# Patient Record
Sex: FEMALE | Race: WHITE | URBAN - METROPOLITAN AREA
[De-identification: names, ages, dates, MRNs, and addresses within clinical notes are randomized per-mention and may not be internally consistent; named-entity substitution may affect disease eponyms.]

---

## 2017-08-18 ENCOUNTER — HISTORICAL (OUTPATIENT)
Dept: LAB | Facility: HOSPITAL | Age: 62
End: 2017-08-18

## 2017-08-18 LAB
ALBUMIN SERPL-MCNC: 3.4 GM/DL (ref 3.4–5)
ALBUMIN/GLOB SERPL: 1 RATIO (ref 1.1–2)
ALP SERPL-CCNC: 107 UNIT/L (ref 46–116)
ALT SERPL-CCNC: 35 UNIT/L (ref 12–78)
AST SERPL-CCNC: 42 UNIT/L (ref 15–37)
BILIRUB SERPL-MCNC: 0.3 MG/DL (ref 0.2–1)
BILIRUBIN DIRECT+TOT PNL SERPL-MCNC: 0.14 MG/DL (ref 0–0.2)
BILIRUBIN DIRECT+TOT PNL SERPL-MCNC: 0.16 MG/DL (ref 0–0.8)
BUN SERPL-MCNC: 15.8 MG/DL (ref 7–18)
CALCIUM SERPL-MCNC: 8.8 MG/DL (ref 8.5–10.1)
CHLORIDE SERPL-SCNC: 102 MMOL/L (ref 98–107)
CHOLEST SERPL-MCNC: 169 MG/DL (ref 0–200)
CHOLEST/HDLC SERPL: 1.5 {RATIO} (ref 0–4)
CO2 SERPL-SCNC: 25.9 MMOL/L (ref 21–32)
CREAT SERPL-MCNC: 0.73 MG/DL (ref 0.6–1.3)
DEPRECATED CALCIDIOL+CALCIFEROL SERPL-MC: 66.8 NG/ML (ref 30–80)
ERYTHROCYTE [DISTWIDTH] IN BLOOD BY AUTOMATED COUNT: 13 % (ref 11.5–17)
FOLATE SERPL-MCNC: 5.3 NG/ML (ref 8.6–58.9)
GLOBULIN SER-MCNC: 3.4 GM/DL (ref 2.4–3.5)
GLUCOSE SERPL-MCNC: 70 MG/DL (ref 74–106)
HCT VFR BLD AUTO: 39.2 % (ref 37–47)
HDLC SERPL-MCNC: 116 MG/DL (ref 40–60)
HGB BLD-MCNC: 13 GM/DL (ref 12–16)
LDLC SERPL CALC-MCNC: 36 MG/DL (ref 0–129)
MAGNESIUM SERPL-MCNC: 1.7 MG/DL (ref 1.8–2.4)
MCH RBC QN AUTO: 32.8 PG (ref 27–31)
MCHC RBC AUTO-ENTMCNC: 33.1 GM/DL (ref 33–36)
MCV RBC AUTO: 98.8 FL (ref 80–94)
PLATELET # BLD AUTO: 265 X10(3)/MCL (ref 130–400)
PMV BLD AUTO: 7 FL (ref 7.4–10.4)
POTASSIUM SERPL-SCNC: 4 MMOL/L (ref 3.5–5.1)
PREALB SERPL-MCNC: 22.9 MG/DL (ref 18–35.7)
PROT SERPL-MCNC: 6.8 GM/DL (ref 6.4–8.2)
RBC # BLD AUTO: 3.96 X10(6)/MCL (ref 4.2–5.4)
SODIUM SERPL-SCNC: 140 MMOL/L (ref 136–145)
TRIGL SERPL-MCNC: 83 MG/DL
VIT B12 SERPL-MCNC: 548 PG/ML (ref 193–986)
VLDLC SERPL CALC-MCNC: 17 MG/DL
WBC # SPEC AUTO: 6.3 X10(3)/MCL (ref 4.5–11.5)

## 2017-10-10 ENCOUNTER — HISTORICAL (OUTPATIENT)
Dept: ADMINISTRATIVE | Facility: HOSPITAL | Age: 62
End: 2017-10-10

## 2017-10-10 LAB
ABS NEUT (OLG): 3.32 X10(3)/MCL (ref 2.1–9.2)
ALBUMIN SERPL-MCNC: 3.4 GM/DL (ref 3.4–5)
ALBUMIN/GLOB SERPL: 1 RATIO (ref 1.1–2)
ALP SERPL-CCNC: 138 UNIT/L (ref 38–126)
ALT SERPL-CCNC: 42 UNIT/L (ref 12–78)
APTT PPP: 31.4 SECOND(S) (ref 24.8–36.9)
AST SERPL-CCNC: 37 UNIT/L (ref 15–37)
BASOPHILS # BLD AUTO: 0 X10(3)/MCL (ref 0–0.2)
BASOPHILS NFR BLD AUTO: 1 %
BILIRUB SERPL-MCNC: 0.3 MG/DL (ref 0.2–1)
BILIRUBIN DIRECT+TOT PNL SERPL-MCNC: 0.1 MG/DL (ref 0–0.5)
BILIRUBIN DIRECT+TOT PNL SERPL-MCNC: 0.2 MG/DL (ref 0–0.8)
BUN SERPL-MCNC: 15 MG/DL (ref 7–18)
CALCIUM SERPL-MCNC: 9.1 MG/DL (ref 8.5–10.1)
CHLORIDE SERPL-SCNC: 106 MMOL/L (ref 98–107)
CO2 SERPL-SCNC: 30 MMOL/L (ref 21–32)
CREAT SERPL-MCNC: 0.63 MG/DL (ref 0.55–1.02)
EOSINOPHIL # BLD AUTO: 0.2 X10(3)/MCL (ref 0–0.9)
EOSINOPHIL NFR BLD AUTO: 3 %
ERYTHROCYTE [DISTWIDTH] IN BLOOD BY AUTOMATED COUNT: 12.2 % (ref 11.5–17)
GLOBULIN SER-MCNC: 3.4 GM/DL (ref 2.4–3.5)
GLUCOSE SERPL-MCNC: 91 MG/DL (ref 74–106)
HCT VFR BLD AUTO: 39.9 % (ref 37–47)
HGB BLD-MCNC: 13 GM/DL (ref 12–16)
INR PPP: 1.03 (ref 0–1.27)
LYMPHOCYTES # BLD AUTO: 1.6 X10(3)/MCL (ref 0.6–4.6)
LYMPHOCYTES NFR BLD AUTO: 29 %
MCH RBC QN AUTO: 32.3 PG (ref 27–31)
MCHC RBC AUTO-ENTMCNC: 32.6 GM/DL (ref 33–36)
MCV RBC AUTO: 99.3 FL (ref 80–94)
MONOCYTES # BLD AUTO: 0.6 X10(3)/MCL (ref 0.1–1.3)
MONOCYTES NFR BLD AUTO: 10 %
NEUTROPHILS # BLD AUTO: 3.32 X10(3)/MCL (ref 2.1–9.2)
NEUTROPHILS NFR BLD AUTO: 58 %
PLATELET # BLD AUTO: 288 X10(3)/MCL (ref 130–400)
PMV BLD AUTO: 8.7 FL (ref 9.4–12.4)
POTASSIUM SERPL-SCNC: 4.5 MMOL/L (ref 3.5–5.1)
PROT SERPL-MCNC: 6.8 GM/DL (ref 6.4–8.2)
PROTHROMBIN TIME: 13.3 SECOND(S) (ref 12.2–14.7)
RBC # BLD AUTO: 4.02 X10(6)/MCL (ref 4.2–5.4)
SODIUM SERPL-SCNC: 145 MMOL/L (ref 136–145)
WBC # SPEC AUTO: 5.7 X10(3)/MCL (ref 4.5–11.5)

## 2017-10-19 ENCOUNTER — HISTORICAL (OUTPATIENT)
Dept: ADMINISTRATIVE | Facility: HOSPITAL | Age: 62
End: 2017-10-19

## 2017-10-26 ENCOUNTER — HISTORICAL (OUTPATIENT)
Dept: RADIOLOGY | Facility: HOSPITAL | Age: 62
End: 2017-10-26

## 2017-11-20 LAB — FINAL CULTURE: NORMAL

## 2019-11-01 ENCOUNTER — HISTORICAL (OUTPATIENT)
Dept: LAB | Facility: HOSPITAL | Age: 64
End: 2019-11-01

## 2019-11-01 LAB
ALBUMIN SERPL-MCNC: 3.7 GM/DL (ref 3.4–5)
ALBUMIN/GLOB SERPL: 1.1 RATIO (ref 1.1–2)
ALP SERPL-CCNC: 109 UNIT/L (ref 46–116)
ALT SERPL-CCNC: 43 UNIT/L (ref 12–78)
AST SERPL-CCNC: 49 UNIT/L (ref 15–37)
BILIRUB SERPL-MCNC: 0.4 MG/DL (ref 0.2–1)
BILIRUBIN DIRECT+TOT PNL SERPL-MCNC: 0.15 MG/DL (ref 0–0.2)
BILIRUBIN DIRECT+TOT PNL SERPL-MCNC: 0.25 MG/DL (ref 0–0.8)
BUN SERPL-MCNC: 20.9 MG/DL (ref 7–18)
CALCIUM SERPL-MCNC: 9.3 MG/DL (ref 8.5–10.1)
CHLORIDE SERPL-SCNC: 106 MMOL/L (ref 98–107)
CHOLEST SERPL-MCNC: 212 MG/DL (ref 0–200)
CHOLEST/HDLC SERPL: 2.6 {RATIO} (ref 0–4)
CO2 SERPL-SCNC: 26.8 MMOL/L (ref 21–32)
CREAT SERPL-MCNC: 1.11 MG/DL (ref 0.6–1.3)
GLOBULIN SER-MCNC: 3.4 GM/DL (ref 2.4–3.5)
GLUCOSE SERPL-MCNC: 95 MG/DL (ref 74–106)
HDLC SERPL-MCNC: 80 MG/DL (ref 40–60)
LDLC SERPL CALC-MCNC: 86 MG/DL (ref 0–129)
POTASSIUM SERPL-SCNC: 4.8 MMOL/L (ref 3.5–5.1)
PROT SERPL-MCNC: 7.1 GM/DL (ref 6.4–8.2)
SODIUM SERPL-SCNC: 143 MMOL/L (ref 136–145)
TRIGL SERPL-MCNC: 231 MG/DL
VLDLC SERPL CALC-MCNC: 46 MG/DL

## 2019-12-23 ENCOUNTER — HISTORICAL (OUTPATIENT)
Dept: LAB | Facility: HOSPITAL | Age: 64
End: 2019-12-23

## 2019-12-23 LAB
ABS NEUT (OLG): 4.72 X10(3)/MCL (ref 2.1–9.2)
ALBUMIN SERPL-MCNC: 3.6 GM/DL (ref 3.4–5)
ALBUMIN/GLOB SERPL: 1 RATIO (ref 1.1–2)
ALP SERPL-CCNC: 122 UNIT/L (ref 46–116)
ALT SERPL-CCNC: 38 UNIT/L (ref 12–78)
AST SERPL-CCNC: 32 UNIT/L (ref 15–37)
BASOPHILS # BLD AUTO: 0 X10(3)/MCL (ref 0–0.2)
BASOPHILS NFR BLD AUTO: 1 %
BILIRUB SERPL-MCNC: 0.3 MG/DL (ref 0.2–1)
BILIRUBIN DIRECT+TOT PNL SERPL-MCNC: 0.12 MG/DL (ref 0–0.2)
BILIRUBIN DIRECT+TOT PNL SERPL-MCNC: 0.18 MG/DL (ref 0–0.8)
BUN SERPL-MCNC: 22.5 MG/DL (ref 7–18)
CALCIUM SERPL-MCNC: 9.1 MG/DL (ref 8.5–10.1)
CHLORIDE SERPL-SCNC: 106 MMOL/L (ref 98–107)
CO2 SERPL-SCNC: 24.3 MMOL/L (ref 21–32)
CREAT SERPL-MCNC: 0.9 MG/DL (ref 0.6–1.3)
EOSINOPHIL # BLD AUTO: 0.2 X10(3)/MCL (ref 0–0.9)
EOSINOPHIL NFR BLD AUTO: 3 %
ERYTHROCYTE [DISTWIDTH] IN BLOOD BY AUTOMATED COUNT: 11.7 % (ref 11.5–17)
GLOBULIN SER-MCNC: 3.7 GM/DL (ref 2.4–3.5)
GLUCOSE SERPL-MCNC: 95 MG/DL (ref 74–106)
HCT VFR BLD AUTO: 36.3 % (ref 37–47)
HGB BLD-MCNC: 11.6 GM/DL (ref 12–16)
IMM GRANULOCYTES # BLD AUTO: 0.02 % (ref 0–0.02)
IMM GRANULOCYTES NFR BLD AUTO: 0.3 % (ref 0–0.43)
LYMPHOCYTES # BLD AUTO: 1.1 X10(3)/MCL (ref 0.6–4.6)
LYMPHOCYTES NFR BLD AUTO: 16 %
MAGNESIUM SERPL-MCNC: 1.7 MG/DL (ref 1.8–2.4)
MCH RBC QN AUTO: 31.4 PG (ref 27–31)
MCHC RBC AUTO-ENTMCNC: 32 GM/DL (ref 33–36)
MCV RBC AUTO: 98.1 FL (ref 80–94)
MONOCYTES # BLD AUTO: 0.6 X10(3)/MCL (ref 0.1–1.3)
MONOCYTES NFR BLD AUTO: 10 %
NEUTROPHILS # BLD AUTO: 4.72 X10(3)/MCL (ref 1.4–7.9)
NEUTROPHILS NFR BLD AUTO: 71 %
PHOSPHATE SERPL-MCNC: 4.6 MG/DL (ref 2.5–4.9)
PLATELET # BLD AUTO: 337 X10(3)/MCL (ref 130–400)
PMV BLD AUTO: 9.7 FL (ref 9.4–12.4)
POTASSIUM SERPL-SCNC: 4 MMOL/L (ref 3.5–5.1)
PROT SERPL-MCNC: 7.3 GM/DL (ref 6.4–8.2)
RBC # BLD AUTO: 3.7 X10(6)/MCL (ref 4.2–5.4)
SODIUM SERPL-SCNC: 140 MMOL/L (ref 136–145)
WBC # SPEC AUTO: 6.7 X10(3)/MCL (ref 4.5–11.5)

## 2020-03-24 ENCOUNTER — HISTORICAL (OUTPATIENT)
Dept: ADMINISTRATIVE | Facility: HOSPITAL | Age: 65
End: 2020-03-24

## 2020-03-24 LAB
ALBUMIN SERPL-MCNC: 4.1 G/DL (ref 3.8–4.8)
ALBUMIN/GLOB SERPL: 1.6 {RATIO} (ref 1.2–2.2)
ALP SERPL-CCNC: 102 IU/L (ref 39–117)
ALT SERPL-CCNC: 70 IU/L (ref 0–32)
AST SERPL-CCNC: 106 IU/L (ref 0–40)
BASOPHILS # BLD AUTO: 0.1 X10E3/UL (ref 0–0.2)
BASOPHILS NFR BLD AUTO: 1 %
BILIRUB SERPL-MCNC: 0.3 MG/DL (ref 0–1.2)
BUN SERPL-MCNC: 17 MG/DL (ref 8–27)
CALCIUM SERPL-MCNC: 10 MG/DL (ref 8.7–10.3)
CHLORIDE SERPL-SCNC: 102 MMOL/L (ref 96–106)
CHOLEST SERPL-MCNC: 174 MG/DL (ref 100–199)
CHOLEST/HDLC SERPL: 3.3 RATIO (ref 0–4.4)
CO2 SERPL-SCNC: 23 MMOL/L (ref 20–29)
CREAT SERPL-MCNC: 0.91 MG/DL (ref 0.57–1)
CREAT/UREA NIT SERPL: 19 (ref 12–28)
DEPRECATED CALCIDIOL+CALCIFEROL SERPL-MC: 60 NG/ML (ref 30–100)
EOSINOPHIL # BLD AUTO: 0.1 X10E3/UL (ref 0–0.4)
EOSINOPHIL NFR BLD AUTO: 2 %
ERYTHROCYTE [DISTWIDTH] IN BLOOD BY AUTOMATED COUNT: 13.3 % (ref 11.7–15.4)
GLOBULIN SER-MCNC: 2.6 G/DL (ref 1.5–4.5)
GLUCOSE SERPL-MCNC: 121 MG/DL (ref 65–99)
HCT VFR BLD AUTO: 38.7 % (ref 34–46.6)
HDLC SERPL-MCNC: 52 MG/DL
HGB BLD-MCNC: 11.8 G/DL (ref 11.1–15.9)
LDLC SERPL CALC-MCNC: 82 MG/DL (ref 0–99)
LYMPHOCYTES # BLD AUTO: 1 X10E3/UL (ref 0.7–3.1)
LYMPHOCYTES NFR BLD AUTO: 17 %
MCH RBC QN AUTO: 30.3 PG (ref 26.6–33)
MCHC RBC AUTO-ENTMCNC: 30.5 G/DL (ref 31.5–35.7)
MCV RBC AUTO: 100 FL (ref 79–97)
MICROALBUMIN/CREAT RATIO PNL UR: 38 MG/G CREAT (ref 0–29)
MONOCYTES # BLD AUTO: 0.5 X10E3/UL (ref 0.1–0.9)
MONOCYTES NFR BLD AUTO: 8 %
NEUTROPHILS # BLD AUTO: 4.2 X10E3/UL (ref 1.4–7)
NEUTROPHILS NFR BLD AUTO: 72 %
PLATELET # BLD AUTO: 417 X10E3/UL (ref 150–450)
POTASSIUM SERPL-SCNC: 4.8 MMOL/L (ref 3.5–5.2)
PROT SERPL-MCNC: 6.7 G/DL (ref 6–8.5)
RBC # BLD AUTO: 3.89 X10(6)/MCL (ref 3.77–5.28)
SODIUM SERPL-SCNC: 142 MMOL/L (ref 134–144)
TRIGL SERPL-MCNC: 200 MG/DL (ref 0–149)
TSH SERPL-ACNC: 1.24 MIU/ML (ref 0.45–4.5)
VLDLC SERPL CALC-MCNC: 40 MG/DL (ref 5–40)
WBC # SPEC AUTO: 5.8 X10E3/UL (ref 3.4–10.8)

## 2020-04-06 ENCOUNTER — HISTORICAL (OUTPATIENT)
Dept: ADMINISTRATIVE | Facility: HOSPITAL | Age: 65
End: 2020-04-06

## 2020-04-06 LAB
ABS NEUT (OLG): 3.86 X10(3)/MCL (ref 2.1–9.2)
ALBUMIN SERPL-MCNC: 3.6 GM/DL (ref 3.4–4.8)
ALBUMIN/GLOB SERPL: 1.3 RATIO (ref 1.1–2)
ALP SERPL-CCNC: 94 UNIT/L (ref 40–150)
ALT SERPL-CCNC: 53 UNIT/L (ref 0–55)
AST SERPL-CCNC: 66 UNIT/L (ref 5–34)
BASOPHILS # BLD AUTO: 0 X10(3)/MCL (ref 0–0.2)
BASOPHILS NFR BLD AUTO: 0.8 %
BILIRUB SERPL-MCNC: 0.3 MG/DL
BILIRUBIN DIRECT+TOT PNL SERPL-MCNC: 0.1 MG/DL (ref 0–0.5)
BILIRUBIN DIRECT+TOT PNL SERPL-MCNC: 0.2 MG/DL (ref 0–0.8)
BUN SERPL-MCNC: 13 MG/DL (ref 9.8–20.1)
CALCIUM SERPL-MCNC: 8.8 MG/DL (ref 8.4–10.2)
CHLORIDE SERPL-SCNC: 106 MMOL/L (ref 98–107)
CO2 SERPL-SCNC: 27 MMOL/L (ref 23–31)
CREAT SERPL-MCNC: 0.74 MG/DL (ref 0.55–1.02)
EOSINOPHIL # BLD AUTO: 0.4 X10(3)/MCL (ref 0–0.9)
EOSINOPHIL NFR BLD AUTO: 6.8 %
ERYTHROCYTE [DISTWIDTH] IN BLOOD BY AUTOMATED COUNT: 13.3 % (ref 11.5–17)
GLOBULIN SER-MCNC: 2.7 GM/DL (ref 2.4–3.5)
GLUCOSE SERPL-MCNC: 38 MG/DL (ref 82–115)
HCT VFR BLD AUTO: 34 % (ref 37–47)
HGB BLD-MCNC: 10.3 GM/DL (ref 12–16)
LYMPHOCYTES # BLD AUTO: 1.2 X10(3)/MCL (ref 0.6–4.6)
LYMPHOCYTES NFR BLD AUTO: 18.9 %
MCH RBC QN AUTO: 30.1 PG (ref 27–31)
MCHC RBC AUTO-ENTMCNC: 30.3 GM/DL (ref 33–36)
MCV RBC AUTO: 99.4 FL (ref 80–94)
MONOCYTES # BLD AUTO: 0.6 X10(3)/MCL (ref 0.1–1.3)
MONOCYTES NFR BLD AUTO: 10.4 %
NEUTROPHILS # BLD AUTO: 3.9 X10(3)/MCL (ref 2.1–9.2)
NEUTROPHILS NFR BLD AUTO: 62.9 %
PLATELET # BLD AUTO: 281 X10(3)/MCL (ref 130–400)
PMV BLD AUTO: 9.1 FL (ref 9.4–12.4)
POTASSIUM SERPL-SCNC: 4.4 MMOL/L (ref 3.5–5.1)
PROT SERPL-MCNC: 6.3 GM/DL (ref 5.8–7.6)
RBC # BLD AUTO: 3.42 X10(6)/MCL (ref 4.2–5.4)
SODIUM SERPL-SCNC: 142 MMOL/L (ref 136–145)
TSH SERPL-ACNC: 1.59 UIU/ML (ref 0.35–4.94)
WBC # SPEC AUTO: 6.1 X10(3)/MCL (ref 4.5–11.5)

## 2020-04-22 ENCOUNTER — HISTORICAL (OUTPATIENT)
Dept: INFUSION THERAPY | Facility: HOSPITAL | Age: 65
End: 2020-04-22

## 2020-04-22 LAB
ABS NEUT (OLG): 3.72 X10(3)/MCL (ref 2.1–9.2)
ANION GAP SERPL CALC-SCNC: 14 MMOL/L
BASOPHILS # BLD AUTO: 0 X10(3)/MCL (ref 0–0.2)
BASOPHILS NFR BLD AUTO: 0.7 %
BUN SERPL-MCNC: 10 MG/DL (ref 8–26)
CHLORIDE SERPL-SCNC: 102 MMOL/L (ref 98–109)
CREAT SERPL-MCNC: 0.7 MG/DL (ref 0.6–1.3)
EOSINOPHIL # BLD AUTO: 0.3 X10(3)/MCL (ref 0–0.9)
EOSINOPHIL NFR BLD AUTO: 4.9 %
ERYTHROCYTE [DISTWIDTH] IN BLOOD BY AUTOMATED COUNT: 12.3 % (ref 11.5–17)
FERRITIN SERPL-MCNC: 173.65 NG/ML (ref 4.63–204)
FOLATE SERPL-MCNC: 17.6 NG/ML (ref 7–31.4)
GLUCOSE SERPL-MCNC: 277 MG/DL (ref 70–105)
HCT VFR BLD AUTO: 36.6 % (ref 37–47)
HCT VFR BLD CALC: 37 % (ref 38–51)
HGB BLD-MCNC: 11.3 GM/DL (ref 12–16)
HGB BLD-MCNC: 12.6 MG/DL (ref 12–17)
IRON SATN MFR SERPL: 27 %
IRON SERPL-MCNC: 69 UG/DL (ref 50–170)
LYMPHOCYTES # BLD AUTO: 0.9 X10(3)/MCL (ref 0.6–4.6)
LYMPHOCYTES NFR BLD AUTO: 16.8 %
MCH RBC QN AUTO: 30.1 PG (ref 27–31)
MCHC RBC AUTO-ENTMCNC: 30.9 GM/DL (ref 33–36)
MCV RBC AUTO: 97.3 FL (ref 80–94)
MONOCYTES # BLD AUTO: 0.4 X10(3)/MCL (ref 0.1–1.3)
MONOCYTES NFR BLD AUTO: 8 %
NEUTROPHILS # BLD AUTO: 3.7 X10(3)/MCL (ref 2.1–9.2)
NEUTROPHILS NFR BLD AUTO: 69.4 %
PLATELET # BLD AUTO: 309 X10(3)/MCL (ref 130–400)
PMV BLD AUTO: 9.6 FL (ref 9.4–12.4)
POC IONIZED CALCIUM: 1.11 MMOL/L (ref 1.12–1.32)
POC TCO2: 26 MMOL/L (ref 24–29)
POTASSIUM BLD-SCNC: 4.3 MMOL/L (ref 3.5–4.9)
RBC # BLD AUTO: 3.76 X10(6)/MCL (ref 4.2–5.4)
RET# (OHS): 0.05 X10^6/ML (ref 0.02–0.08)
RETICULOCYTE COUNT AUTOMATED (OLG): 1.3 % (ref 1.1–2.1)
SODIUM BLD-SCNC: 137 MMOL/L (ref 138–146)
TIBC SERPL-MCNC: 183 UG/DL (ref 70–310)
TIBC SERPL-MCNC: 252 UG/DL
TRANSFERRIN SERPL-MCNC: 212 MG/DL (ref 173–360)
VIT B12 SERPL-MCNC: 602 PG/ML (ref 213–816)
WBC # SPEC AUTO: 5.4 X10(3)/MCL (ref 4.5–11.5)

## 2020-04-29 ENCOUNTER — HISTORICAL (OUTPATIENT)
Dept: HEMATOLOGY/ONCOLOGY | Facility: CLINIC | Age: 65
End: 2020-04-29

## 2020-04-29 LAB
ABS NEUT (OLG): 1.99 X10(3)/MCL (ref 2.1–9.2)
ALBUMIN SERPL-MCNC: 3.9 GM/DL (ref 3.4–4.8)
ALBUMIN/GLOB SERPL: 1.4 RATIO (ref 1.1–2)
ALP SERPL-CCNC: 109 UNIT/L (ref 40–150)
ALT SERPL-CCNC: 26 UNIT/L (ref 0–55)
AST SERPL-CCNC: 27 UNIT/L (ref 5–34)
BASOPHILS # BLD AUTO: 0 X10(3)/MCL (ref 0–0.2)
BASOPHILS NFR BLD AUTO: 0.9 %
BILIRUB SERPL-MCNC: 0.4 MG/DL
BILIRUBIN DIRECT+TOT PNL SERPL-MCNC: 0.2 MG/DL (ref 0–0.5)
BILIRUBIN DIRECT+TOT PNL SERPL-MCNC: 0.2 MG/DL (ref 0–0.8)
BUN SERPL-MCNC: 12 MG/DL (ref 9.8–20.1)
CALCIUM SERPL-MCNC: 9.2 MG/DL (ref 8.4–10.2)
CHLORIDE SERPL-SCNC: 102 MMOL/L (ref 98–107)
CO2 SERPL-SCNC: 27 MMOL/L (ref 23–31)
CREAT SERPL-MCNC: 0.8 MG/DL (ref 0.55–1.02)
EOSINOPHIL # BLD AUTO: 0.2 X10(3)/MCL (ref 0–0.9)
EOSINOPHIL NFR BLD AUTO: 7.1 %
ERYTHROCYTE [DISTWIDTH] IN BLOOD BY AUTOMATED COUNT: 12.1 % (ref 11.5–17)
GLOBULIN SER-MCNC: 2.7 GM/DL (ref 2.4–3.5)
GLUCOSE SERPL-MCNC: 232 MG/DL (ref 82–115)
HCT VFR BLD AUTO: 36.5 % (ref 37–47)
HGB BLD-MCNC: 11.3 GM/DL (ref 12–16)
LYMPHOCYTES # BLD AUTO: 0.9 X10(3)/MCL (ref 0.6–4.6)
LYMPHOCYTES NFR BLD AUTO: 27.6 %
MCH RBC QN AUTO: 29.3 PG (ref 27–31)
MCHC RBC AUTO-ENTMCNC: 31 GM/DL (ref 33–36)
MCV RBC AUTO: 94.6 FL (ref 80–94)
MONOCYTES # BLD AUTO: 0.2 X10(3)/MCL (ref 0.1–1.3)
MONOCYTES NFR BLD AUTO: 5.3 %
NEUTROPHILS # BLD AUTO: 2 X10(3)/MCL (ref 2.1–9.2)
NEUTROPHILS NFR BLD AUTO: 59.1 %
PLATELET # BLD AUTO: 342 X10(3)/MCL (ref 130–400)
PMV BLD AUTO: 9.6 FL (ref 9.4–12.4)
POTASSIUM SERPL-SCNC: 4.2 MMOL/L (ref 3.5–5.1)
PROT SERPL-MCNC: 6.6 GM/DL (ref 5.8–7.6)
RBC # BLD AUTO: 3.86 X10(6)/MCL (ref 4.2–5.4)
SODIUM SERPL-SCNC: 138 MMOL/L (ref 136–145)
WBC # SPEC AUTO: 3.4 X10(3)/MCL (ref 4.5–11.5)

## 2020-05-13 ENCOUNTER — HISTORICAL (OUTPATIENT)
Dept: INFUSION THERAPY | Facility: HOSPITAL | Age: 65
End: 2020-05-13

## 2020-05-13 LAB
ABS NEUT (OLG): 4.14 X10(3)/MCL (ref 2.1–9.2)
ANION GAP SERPL CALC-SCNC: 16 MMOL/L
BASOPHILS # BLD AUTO: 0.1 X10(3)/MCL (ref 0–0.2)
BASOPHILS NFR BLD AUTO: 1.2 %
BUN SERPL-MCNC: 10 MG/DL (ref 8–26)
CHLORIDE SERPL-SCNC: 102 MMOL/L (ref 98–109)
CREAT SERPL-MCNC: 0.8 MG/DL (ref 0.6–1.3)
EOSINOPHIL # BLD AUTO: 0.3 X10(3)/MCL (ref 0–0.9)
EOSINOPHIL NFR BLD AUTO: 4.8 %
ERYTHROCYTE [DISTWIDTH] IN BLOOD BY AUTOMATED COUNT: 12.7 % (ref 11.5–17)
GLUCOSE SERPL-MCNC: 348 MG/DL (ref 70–105)
HCT VFR BLD AUTO: 36.8 % (ref 37–47)
HCT VFR BLD CALC: 38 % (ref 38–51)
HGB BLD-MCNC: 11.4 GM/DL (ref 12–16)
HGB BLD-MCNC: 12.9 MG/DL (ref 12–17)
LYMPHOCYTES # BLD AUTO: 1.3 X10(3)/MCL (ref 0.6–4.6)
LYMPHOCYTES NFR BLD AUTO: 19.8 %
MCH RBC QN AUTO: 29.6 PG (ref 27–31)
MCHC RBC AUTO-ENTMCNC: 31 GM/DL (ref 33–36)
MCV RBC AUTO: 95.6 FL (ref 80–94)
MONOCYTES # BLD AUTO: 0.6 X10(3)/MCL (ref 0.1–1.3)
MONOCYTES NFR BLD AUTO: 9.2 %
NEUTROPHILS # BLD AUTO: 4.1 X10(3)/MCL (ref 2.1–9.2)
NEUTROPHILS NFR BLD AUTO: 64.5 %
PLATELET # BLD AUTO: 344 X10(3)/MCL (ref 130–400)
PMV BLD AUTO: 9 FL (ref 9.4–12.4)
POC IONIZED CALCIUM: 1.15 MMOL/L (ref 1.12–1.32)
POC TCO2: 25 MMOL/L (ref 24–29)
POTASSIUM BLD-SCNC: 4.5 MMOL/L (ref 3.5–4.9)
RBC # BLD AUTO: 3.85 X10(6)/MCL (ref 4.2–5.4)
SODIUM BLD-SCNC: 137 MMOL/L (ref 138–146)
WBC # SPEC AUTO: 6.4 X10(3)/MCL (ref 4.5–11.5)

## 2020-05-17 ENCOUNTER — HOSPITAL ENCOUNTER (OUTPATIENT)
Dept: MEDSURG UNIT | Facility: HOSPITAL | Age: 65
End: 2020-05-19
Attending: INTERNAL MEDICINE | Admitting: INTERNAL MEDICINE

## 2020-05-18 LAB
ABS NEUT (OLG): 4.18 X10(3)/MCL (ref 2.1–9.2)
ALBUMIN SERPL-MCNC: 3 GM/DL (ref 3.4–5)
ALBUMIN/GLOB SERPL: 1.1 RATIO (ref 1.1–2)
ALP SERPL-CCNC: 88 UNIT/L (ref 46–116)
ALT SERPL-CCNC: 29 UNIT/L (ref 12–78)
AST SERPL-CCNC: 20 UNIT/L (ref 15–37)
BASOPHILS # BLD AUTO: 0 X10(3)/MCL (ref 0–0.2)
BASOPHILS NFR BLD AUTO: 1 %
BILIRUB SERPL-MCNC: 0.6 MG/DL (ref 0.2–1)
BILIRUBIN DIRECT+TOT PNL SERPL-MCNC: 0.2 MG/DL (ref 0–0.2)
BILIRUBIN DIRECT+TOT PNL SERPL-MCNC: 0.4 MG/DL (ref 0–0.8)
BUN SERPL-MCNC: 13.7 MG/DL (ref 7–18)
CALCIUM SERPL-MCNC: 7.9 MG/DL (ref 8.5–10.1)
CHLORIDE SERPL-SCNC: 104 MMOL/L (ref 98–107)
CO2 SERPL-SCNC: 20.8 MMOL/L (ref 21–32)
CREAT SERPL-MCNC: 1.14 MG/DL (ref 0.6–1.3)
EOSINOPHIL # BLD AUTO: 1 X10(3)/MCL (ref 0–0.9)
EOSINOPHIL NFR BLD AUTO: 16 %
ERYTHROCYTE [DISTWIDTH] IN BLOOD BY AUTOMATED COUNT: 12.8 % (ref 11.5–17)
GLOBULIN SER-MCNC: 2.8 GM/DL (ref 2.4–3.5)
GLUCOSE SERPL-MCNC: 358 MG/DL (ref 74–106)
HCT VFR BLD AUTO: 31.9 % (ref 37–47)
HGB BLD-MCNC: 10.3 GM/DL (ref 12–16)
IMM GRANULOCYTES # BLD AUTO: 0.01 % (ref 0–0.02)
IMM GRANULOCYTES NFR BLD AUTO: 0.2 % (ref 0–0.43)
LYMPHOCYTES # BLD AUTO: 1 X10(3)/MCL (ref 0.6–4.6)
LYMPHOCYTES NFR BLD AUTO: 15 %
MCH RBC QN AUTO: 30.2 PG (ref 27–31)
MCHC RBC AUTO-ENTMCNC: 32.3 GM/DL (ref 33–36)
MCV RBC AUTO: 93.5 FL (ref 80–94)
MONOCYTES # BLD AUTO: 0.1 X10(3)/MCL (ref 0.1–1.3)
MONOCYTES NFR BLD AUTO: 2 %
NEUTROPHILS # BLD AUTO: 4.18 X10(3)/MCL (ref 1.4–7.9)
NEUTROPHILS NFR BLD AUTO: 66 %
PLATELET # BLD AUTO: 221 X10(3)/MCL (ref 130–400)
PMV BLD AUTO: 9.9 FL (ref 9.4–12.4)
POTASSIUM SERPL-SCNC: 3.7 MMOL/L (ref 3.5–5.1)
PROT SERPL-MCNC: 5.8 GM/DL (ref 6.4–8.2)
RBC # BLD AUTO: 3.41 X10(6)/MCL (ref 4.2–5.4)
SODIUM SERPL-SCNC: 140 MMOL/L (ref 136–145)
WBC # SPEC AUTO: 6.3 X10(3)/MCL (ref 4.5–11.5)

## 2020-05-20 ENCOUNTER — HISTORICAL (OUTPATIENT)
Dept: ADMINISTRATIVE | Facility: HOSPITAL | Age: 65
End: 2020-05-20

## 2020-05-20 LAB
ALBUMIN SERPL-MCNC: 3.9 G/DL (ref 3.8–4.8)
ALBUMIN/GLOB SERPL: 2.2 {RATIO} (ref 1.2–2.2)
ALP SERPL-CCNC: 99 IU/L (ref 39–117)
ALT SERPL-CCNC: 25 IU/L (ref 0–32)
AST SERPL-CCNC: 31 IU/L (ref 0–40)
BASOPHILS # BLD AUTO: 0.1 X10E3/UL (ref 0–0.2)
BASOPHILS NFR BLD AUTO: 1 %
BILIRUB SERPL-MCNC: 0.2 MG/DL (ref 0–1.2)
BUN SERPL-MCNC: 13 MG/DL (ref 8–27)
CALCIUM SERPL-MCNC: 9.3 MG/DL (ref 8.7–10.3)
CHLORIDE SERPL-SCNC: 96 MMOL/L (ref 96–106)
CO2 SERPL-SCNC: 26 MMOL/L (ref 20–29)
CREAT SERPL-MCNC: 0.77 MG/DL (ref 0.57–1)
CREAT/UREA NIT SERPL: 17 (ref 12–28)
EOSINOPHIL # BLD AUTO: 1 X10E3/UL (ref 0–0.4)
EOSINOPHIL NFR BLD AUTO: 21 %
ERYTHROCYTE [DISTWIDTH] IN BLOOD BY AUTOMATED COUNT: 12.7 % (ref 11.7–15.4)
GLOBULIN SER-MCNC: 1.8 G/DL (ref 1.5–4.5)
GLUCOSE SERPL-MCNC: 371 MG/DL (ref 65–99)
HCT VFR BLD AUTO: 33.4 % (ref 34–46.6)
HGB BLD-MCNC: 10.9 G/DL (ref 11.1–15.9)
LYMPHOCYTES # BLD AUTO: 0.9 X10E3/UL (ref 0.7–3.1)
LYMPHOCYTES NFR BLD AUTO: 18 %
MCH RBC QN AUTO: 30.4 PG (ref 26.6–33)
MCHC RBC AUTO-ENTMCNC: 32.6 G/DL (ref 31.5–35.7)
MCV RBC AUTO: 93 FL (ref 79–97)
MONOCYTES # BLD AUTO: 0.2 X10E3/UL (ref 0.1–0.9)
MONOCYTES NFR BLD AUTO: 3 %
NEUTROPHILS # BLD AUTO: 2.6 X10E3/UL (ref 1.4–7)
NEUTROPHILS NFR BLD AUTO: 57 %
PLATELET # BLD AUTO: 229 X10E3/UL (ref 150–450)
POTASSIUM SERPL-SCNC: 4.3 MMOL/L (ref 3.5–5.2)
PROT SERPL-MCNC: 5.7 G/DL (ref 6–8.5)
RBC # BLD AUTO: 3.59 X10(6)/MCL (ref 3.77–5.28)
SODIUM SERPL-SCNC: 137 MMOL/L (ref 134–144)
WBC # SPEC AUTO: 4.6 X10E3/UL (ref 3.4–10.8)

## 2020-06-03 ENCOUNTER — HISTORICAL (OUTPATIENT)
Dept: INFUSION THERAPY | Facility: HOSPITAL | Age: 65
End: 2020-06-03

## 2020-06-10 ENCOUNTER — HISTORICAL (OUTPATIENT)
Dept: HEMATOLOGY/ONCOLOGY | Facility: CLINIC | Age: 65
End: 2020-06-10

## 2020-06-10 LAB
ABS NEUT (OLG): 3.6 X10(3)/MCL (ref 2.1–9.2)
ANION GAP SERPL CALC-SCNC: 17 MMOL/L
BASOPHILS # BLD AUTO: 0 X10(3)/MCL (ref 0–0.2)
BASOPHILS NFR BLD AUTO: 0.8 %
BUN SERPL-MCNC: 15 MG/DL (ref 8–26)
CHLORIDE SERPL-SCNC: 100 MMOL/L (ref 98–109)
CREAT SERPL-MCNC: 0.8 MG/DL (ref 0.6–1.3)
EOSINOPHIL # BLD AUTO: 1 X10(3)/MCL (ref 0–0.9)
EOSINOPHIL NFR BLD AUTO: 14.8 %
ERYTHROCYTE [DISTWIDTH] IN BLOOD BY AUTOMATED COUNT: 13 % (ref 11.5–17)
GLUCOSE SERPL-MCNC: 162 MG/DL (ref 70–105)
HCT VFR BLD AUTO: 34.1 % (ref 37–47)
HCT VFR BLD CALC: 35 % (ref 38–51)
HGB BLD-MCNC: 10.6 GM/DL (ref 12–16)
HGB BLD-MCNC: 11.9 MG/DL (ref 12–17)
LYMPHOCYTES # BLD AUTO: 1.8 X10(3)/MCL (ref 0.6–4.6)
LYMPHOCYTES NFR BLD AUTO: 27 %
MCH RBC QN AUTO: 29.8 PG (ref 27–31)
MCHC RBC AUTO-ENTMCNC: 31.1 GM/DL (ref 33–36)
MCV RBC AUTO: 95.8 FL (ref 80–94)
MONOCYTES # BLD AUTO: 0.2 X10(3)/MCL (ref 0.1–1.3)
MONOCYTES NFR BLD AUTO: 3.2 %
NEUTROPHILS # BLD AUTO: 3.6 X10(3)/MCL (ref 2.1–9.2)
NEUTROPHILS NFR BLD AUTO: 54 %
PLATELET # BLD AUTO: 327 X10(3)/MCL (ref 130–400)
PMV BLD AUTO: 8.8 FL (ref 9.4–12.4)
POC IONIZED CALCIUM: 1.22 MMOL/L (ref 1.12–1.32)
POC TCO2: 28 MMOL/L (ref 24–29)
POTASSIUM BLD-SCNC: 4.5 MMOL/L (ref 3.5–4.9)
RBC # BLD AUTO: 3.56 X10(6)/MCL (ref 4.2–5.4)
SODIUM BLD-SCNC: 139 MMOL/L (ref 138–146)
WBC # SPEC AUTO: 6.6 X10(3)/MCL (ref 4.5–11.5)

## 2020-06-24 ENCOUNTER — HISTORICAL (OUTPATIENT)
Dept: INFUSION THERAPY | Facility: HOSPITAL | Age: 65
End: 2020-06-24

## 2020-06-24 LAB
ABS NEUT (OLG): 3.41 X10(3)/MCL (ref 2.1–9.2)
ALBUMIN SERPL-MCNC: 3.8 GM/DL (ref 3.4–5)
ALP SERPL-CCNC: 87 UNIT/L (ref 40–150)
ALT SERPL-CCNC: 34 UNIT/L (ref 0–55)
ANION GAP SERPL CALC-SCNC: 18 MMOL/L
AST SERPL-CCNC: 53 UNIT/L (ref 5–34)
BASOPHILS # BLD AUTO: 0.1 X10(3)/MCL (ref 0–0.2)
BASOPHILS NFR BLD AUTO: 1.7 %
BILIRUB SERPL-MCNC: 0.4 MG/DL
BILIRUBIN DIRECT+TOT PNL SERPL-MCNC: 0.1 MG/DL (ref 0–0.5)
BILIRUBIN DIRECT+TOT PNL SERPL-MCNC: 0.3 MG/DL (ref 0–0.8)
BUN SERPL-MCNC: 15 MG/DL (ref 8–26)
CHLORIDE SERPL-SCNC: 103 MMOL/L (ref 98–109)
CREAT SERPL-MCNC: 0.8 MG/DL (ref 0.6–1.3)
EOSINOPHIL # BLD AUTO: 0.3 X10(3)/MCL (ref 0–0.9)
EOSINOPHIL NFR BLD AUTO: 4.8 %
ERYTHROCYTE [DISTWIDTH] IN BLOOD BY AUTOMATED COUNT: 13 % (ref 11.5–17)
GLUCOSE SERPL-MCNC: 187 MG/DL (ref 70–105)
HCT VFR BLD AUTO: 34.1 % (ref 37–47)
HCT VFR BLD CALC: 35 % (ref 38–51)
HGB BLD-MCNC: 10.7 GM/DL (ref 12–16)
HGB BLD-MCNC: 11.9 MG/DL (ref 12–17)
LIVER PROFILE INTERP: ABNORMAL
LYMPHOCYTES # BLD AUTO: 1.1 X10(3)/MCL (ref 0.6–4.6)
LYMPHOCYTES NFR BLD AUTO: 20.4 %
MCH RBC QN AUTO: 30.1 PG (ref 27–31)
MCHC RBC AUTO-ENTMCNC: 31.4 GM/DL (ref 33–36)
MCV RBC AUTO: 96.1 FL (ref 80–94)
MONOCYTES # BLD AUTO: 0.5 X10(3)/MCL (ref 0.1–1.3)
MONOCYTES NFR BLD AUTO: 9.9 %
NEUTROPHILS # BLD AUTO: 3.4 X10(3)/MCL (ref 2.1–9.2)
NEUTROPHILS NFR BLD AUTO: 62.8 %
PLATELET # BLD AUTO: 336 X10(3)/MCL (ref 130–400)
PMV BLD AUTO: 8.8 FL (ref 9.4–12.4)
POC IONIZED CALCIUM: 1.21 MMOL/L (ref 1.12–1.32)
POC TCO2: 25 MMOL/L (ref 24–29)
POTASSIUM BLD-SCNC: 4.5 MMOL/L (ref 3.5–4.9)
PROT SERPL-MCNC: 6.6 GM/DL (ref 5.8–7.6)
RBC # BLD AUTO: 3.55 X10(6)/MCL (ref 4.2–5.4)
SODIUM BLD-SCNC: 141 MMOL/L (ref 138–146)
WBC # SPEC AUTO: 5.4 X10(3)/MCL (ref 4.5–11.5)

## 2020-07-01 ENCOUNTER — HISTORICAL (OUTPATIENT)
Dept: LAB | Facility: HOSPITAL | Age: 65
End: 2020-07-01

## 2020-07-01 LAB
ABS NEUT (OLG): 4.79 X10(3)/MCL (ref 2.1–9.2)
ALBUMIN SERPL-MCNC: 3.7 GM/DL (ref 3.4–5)
ALBUMIN/GLOB SERPL: 1.3 RATIO (ref 1.1–2)
ALP SERPL-CCNC: 105 UNIT/L (ref 46–116)
ALT SERPL-CCNC: 47 UNIT/L (ref 12–78)
AST SERPL-CCNC: 34 UNIT/L (ref 15–37)
BASOPHILS # BLD AUTO: 0 X10(3)/MCL (ref 0–0.2)
BASOPHILS NFR BLD AUTO: 1 %
BILIRUB SERPL-MCNC: 0.4 MG/DL (ref 0.2–1)
BILIRUBIN DIRECT+TOT PNL SERPL-MCNC: 0.15 MG/DL (ref 0–0.2)
BILIRUBIN DIRECT+TOT PNL SERPL-MCNC: 0.25 MG/DL (ref 0–0.8)
BUN SERPL-MCNC: 21.3 MG/DL (ref 7–18)
CALCIUM SERPL-MCNC: 9.4 MG/DL (ref 8.5–10.1)
CHLORIDE SERPL-SCNC: 102 MMOL/L (ref 98–107)
CO2 SERPL-SCNC: 26.8 MMOL/L (ref 21–32)
CREAT SERPL-MCNC: 0.6 MG/DL (ref 0.6–1.3)
EOSINOPHIL # BLD AUTO: 0.4 X10(3)/MCL (ref 0–0.9)
EOSINOPHIL NFR BLD AUTO: 7 %
ERYTHROCYTE [DISTWIDTH] IN BLOOD BY AUTOMATED COUNT: 13 % (ref 11.5–17)
GLOBULIN SER-MCNC: 2.8 GM/DL (ref 2.4–3.5)
GLUCOSE SERPL-MCNC: 200 MG/DL (ref 74–106)
HCT VFR BLD AUTO: 31.7 % (ref 37–47)
HGB BLD-MCNC: 9.9 GM/DL (ref 12–16)
IMM GRANULOCYTES # BLD AUTO: 0.01 % (ref 0–0.02)
IMM GRANULOCYTES NFR BLD AUTO: 0.2 % (ref 0–0.43)
LYMPHOCYTES # BLD AUTO: 1.1 X10(3)/MCL (ref 0.6–4.6)
LYMPHOCYTES NFR BLD AUTO: 17 %
MAGNESIUM SERPL-MCNC: 1.6 MG/DL (ref 1.8–2.4)
MCH RBC QN AUTO: 29.7 PG (ref 27–31)
MCHC RBC AUTO-ENTMCNC: 31.2 GM/DL (ref 33–36)
MCV RBC AUTO: 95.2 FL (ref 80–94)
MONOCYTES # BLD AUTO: 0.2 X10(3)/MCL (ref 0.1–1.3)
MONOCYTES NFR BLD AUTO: 3 %
NEUTROPHILS # BLD AUTO: 4.79 X10(3)/MCL (ref 1.4–7.9)
NEUTROPHILS NFR BLD AUTO: 72 %
PLATELET # BLD AUTO: 316 X10(3)/MCL (ref 130–400)
PMV BLD AUTO: 9.8 FL (ref 9.4–12.4)
POTASSIUM SERPL-SCNC: 4.8 MMOL/L (ref 3.5–5.1)
PROT SERPL-MCNC: 6.5 GM/DL (ref 6.4–8.2)
RBC # BLD AUTO: 3.33 X10(6)/MCL (ref 4.2–5.4)
SODIUM SERPL-SCNC: 140 MMOL/L (ref 136–145)
VIT B12 SERPL-MCNC: 473 PG/ML (ref 193–986)
WBC # SPEC AUTO: 6.6 X10(3)/MCL (ref 4.5–11.5)

## 2020-07-15 ENCOUNTER — HISTORICAL (OUTPATIENT)
Dept: INFUSION THERAPY | Facility: HOSPITAL | Age: 65
End: 2020-07-15

## 2020-07-15 LAB
ABS NEUT (OLG): 3.47 X10(3)/MCL (ref 2.1–9.2)
ALBUMIN SERPL-MCNC: 3.8 GM/DL (ref 3.4–5)
ALP SERPL-CCNC: 92 UNIT/L (ref 40–150)
ALT SERPL-CCNC: 28 UNIT/L (ref 0–55)
ANION GAP SERPL CALC-SCNC: 19 MMOL/L
AST SERPL-CCNC: 30 UNIT/L (ref 5–34)
BASOPHILS # BLD AUTO: 0.1 X10(3)/MCL (ref 0–0.2)
BASOPHILS NFR BLD AUTO: 1.7 %
BILIRUB SERPL-MCNC: 0.4 MG/DL
BILIRUBIN DIRECT+TOT PNL SERPL-MCNC: 0.2 MG/DL (ref 0–0.5)
BILIRUBIN DIRECT+TOT PNL SERPL-MCNC: 0.2 MG/DL (ref 0–0.8)
BUN SERPL-MCNC: 15 MG/DL (ref 8–26)
CHLORIDE SERPL-SCNC: 100 MMOL/L (ref 98–109)
CREAT SERPL-MCNC: 0.7 MG/DL (ref 0.6–1.3)
EOSINOPHIL # BLD AUTO: 0.3 X10(3)/MCL (ref 0–0.9)
EOSINOPHIL NFR BLD AUTO: 5 %
ERYTHROCYTE [DISTWIDTH] IN BLOOD BY AUTOMATED COUNT: 13.3 % (ref 11.5–17)
GLUCOSE SERPL-MCNC: 137 MG/DL (ref 70–105)
HCT VFR BLD AUTO: 34.4 % (ref 37–47)
HCT VFR BLD CALC: 35 % (ref 38–51)
HGB BLD-MCNC: 10.7 GM/DL (ref 12–16)
HGB BLD-MCNC: 11.9 MG/DL (ref 12–17)
LIVER PROFILE INTERP: NORMAL
LYMPHOCYTES # BLD AUTO: 1.3 X10(3)/MCL (ref 0.6–4.6)
LYMPHOCYTES NFR BLD AUTO: 22.3 %
MCH RBC QN AUTO: 30.1 PG (ref 27–31)
MCHC RBC AUTO-ENTMCNC: 31.1 GM/DL (ref 33–36)
MCV RBC AUTO: 96.9 FL (ref 80–94)
MONOCYTES # BLD AUTO: 0.6 X10(3)/MCL (ref 0.1–1.3)
MONOCYTES NFR BLD AUTO: 10.7 %
NEUTROPHILS # BLD AUTO: 3.5 X10(3)/MCL (ref 2.1–9.2)
NEUTROPHILS NFR BLD AUTO: 60 %
PLATELET # BLD AUTO: 353 X10(3)/MCL (ref 130–400)
PMV BLD AUTO: 9.4 FL (ref 9.4–12.4)
POC IONIZED CALCIUM: 1.15 MMOL/L (ref 1.12–1.32)
POC TCO2: 26 MMOL/L (ref 24–29)
POTASSIUM BLD-SCNC: 3.7 MMOL/L (ref 3.5–4.9)
PROT SERPL-MCNC: 6.6 GM/DL (ref 5.8–7.6)
RBC # BLD AUTO: 3.55 X10(6)/MCL (ref 4.2–5.4)
SODIUM BLD-SCNC: 140 MMOL/L (ref 138–146)
WBC # SPEC AUTO: 5.8 X10(3)/MCL (ref 4.5–11.5)

## 2020-07-16 ENCOUNTER — HISTORICAL (OUTPATIENT)
Dept: INFUSION THERAPY | Facility: HOSPITAL | Age: 65
End: 2020-07-16

## 2020-07-22 ENCOUNTER — HISTORICAL (OUTPATIENT)
Dept: LAB | Facility: HOSPITAL | Age: 65
End: 2020-07-22

## 2020-07-22 LAB
ABS NEUT (OLG): 3.46 X10(3)/MCL (ref 2.1–9.2)
BASOPHILS # BLD AUTO: 0 X10(3)/MCL (ref 0–0.2)
BASOPHILS NFR BLD AUTO: 1 %
EOSINOPHIL # BLD AUTO: 0.4 X10(3)/MCL (ref 0–0.9)
EOSINOPHIL NFR BLD AUTO: 9 %
ERYTHROCYTE [DISTWIDTH] IN BLOOD BY AUTOMATED COUNT: 12.8 % (ref 11.5–17)
HCT VFR BLD AUTO: 32.3 % (ref 37–47)
HGB BLD-MCNC: 10.4 GM/DL (ref 12–16)
IMM GRANULOCYTES # BLD AUTO: 0.03 % (ref 0–0.02)
IMM GRANULOCYTES NFR BLD AUTO: 0.6 % (ref 0–0.43)
LYMPHOCYTES # BLD AUTO: 0.9 X10(3)/MCL (ref 0.6–4.6)
LYMPHOCYTES NFR BLD AUTO: 17 %
MCH RBC QN AUTO: 30 PG (ref 27–31)
MCHC RBC AUTO-ENTMCNC: 32.2 GM/DL (ref 33–36)
MCV RBC AUTO: 93.1 FL (ref 80–94)
MONOCYTES # BLD AUTO: 0.2 X10(3)/MCL (ref 0.1–1.3)
MONOCYTES NFR BLD AUTO: 5 %
NEUTROPHILS # BLD AUTO: 3.46 X10(3)/MCL (ref 1.4–7.9)
NEUTROPHILS NFR BLD AUTO: 68 %
PLATELET # BLD AUTO: 363 X10(3)/MCL (ref 130–400)
PMV BLD AUTO: 10.1 FL (ref 9.4–12.4)
RBC # BLD AUTO: 3.47 X10(6)/MCL (ref 4.2–5.4)
WBC # SPEC AUTO: 5.1 X10(3)/MCL (ref 4.5–11.5)

## 2020-07-27 ENCOUNTER — HISTORICAL (OUTPATIENT)
Dept: LAB | Facility: HOSPITAL | Age: 65
End: 2020-07-27

## 2020-07-27 LAB
ALBUMIN SERPL-MCNC: 3.6 GM/DL (ref 3.4–5)
ALBUMIN/GLOB SERPL: 1 RATIO (ref 1.1–2)
ALP SERPL-CCNC: 92 UNIT/L (ref 46–116)
ALT SERPL-CCNC: 31 UNIT/L (ref 12–78)
AST SERPL-CCNC: 28 UNIT/L (ref 15–37)
BILIRUB SERPL-MCNC: 0.4 MG/DL (ref 0.2–1)
BILIRUBIN DIRECT+TOT PNL SERPL-MCNC: 0.16 MG/DL (ref 0–0.2)
BILIRUBIN DIRECT+TOT PNL SERPL-MCNC: 0.24 MG/DL (ref 0–0.8)
BUN SERPL-MCNC: 13.7 MG/DL (ref 7–18)
CALCIUM SERPL-MCNC: 9.3 MG/DL (ref 8.5–10.1)
CHLORIDE SERPL-SCNC: 101 MMOL/L (ref 98–107)
CO2 SERPL-SCNC: 30.8 MMOL/L (ref 21–32)
CREAT SERPL-MCNC: 0.74 MG/DL (ref 0.6–1.3)
DEPRECATED CALCIDIOL+CALCIFEROL SERPL-MC: 57.9 NG/ML (ref 6.6–49.9)
ERYTHROCYTE [DISTWIDTH] IN BLOOD BY AUTOMATED COUNT: 13.1 % (ref 11.5–17)
GLOBULIN SER-MCNC: 3.5 GM/DL (ref 2.4–3.5)
GLUCOSE SERPL-MCNC: 279 MG/DL (ref 74–106)
HCT VFR BLD AUTO: 33.3 % (ref 37–47)
HGB BLD-MCNC: 10.5 GM/DL (ref 12–16)
MAGNESIUM SERPL-MCNC: 1.5 MG/DL (ref 1.8–2.4)
MCH RBC QN AUTO: 29.9 PG (ref 27–31)
MCHC RBC AUTO-ENTMCNC: 31.5 GM/DL (ref 33–36)
MCV RBC AUTO: 94.9 FL (ref 80–94)
PLATELET # BLD AUTO: 386 X10(3)/MCL (ref 130–400)
PMV BLD AUTO: 9.5 FL (ref 9.4–12.4)
POTASSIUM SERPL-SCNC: 4.1 MMOL/L (ref 3.5–5.1)
PROT SERPL-MCNC: 7.1 GM/DL (ref 6.4–8.2)
RBC # BLD AUTO: 3.51 X10(6)/MCL (ref 4.2–5.4)
SODIUM SERPL-SCNC: 140 MMOL/L (ref 136–145)
VIT B12 SERPL-MCNC: 536 PG/ML (ref 193–986)
WBC # SPEC AUTO: 4.6 X10(3)/MCL (ref 4.5–11.5)

## 2020-07-29 ENCOUNTER — HISTORICAL (OUTPATIENT)
Dept: ADMINISTRATIVE | Facility: HOSPITAL | Age: 65
End: 2020-07-29

## 2020-09-04 ENCOUNTER — HISTORICAL (OUTPATIENT)
Dept: RADIOLOGY | Facility: HOSPITAL | Age: 65
End: 2020-09-04

## 2020-09-08 ENCOUNTER — HISTORICAL (OUTPATIENT)
Dept: INFUSION THERAPY | Facility: HOSPITAL | Age: 65
End: 2020-09-08

## 2020-09-08 LAB
ABS NEUT (OLG): 3.71 X10(3)/MCL (ref 2.1–9.2)
ALBUMIN SERPL-MCNC: 3.6 GM/DL (ref 3.4–4.8)
ALBUMIN/GLOB SERPL: 1.6 RATIO (ref 1.1–2)
ALP SERPL-CCNC: 91 UNIT/L (ref 40–150)
ALT SERPL-CCNC: 28 UNIT/L (ref 0–55)
AST SERPL-CCNC: 52 UNIT/L (ref 5–34)
BASOPHILS # BLD AUTO: 0 X10(3)/MCL (ref 0–0.2)
BASOPHILS NFR BLD AUTO: 0.9 %
BILIRUB SERPL-MCNC: 0.3 MG/DL
BILIRUBIN DIRECT+TOT PNL SERPL-MCNC: 0.1 MG/DL (ref 0–0.8)
BILIRUBIN DIRECT+TOT PNL SERPL-MCNC: 0.2 MG/DL (ref 0–0.5)
BUN SERPL-MCNC: 8.9 MG/DL (ref 9.8–20.1)
CALCIUM SERPL-MCNC: 8.9 MG/DL (ref 8.4–10.2)
CHLORIDE SERPL-SCNC: 100 MMOL/L (ref 98–107)
CO2 SERPL-SCNC: 25 MMOL/L (ref 23–31)
CREAT SERPL-MCNC: 0.67 MG/DL (ref 0.55–1.02)
EOSINOPHIL # BLD AUTO: 0.3 X10(3)/MCL (ref 0–0.9)
EOSINOPHIL NFR BLD AUTO: 5.4 %
ERYTHROCYTE [DISTWIDTH] IN BLOOD BY AUTOMATED COUNT: 13.3 % (ref 11.5–17)
GLOBULIN SER-MCNC: 2.2 GM/DL (ref 2.4–3.5)
GLUCOSE SERPL-MCNC: 147 MG/DL (ref 82–115)
HCT VFR BLD AUTO: 36.2 % (ref 37–47)
HGB BLD-MCNC: 11.5 GM/DL (ref 12–16)
LYMPHOCYTES # BLD AUTO: 1 X10(3)/MCL (ref 0.6–4.6)
LYMPHOCYTES NFR BLD AUTO: 17.8 %
MCH RBC QN AUTO: 30.8 PG (ref 27–31)
MCHC RBC AUTO-ENTMCNC: 31.8 GM/DL (ref 33–36)
MCV RBC AUTO: 97.1 FL (ref 80–94)
MONOCYTES # BLD AUTO: 0.5 X10(3)/MCL (ref 0.1–1.3)
MONOCYTES NFR BLD AUTO: 9 %
NEUTROPHILS # BLD AUTO: 3.7 X10(3)/MCL (ref 2.1–9.2)
NEUTROPHILS NFR BLD AUTO: 66.7 %
PLATELET # BLD AUTO: 256 X10(3)/MCL (ref 130–400)
PMV BLD AUTO: 8.8 FL (ref 9.4–12.4)
POTASSIUM SERPL-SCNC: 5 MMOL/L (ref 3.5–5.1)
PROT SERPL-MCNC: 5.8 GM/DL (ref 5.8–7.6)
RBC # BLD AUTO: 3.73 X10(6)/MCL (ref 4.2–5.4)
SODIUM SERPL-SCNC: 140 MMOL/L (ref 136–145)
WBC # SPEC AUTO: 5.6 X10(3)/MCL (ref 4.5–11.5)

## 2020-09-11 ENCOUNTER — HISTORICAL (OUTPATIENT)
Dept: RADIOLOGY | Facility: HOSPITAL | Age: 65
End: 2020-09-11

## 2020-09-15 ENCOUNTER — HISTORICAL (OUTPATIENT)
Dept: INFUSION THERAPY | Facility: HOSPITAL | Age: 65
End: 2020-09-15

## 2020-09-15 LAB
ABS NEUT (OLG): 1.65 X10(3)/MCL (ref 2.1–9.2)
ALBUMIN SERPL-MCNC: 3.7 GM/DL (ref 3.4–4.8)
ALBUMIN/GLOB SERPL: 1.6 RATIO (ref 1.1–2)
ALP SERPL-CCNC: 74 UNIT/L (ref 40–150)
ALT SERPL-CCNC: 30 UNIT/L (ref 0–55)
AST SERPL-CCNC: 56 UNIT/L (ref 5–34)
BASOPHILS # BLD AUTO: 0 X10(3)/MCL (ref 0–0.2)
BASOPHILS NFR BLD AUTO: 1 %
BASOPHILS NFR BLD MANUAL: 1 % (ref 0–2)
BILIRUB SERPL-MCNC: 0.3 MG/DL
BILIRUBIN DIRECT+TOT PNL SERPL-MCNC: 0.1 MG/DL (ref 0–0.8)
BILIRUBIN DIRECT+TOT PNL SERPL-MCNC: 0.2 MG/DL (ref 0–0.5)
BUN SERPL-MCNC: 13.4 MG/DL (ref 9.8–20.1)
CALCIUM SERPL-MCNC: 9.1 MG/DL (ref 8.4–10.2)
CHLORIDE SERPL-SCNC: 100 MMOL/L (ref 98–107)
CO2 SERPL-SCNC: 30 MMOL/L (ref 23–31)
CREAT SERPL-MCNC: 0.69 MG/DL (ref 0.55–1.02)
EOSINOPHIL # BLD AUTO: 0.2 X10(3)/MCL (ref 0–0.9)
EOSINOPHIL NFR BLD AUTO: 6.2 %
EOSINOPHIL NFR BLD MANUAL: 5 % (ref 0–8)
ERYTHROCYTE [DISTWIDTH] IN BLOOD BY AUTOMATED COUNT: 13 % (ref 11.5–17)
GLOBULIN SER-MCNC: 2.3 GM/DL (ref 2.4–3.5)
GLUCOSE SERPL-MCNC: 201 MG/DL (ref 82–115)
HCT VFR BLD AUTO: 34.8 % (ref 37–47)
HGB BLD-MCNC: 11 GM/DL (ref 12–16)
LYMPHOCYTES # BLD AUTO: 0.9 X10(3)/MCL (ref 0.6–4.6)
LYMPHOCYTES NFR BLD AUTO: 30 %
LYMPHOCYTES NFR BLD MANUAL: 31 % (ref 13–40)
MCH RBC QN AUTO: 30.6 PG (ref 27–31)
MCHC RBC AUTO-ENTMCNC: 31.6 GM/DL (ref 33–36)
MCV RBC AUTO: 96.9 FL (ref 80–94)
MONOCYTES # BLD AUTO: 0.2 X10(3)/MCL (ref 0.1–1.3)
MONOCYTES NFR BLD AUTO: 5.9 %
MONOCYTES NFR BLD MANUAL: 7 % (ref 2–11)
NEUTROPHILS # BLD AUTO: 1.6 X10(3)/MCL (ref 2.1–9.2)
NEUTROPHILS NFR BLD AUTO: 56.9 %
NEUTROPHILS NFR BLD MANUAL: 56 % (ref 47–80)
PLATELET # BLD AUTO: 206 X10(3)/MCL (ref 130–400)
PLATELET # BLD EST: NORMAL 10*3/UL
PMV BLD AUTO: 8.9 FL (ref 9.4–12.4)
POTASSIUM SERPL-SCNC: 4.7 MMOL/L (ref 3.5–5.1)
PROT SERPL-MCNC: 6 GM/DL (ref 5.8–7.6)
RBC # BLD AUTO: 3.59 X10(6)/MCL (ref 4.2–5.4)
RBC MORPH BLD: NORMAL
SODIUM SERPL-SCNC: 139 MMOL/L (ref 136–145)
WBC # SPEC AUTO: 2.9 X10(3)/MCL (ref 4.5–11.5)

## 2020-09-29 ENCOUNTER — HISTORICAL (OUTPATIENT)
Dept: INFUSION THERAPY | Facility: HOSPITAL | Age: 65
End: 2020-09-29

## 2020-09-29 LAB
ABS NEUT (OLG): 3.02 X10(3)/MCL (ref 2.1–9.2)
ANION GAP SERPL CALC-SCNC: 17 MMOL/L
BASOPHILS # BLD AUTO: 0 X10(3)/MCL (ref 0–0.2)
BASOPHILS NFR BLD AUTO: 0.6 %
BUN SERPL-MCNC: 13 MG/DL (ref 8–26)
CHLORIDE SERPL-SCNC: 100 MMOL/L (ref 98–109)
CREAT SERPL-MCNC: 0.7 MG/DL (ref 0.6–1.3)
EOSINOPHIL # BLD AUTO: 0.2 X10(3)/MCL (ref 0–0.9)
EOSINOPHIL NFR BLD AUTO: 4.7 %
ERYTHROCYTE [DISTWIDTH] IN BLOOD BY AUTOMATED COUNT: 14.1 % (ref 11.5–17)
GLUCOSE SERPL-MCNC: 260 MG/DL (ref 70–105)
HCT VFR BLD AUTO: 36 % (ref 37–47)
HCT VFR BLD CALC: 38 % (ref 38–51)
HGB BLD-MCNC: 11.4 GM/DL (ref 12–16)
HGB BLD-MCNC: 12.9 MG/DL (ref 12–17)
LYMPHOCYTES # BLD AUTO: 1.2 X10(3)/MCL (ref 0.6–4.6)
LYMPHOCYTES NFR BLD AUTO: 24.2 %
MCH RBC QN AUTO: 30.8 PG (ref 27–31)
MCHC RBC AUTO-ENTMCNC: 31.7 GM/DL (ref 33–36)
MCV RBC AUTO: 97.3 FL (ref 80–94)
MONOCYTES # BLD AUTO: 0.4 X10(3)/MCL (ref 0.1–1.3)
MONOCYTES NFR BLD AUTO: 8.4 %
NEUTROPHILS # BLD AUTO: 3 X10(3)/MCL (ref 2.1–9.2)
NEUTROPHILS NFR BLD AUTO: 61.9 %
PLATELET # BLD AUTO: 380 X10(3)/MCL (ref 130–400)
PMV BLD AUTO: 9.1 FL (ref 9.4–12.4)
POC IONIZED CALCIUM: 1.15 MMOL/L (ref 1.12–1.32)
POC TCO2: 27 MMOL/L (ref 24–29)
POTASSIUM BLD-SCNC: 3.9 MMOL/L (ref 3.5–4.9)
RBC # BLD AUTO: 3.7 X10(6)/MCL (ref 4.2–5.4)
SODIUM BLD-SCNC: 139 MMOL/L (ref 138–146)
WBC # SPEC AUTO: 4.9 X10(3)/MCL (ref 4.5–11.5)

## 2020-10-06 ENCOUNTER — HISTORICAL (OUTPATIENT)
Dept: LAB | Facility: HOSPITAL | Age: 65
End: 2020-10-06

## 2020-10-06 ENCOUNTER — HISTORICAL (OUTPATIENT)
Dept: INFUSION THERAPY | Facility: HOSPITAL | Age: 65
End: 2020-10-06

## 2020-10-06 LAB
ABS NEUT (OLG): 2.89 X10(3)/MCL (ref 2.1–9.2)
ALBUMIN SERPL-MCNC: 3.5 GM/DL (ref 3.4–5)
ALBUMIN/GLOB SERPL: 1 RATIO (ref 1.1–2)
ALP SERPL-CCNC: 98 UNIT/L (ref 46–116)
ALT SERPL-CCNC: 49 UNIT/L (ref 12–78)
AST SERPL-CCNC: 52 UNIT/L (ref 15–37)
BASOPHILS # BLD AUTO: 0 X10(3)/MCL (ref 0–0.2)
BASOPHILS NFR BLD AUTO: 0.8 %
BILIRUB SERPL-MCNC: 0.3 MG/DL (ref 0.2–1)
BILIRUBIN DIRECT+TOT PNL SERPL-MCNC: 0.14 MG/DL (ref 0–0.8)
BILIRUBIN DIRECT+TOT PNL SERPL-MCNC: 0.16 MG/DL (ref 0–0.2)
BUN SERPL-MCNC: 12.2 MG/DL (ref 7–18)
CALCIUM SERPL-MCNC: 9.1 MG/DL (ref 8.5–10.1)
CHLORIDE SERPL-SCNC: 103 MMOL/L (ref 98–107)
CO2 SERPL-SCNC: 28.7 MMOL/L (ref 21–32)
CREAT SERPL-MCNC: 0.91 MG/DL (ref 0.6–1.3)
EOSINOPHIL # BLD AUTO: 0 X10(3)/MCL (ref 0–0.9)
EOSINOPHIL NFR BLD AUTO: 1.1 %
ERYTHROCYTE [DISTWIDTH] IN BLOOD BY AUTOMATED COUNT: 14.4 % (ref 11.5–17)
GLOBULIN SER-MCNC: 3.4 GM/DL (ref 2.4–3.5)
GLUCOSE SERPL-MCNC: 100 MG/DL (ref 74–106)
HCT VFR BLD AUTO: 31.6 % (ref 37–47)
HGB BLD-MCNC: 10.2 GM/DL (ref 12–16)
LYMPHOCYTES # BLD AUTO: 0.6 X10(3)/MCL (ref 0.6–4.6)
LYMPHOCYTES NFR BLD AUTO: 15.4 %
MCH RBC QN AUTO: 31 PG (ref 27–31)
MCHC RBC AUTO-ENTMCNC: 32.3 GM/DL (ref 33–36)
MCV RBC AUTO: 96 FL (ref 80–94)
MONOCYTES # BLD AUTO: 0.2 X10(3)/MCL (ref 0.1–1.3)
MONOCYTES NFR BLD AUTO: 4.6 %
NEUTROPHILS # BLD AUTO: 2.9 X10(3)/MCL (ref 2.1–9.2)
NEUTROPHILS NFR BLD AUTO: 77.8 %
PLATELET # BLD AUTO: 485 X10(3)/MCL (ref 130–400)
PMV BLD AUTO: 9.2 FL (ref 9.4–12.4)
POTASSIUM SERPL-SCNC: 4.4 MMOL/L (ref 3.5–5.1)
PROT SERPL-MCNC: 6.9 GM/DL (ref 6.4–8.2)
RBC # BLD AUTO: 3.29 X10(6)/MCL (ref 4.2–5.4)
RESTICK: NORMAL
SODIUM SERPL-SCNC: 140 MMOL/L (ref 136–145)
WBC # SPEC AUTO: 3.7 X10(3)/MCL (ref 4.5–11.5)

## 2020-10-26 ENCOUNTER — HISTORICAL (OUTPATIENT)
Dept: INFUSION THERAPY | Facility: HOSPITAL | Age: 65
End: 2020-10-26

## 2020-10-26 LAB
ABS NEUT (OLG): 0.99 X10(3)/MCL (ref 2.1–9.2)
ANION GAP SERPL CALC-SCNC: 16 MMOL/L
ANISOCYTOSIS BLD QL SMEAR: SLIGHT
BASOPHILS # BLD AUTO: 0.1 X10(3)/MCL (ref 0–0.2)
BASOPHILS NFR BLD AUTO: 3.3 %
BASOPHILS NFR BLD MANUAL: 4 % (ref 0–2)
BUN SERPL-MCNC: 12 MG/DL (ref 8–26)
CHLORIDE SERPL-SCNC: 102 MMOL/L (ref 98–109)
CREAT SERPL-MCNC: 0.8 MG/DL (ref 0.6–1.3)
EOSINOPHIL # BLD AUTO: 0.2 X10(3)/MCL (ref 0–0.9)
EOSINOPHIL NFR BLD AUTO: 5.4 %
EOSINOPHIL NFR BLD MANUAL: 2 % (ref 0–8)
ERYTHROCYTE [DISTWIDTH] IN BLOOD BY AUTOMATED COUNT: 16.7 % (ref 11.5–17)
GLUCOSE SERPL-MCNC: 140 MG/DL (ref 70–105)
HCT VFR BLD AUTO: 35.4 % (ref 37–47)
HCT VFR BLD CALC: 37 % (ref 38–51)
HGB BLD-MCNC: 11.3 GM/DL (ref 12–16)
HGB BLD-MCNC: 12.6 MG/DL (ref 12–17)
LYMPHOCYTES # BLD AUTO: 1 X10(3)/MCL (ref 0.6–4.6)
LYMPHOCYTES NFR BLD AUTO: 35.1 %
LYMPHOCYTES NFR BLD MANUAL: 42 % (ref 13–40)
MCH RBC QN AUTO: 31.7 PG (ref 27–31)
MCHC RBC AUTO-ENTMCNC: 31.9 GM/DL (ref 33–36)
MCV RBC AUTO: 99.2 FL (ref 80–94)
METAMYELOCYTES NFR BLD MANUAL: 2 %
MONOCYTES # BLD AUTO: 0.6 X10(3)/MCL (ref 0.1–1.3)
MONOCYTES NFR BLD AUTO: 20.7 %
MONOCYTES NFR BLD MANUAL: 17 % (ref 2–11)
NEUTROPHILS # BLD AUTO: 1 X10(3)/MCL (ref 2.1–9.2)
NEUTROPHILS NFR BLD AUTO: 33.2 %
NEUTROPHILS NFR BLD MANUAL: 33 % (ref 47–80)
PLATELET # BLD AUTO: 782 X10(3)/MCL (ref 130–400)
PLATELET # BLD EST: ABNORMAL 10*3/UL
PMV BLD AUTO: 8.4 FL (ref 9.4–12.4)
POC IONIZED CALCIUM: 1.24 MMOL/L (ref 1.12–1.32)
POC TCO2: 28 MMOL/L (ref 24–29)
POTASSIUM BLD-SCNC: 4.8 MMOL/L (ref 3.5–4.9)
RBC # BLD AUTO: 3.57 X10(6)/MCL (ref 4.2–5.4)
RBC MORPH BLD: ABNORMAL
SODIUM BLD-SCNC: 140 MMOL/L (ref 138–146)
WBC # SPEC AUTO: 3 X10(3)/MCL (ref 4.5–11.5)

## 2020-11-02 ENCOUNTER — HISTORICAL (OUTPATIENT)
Dept: INFUSION THERAPY | Facility: HOSPITAL | Age: 65
End: 2020-11-02

## 2020-11-02 LAB
ABS NEUT (OLG): 1.66 X10(3)/MCL (ref 2.1–9.2)
ANION GAP SERPL CALC-SCNC: 17 MMOL/L
BASOPHILS # BLD AUTO: 0.1 X10(3)/MCL (ref 0–0.2)
BASOPHILS NFR BLD AUTO: 2.1 %
BUN SERPL-MCNC: 7 MG/DL (ref 8–26)
CHLORIDE SERPL-SCNC: 99 MMOL/L (ref 98–109)
CREAT SERPL-MCNC: 0.6 MG/DL (ref 0.6–1.3)
EOSINOPHIL # BLD AUTO: 0.1 X10(3)/MCL (ref 0–0.9)
EOSINOPHIL NFR BLD AUTO: 2.6 %
ERYTHROCYTE [DISTWIDTH] IN BLOOD BY AUTOMATED COUNT: 15.5 % (ref 11.5–17)
GLUCOSE SERPL-MCNC: 432 MG/DL (ref 70–105)
HCT VFR BLD AUTO: 30.9 % (ref 37–47)
HCT VFR BLD CALC: 33 % (ref 38–51)
HGB BLD-MCNC: 11.2 MG/DL (ref 12–17)
HGB BLD-MCNC: 9.9 GM/DL (ref 12–16)
LYMPHOCYTES # BLD AUTO: 1.1 X10(3)/MCL (ref 0.6–4.6)
LYMPHOCYTES NFR BLD AUTO: 32.4 %
MCH RBC QN AUTO: 31.9 PG (ref 27–31)
MCHC RBC AUTO-ENTMCNC: 32 GM/DL (ref 33–36)
MCV RBC AUTO: 99.7 FL (ref 80–94)
MONOCYTES # BLD AUTO: 0.4 X10(3)/MCL (ref 0.1–1.3)
MONOCYTES NFR BLD AUTO: 10.9 %
NEUTROPHILS # BLD AUTO: 1.7 X10(3)/MCL (ref 2.1–9.2)
NEUTROPHILS NFR BLD AUTO: 48.8 %
PLATELET # BLD AUTO: 412 X10(3)/MCL (ref 130–400)
PMV BLD AUTO: 9.1 FL (ref 9.4–12.4)
POC IONIZED CALCIUM: 1.12 MMOL/L (ref 1.12–1.32)
POC TCO2: 23 MMOL/L (ref 24–29)
POTASSIUM BLD-SCNC: 4.2 MMOL/L (ref 3.5–4.9)
RBC # BLD AUTO: 3.1 X10(6)/MCL (ref 4.2–5.4)
SODIUM BLD-SCNC: 134 MMOL/L (ref 138–146)
WBC # SPEC AUTO: 3.4 X10(3)/MCL (ref 4.5–11.5)

## 2020-11-09 ENCOUNTER — HISTORICAL (OUTPATIENT)
Dept: LAB | Facility: HOSPITAL | Age: 65
End: 2020-11-09

## 2020-11-09 LAB
ABS NEUT (OLG): 2.38 X10(3)/MCL (ref 2.1–9.2)
ALBUMIN SERPL-MCNC: 3.19 GM/DL (ref 3.4–5)
ALBUMIN/GLOB SERPL: 1 RATIO (ref 1.1–2)
ALP SERPL-CCNC: 87 UNIT/L (ref 46–116)
ALT SERPL-CCNC: 40 UNIT/L (ref 12–78)
AST SERPL-CCNC: 45 UNIT/L (ref 15–37)
BASOPHILS # BLD AUTO: 0 X10(3)/MCL (ref 0–0.2)
BASOPHILS NFR BLD AUTO: 1 %
BILIRUB SERPL-MCNC: 0.3 MG/DL (ref 0.2–1)
BILIRUBIN DIRECT+TOT PNL SERPL-MCNC: 0.1 MG/DL (ref 0–0.8)
BILIRUBIN DIRECT+TOT PNL SERPL-MCNC: 0.2 MG/DL (ref 0–0.2)
BUN SERPL-MCNC: 7.8 MG/DL (ref 7–18)
CALCIUM SERPL-MCNC: 9.5 MG/DL (ref 8.5–10.1)
CHLORIDE SERPL-SCNC: 103 MMOL/L (ref 98–107)
CO2 SERPL-SCNC: 31.4 MMOL/L (ref 21–32)
CREAT SERPL-MCNC: 0.69 MG/DL (ref 0.6–1.3)
DEPRECATED CALCIDIOL+CALCIFEROL SERPL-MC: 61.4 NG/ML (ref 30–80)
EOSINOPHIL # BLD AUTO: 0.2 X10(3)/MCL (ref 0–0.9)
EOSINOPHIL NFR BLD AUTO: 4 %
ERYTHROCYTE [DISTWIDTH] IN BLOOD BY AUTOMATED COUNT: 15.3 % (ref 11.5–17)
FERRITIN SERPL-MCNC: 464 NG/ML (ref 8–388)
GLOBULIN SER-MCNC: 3.11 GM/DL (ref 2.4–3.5)
GLUCOSE SERPL-MCNC: 113 MG/DL (ref 74–106)
HCT VFR BLD AUTO: 32.7 % (ref 37–47)
HGB BLD-MCNC: 10.3 GM/DL (ref 12–16)
IMM GRANULOCYTES # BLD AUTO: 0.02 % (ref 0–0.02)
IMM GRANULOCYTES NFR BLD AUTO: 0.5 % (ref 0–0.43)
IRON SATN MFR SERPL: 19.1 % (ref 20–50)
IRON SERPL-MCNC: 41 MCG/DL (ref 50–175)
LYMPHOCYTES # BLD AUTO: 1.1 X10(3)/MCL (ref 0.6–4.6)
LYMPHOCYTES NFR BLD AUTO: 28 %
MAGNESIUM SERPL-MCNC: 1.6 MG/DL (ref 1.8–2.4)
MCH RBC QN AUTO: 32.2 PG (ref 27–31)
MCHC RBC AUTO-ENTMCNC: 31.5 GM/DL (ref 33–36)
MCV RBC AUTO: 102.2 FL (ref 80–94)
MONOCYTES # BLD AUTO: 0.2 X10(3)/MCL (ref 0.1–1.3)
MONOCYTES NFR BLD AUTO: 5 %
NEUTROPHILS # BLD AUTO: 2.38 X10(3)/MCL (ref 1.4–7.9)
NEUTROPHILS NFR BLD AUTO: 62 %
PLATELET # BLD AUTO: 166 X10(3)/MCL (ref 130–400)
PMV BLD AUTO: 8.8 FL (ref 9.4–12.4)
POTASSIUM SERPL-SCNC: 5.1 MMOL/L (ref 3.5–5.1)
PROT SERPL-MCNC: 6.3 GM/DL (ref 6.4–8.2)
RBC # BLD AUTO: 3.2 X10(6)/MCL (ref 4.2–5.4)
SODIUM SERPL-SCNC: 142 MMOL/L (ref 136–145)
TIBC SERPL-MCNC: 215 MCG/DL (ref 250–450)
TRANSFERRIN SERPL-MCNC: 173 MG/DL (ref 200–360)
WBC # SPEC AUTO: 3.9 X10(3)/MCL (ref 4.5–11.5)

## 2020-11-17 ENCOUNTER — HISTORICAL (OUTPATIENT)
Dept: INFUSION THERAPY | Facility: HOSPITAL | Age: 65
End: 2020-11-17

## 2020-11-17 LAB
ABS NEUT (OLG): 5.57 X10(3)/MCL (ref 2.1–9.2)
BASOPHILS # BLD AUTO: 0.1 X10(3)/MCL (ref 0–0.2)
BASOPHILS NFR BLD AUTO: 0.8 %
EOSINOPHIL # BLD AUTO: 0.7 X10(3)/MCL (ref 0–0.9)
EOSINOPHIL NFR BLD AUTO: 9.4 %
ERYTHROCYTE [DISTWIDTH] IN BLOOD BY AUTOMATED COUNT: 17.4 % (ref 11.5–17)
HCT VFR BLD AUTO: 29.7 % (ref 37–47)
HGB BLD-MCNC: 9.6 GM/DL (ref 12–16)
LYMPHOCYTES # BLD AUTO: 0.8 X10(3)/MCL (ref 0.6–4.6)
LYMPHOCYTES NFR BLD AUTO: 10.1 %
MCH RBC QN AUTO: 33.2 PG (ref 27–31)
MCHC RBC AUTO-ENTMCNC: 32.3 GM/DL (ref 33–36)
MCV RBC AUTO: 102.8 FL (ref 80–94)
MONOCYTES # BLD AUTO: 0.6 X10(3)/MCL (ref 0.1–1.3)
MONOCYTES NFR BLD AUTO: 8.1 %
NEUTROPHILS # BLD AUTO: 5.6 X10(3)/MCL (ref 2.1–9.2)
NEUTROPHILS NFR BLD AUTO: 71.5 %
PLATELET # BLD AUTO: 426 X10(3)/MCL (ref 130–400)
PMV BLD AUTO: 8.9 FL (ref 9.4–12.4)
RBC # BLD AUTO: 2.89 X10(6)/MCL (ref 4.2–5.4)
WBC # SPEC AUTO: 7.8 X10(3)/MCL (ref 4.5–11.5)

## 2020-11-20 LAB — BCS RECOMMENDATION EXT: NORMAL

## 2020-11-24 ENCOUNTER — HISTORICAL (OUTPATIENT)
Dept: INFUSION THERAPY | Facility: HOSPITAL | Age: 65
End: 2020-11-24

## 2020-11-24 LAB
ABS NEUT (OLG): 1.49 X10(3)/MCL (ref 2.1–9.2)
ANION GAP SERPL CALC-SCNC: 18 MMOL/L
BASOPHILS # BLD AUTO: 0 X10(3)/MCL (ref 0–0.2)
BASOPHILS NFR BLD AUTO: 1.9 %
BUN SERPL-MCNC: 12 MG/DL (ref 8–26)
CHLORIDE SERPL-SCNC: 99 MMOL/L (ref 98–109)
CREAT SERPL-MCNC: 0.5 MG/DL (ref 0.6–1.3)
EOSINOPHIL # BLD AUTO: 0 X10(3)/MCL (ref 0–0.9)
EOSINOPHIL NFR BLD AUTO: 1.9 %
ERYTHROCYTE [DISTWIDTH] IN BLOOD BY AUTOMATED COUNT: 16.3 % (ref 11.5–17)
GLUCOSE SERPL-MCNC: 294 MG/DL (ref 70–105)
HCT VFR BLD AUTO: 27.9 % (ref 37–47)
HCT VFR BLD CALC: 31 % (ref 38–51)
HGB BLD-MCNC: 10.5 MG/DL (ref 12–17)
HGB BLD-MCNC: 9.1 GM/DL (ref 12–16)
LYMPHOCYTES # BLD AUTO: 0.8 X10(3)/MCL (ref 0.6–4.6)
LYMPHOCYTES NFR BLD AUTO: 29.5 %
MAGNESIUM SERPL-MCNC: 1.5 MG/DL (ref 1.6–2.6)
MCH RBC QN AUTO: 34 PG (ref 27–31)
MCHC RBC AUTO-ENTMCNC: 32.6 GM/DL (ref 33–36)
MCV RBC AUTO: 104.1 FL (ref 80–94)
MONOCYTES # BLD AUTO: 0.2 X10(3)/MCL (ref 0.1–1.3)
MONOCYTES NFR BLD AUTO: 9.5 %
NEUTROPHILS # BLD AUTO: 1.5 X10(3)/MCL (ref 2.1–9.2)
NEUTROPHILS NFR BLD AUTO: 56.4 %
PLATELET # BLD AUTO: 382 X10(3)/MCL (ref 130–400)
PMV BLD AUTO: 8.8 FL (ref 9.4–12.4)
POC IONIZED CALCIUM: 1.14 MMOL/L (ref 1.12–1.32)
POC TCO2: 25 MMOL/L (ref 24–29)
POTASSIUM BLD-SCNC: 3.8 MMOL/L (ref 3.5–4.9)
PREALB SERPL-MCNC: 10.2 MG/DL (ref 14–37)
RBC # BLD AUTO: 2.68 X10(6)/MCL (ref 4.2–5.4)
SODIUM BLD-SCNC: 136 MMOL/L (ref 138–146)
WBC # SPEC AUTO: 2.6 X10(3)/MCL (ref 4.5–11.5)

## 2020-12-16 ENCOUNTER — HISTORICAL (OUTPATIENT)
Dept: INFUSION THERAPY | Facility: HOSPITAL | Age: 65
End: 2020-12-16

## 2020-12-16 LAB
ABS NEUT (OLG): 2.24 X10(3)/MCL (ref 2.1–9.2)
ALBUMIN SERPL-MCNC: 3.1 GM/DL (ref 3.4–4.8)
ALP SERPL-CCNC: 113 UNIT/L (ref 40–150)
ALT SERPL-CCNC: 34 UNIT/L (ref 0–55)
ANION GAP SERPL CALC-SCNC: 15 MMOL/L
AST SERPL-CCNC: 52 UNIT/L (ref 5–34)
BASOPHILS # BLD AUTO: 0 X10(3)/MCL (ref 0–0.2)
BASOPHILS NFR BLD AUTO: 1.4 %
BILIRUB SERPL-MCNC: 0.4 MG/DL
BILIRUBIN DIRECT+TOT PNL SERPL-MCNC: 0.2 MG/DL (ref 0–0.5)
BILIRUBIN DIRECT+TOT PNL SERPL-MCNC: 0.2 MG/DL (ref 0–0.8)
BUN SERPL-MCNC: 8 MG/DL (ref 8–26)
CHLORIDE SERPL-SCNC: 101 MMOL/L (ref 98–109)
CREAT SERPL-MCNC: 0.5 MG/DL (ref 0.6–1.3)
EOSINOPHIL # BLD AUTO: 0.2 X10(3)/MCL (ref 0–0.9)
EOSINOPHIL NFR BLD AUTO: 5.4 %
ERYTHROCYTE [DISTWIDTH] IN BLOOD BY AUTOMATED COUNT: 14.9 % (ref 11.5–17)
GLUCOSE SERPL-MCNC: 176 MG/DL (ref 70–105)
HCT VFR BLD AUTO: 31.4 % (ref 37–47)
HCT VFR BLD CALC: 33 % (ref 38–51)
HGB BLD-MCNC: 10.1 GM/DL (ref 12–16)
HGB BLD-MCNC: 11.2 MG/DL (ref 12–17)
LIVER PROFILE INTERP: ABNORMAL
LYMPHOCYTES # BLD AUTO: 0.8 X10(3)/MCL (ref 0.6–4.6)
LYMPHOCYTES NFR BLD AUTO: 22.5 %
MCH RBC QN AUTO: 35.6 PG (ref 27–31)
MCHC RBC AUTO-ENTMCNC: 32.2 GM/DL (ref 33–36)
MCV RBC AUTO: 110.6 FL (ref 80–94)
MONOCYTES # BLD AUTO: 0.4 X10(3)/MCL (ref 0.1–1.3)
MONOCYTES NFR BLD AUTO: 9.8 %
NEUTROPHILS # BLD AUTO: 2.2 X10(3)/MCL (ref 2.1–9.2)
NEUTROPHILS NFR BLD AUTO: 60.6 %
PLATELET # BLD AUTO: 415 X10(3)/MCL (ref 130–400)
PMV BLD AUTO: 8.8 FL (ref 9.4–12.4)
POC IONIZED CALCIUM: 1.21 MMOL/L (ref 1.12–1.32)
POC TCO2: 28 MMOL/L (ref 24–29)
POTASSIUM BLD-SCNC: 3.4 MMOL/L (ref 3.5–4.9)
PROT SERPL-MCNC: 5.2 GM/DL (ref 5.8–7.6)
RBC # BLD AUTO: 2.84 X10(6)/MCL (ref 4.2–5.4)
SODIUM BLD-SCNC: 139 MMOL/L (ref 138–146)
WBC # SPEC AUTO: 3.7 X10(3)/MCL (ref 4.5–11.5)

## 2020-12-23 ENCOUNTER — HISTORICAL (OUTPATIENT)
Dept: INFUSION THERAPY | Facility: HOSPITAL | Age: 65
End: 2020-12-23

## 2020-12-23 LAB
ABS NEUT (OLG): 1.37 X10(3)/MCL (ref 2.1–9.2)
ALBUMIN SERPL-MCNC: 3.1 GM/DL (ref 3.4–4.8)
ALBUMIN SERPL-MCNC: 3.1 GM/DL (ref 3.4–4.8)
ALBUMIN/GLOB SERPL: 1.4 RATIO (ref 1.1–2)
ALP SERPL-CCNC: 87 UNIT/L (ref 40–150)
ALP SERPL-CCNC: 89 UNIT/L (ref 40–150)
ALT SERPL-CCNC: 41 UNIT/L (ref 0–55)
ALT SERPL-CCNC: 41 UNIT/L (ref 0–55)
ANION GAP SERPL CALC-SCNC: 13 MMOL/L
AST SERPL-CCNC: 81 UNIT/L (ref 5–34)
AST SERPL-CCNC: 82 UNIT/L (ref 5–34)
BASOPHILS # BLD AUTO: 0 X10(3)/MCL (ref 0–0.2)
BASOPHILS NFR BLD AUTO: 1.7 %
BILIRUB SERPL-MCNC: 0.2 MG/DL
BILIRUB SERPL-MCNC: 0.2 MG/DL
BILIRUBIN DIRECT+TOT PNL SERPL-MCNC: 0.1 MG/DL (ref 0–0.5)
BILIRUBIN DIRECT+TOT PNL SERPL-MCNC: 0.1 MG/DL (ref 0–0.5)
BILIRUBIN DIRECT+TOT PNL SERPL-MCNC: 0.1 MG/DL (ref 0–0.8)
BILIRUBIN DIRECT+TOT PNL SERPL-MCNC: 0.1 MG/DL (ref 0–0.8)
BUN SERPL-MCNC: 7 MG/DL (ref 8–26)
BUN SERPL-MCNC: 8.3 MG/DL (ref 9.8–20.1)
CALCIUM SERPL-MCNC: 7.9 MG/DL (ref 8.4–10.2)
CHLORIDE SERPL-SCNC: 101 MMOL/L (ref 98–109)
CHLORIDE SERPL-SCNC: 105 MMOL/L (ref 98–107)
CO2 SERPL-SCNC: 27 MMOL/L (ref 23–31)
CREAT SERPL-MCNC: 0.6 MG/DL (ref 0.6–1.3)
CREAT SERPL-MCNC: 0.62 MG/DL (ref 0.55–1.02)
EOSINOPHIL # BLD AUTO: 0 X10(3)/MCL (ref 0–0.9)
EOSINOPHIL NFR BLD AUTO: 1.7 %
ERYTHROCYTE [DISTWIDTH] IN BLOOD BY AUTOMATED COUNT: 13.6 % (ref 11.5–17)
GLOBULIN SER-MCNC: 2.2 GM/DL (ref 2.4–3.5)
GLUCOSE SERPL-MCNC: 68 MG/DL (ref 82–115)
GLUCOSE SERPL-MCNC: 69 MG/DL (ref 70–105)
HCT VFR BLD AUTO: 29 % (ref 37–47)
HCT VFR BLD CALC: 31 % (ref 38–51)
HGB BLD-MCNC: 10.5 MG/DL (ref 12–17)
HGB BLD-MCNC: 9.5 GM/DL (ref 12–16)
LIVER PROFILE INTERP: ABNORMAL
LYMPHOCYTES # BLD AUTO: 1.1 X10(3)/MCL (ref 0.6–4.6)
LYMPHOCYTES NFR BLD AUTO: 38.4 %
MCH RBC QN AUTO: 35.6 PG (ref 27–31)
MCHC RBC AUTO-ENTMCNC: 32.8 GM/DL (ref 33–36)
MCV RBC AUTO: 108.6 FL (ref 80–94)
MONOCYTES # BLD AUTO: 0.3 X10(3)/MCL (ref 0.1–1.3)
MONOCYTES NFR BLD AUTO: 10.9 %
NEUTROPHILS # BLD AUTO: 1.4 X10(3)/MCL (ref 2.1–9.2)
NEUTROPHILS NFR BLD AUTO: 46.6 %
PLATELET # BLD AUTO: 238 X10(3)/MCL (ref 130–400)
PMV BLD AUTO: 8.7 FL (ref 9.4–12.4)
POC IONIZED CALCIUM: 1.15 MMOL/L (ref 1.12–1.32)
POC TCO2: 29 MMOL/L (ref 24–29)
POTASSIUM BLD-SCNC: 3.5 MMOL/L (ref 3.5–4.9)
POTASSIUM SERPL-SCNC: 3.8 MMOL/L (ref 3.5–5.1)
PROT SERPL-MCNC: 5.2 GM/DL (ref 5.8–7.6)
PROT SERPL-MCNC: 5.3 GM/DL (ref 5.8–7.6)
RBC # BLD AUTO: 2.67 X10(6)/MCL (ref 4.2–5.4)
SODIUM BLD-SCNC: 139 MMOL/L (ref 138–146)
SODIUM SERPL-SCNC: 139 MMOL/L (ref 136–145)
WBC # SPEC AUTO: 2.9 X10(3)/MCL (ref 4.5–11.5)

## 2021-01-06 ENCOUNTER — HISTORICAL (OUTPATIENT)
Dept: INFUSION THERAPY | Facility: HOSPITAL | Age: 66
End: 2021-01-06

## 2021-01-06 LAB
ABS NEUT (OLG): 3.99 X10(3)/MCL (ref 2.1–9.2)
ANION GAP SERPL CALC-SCNC: 13 MMOL/L
BASOPHILS # BLD AUTO: 0 X10(3)/MCL (ref 0–0.2)
BASOPHILS NFR BLD AUTO: 0.4 %
BUN SERPL-MCNC: 13 MG/DL (ref 8–26)
CHLORIDE SERPL-SCNC: 102 MMOL/L (ref 98–109)
CREAT SERPL-MCNC: 0.7 MG/DL (ref 0.6–1.3)
EOSINOPHIL # BLD AUTO: 0.2 X10(3)/MCL (ref 0–0.9)
EOSINOPHIL NFR BLD AUTO: 3.3 %
ERYTHROCYTE [DISTWIDTH] IN BLOOD BY AUTOMATED COUNT: 15.5 % (ref 11.5–17)
GLUCOSE SERPL-MCNC: 174 MG/DL (ref 70–105)
HCT VFR BLD AUTO: 33.5 % (ref 37–47)
HCT VFR BLD CALC: 34 % (ref 38–51)
HGB BLD-MCNC: 10.6 GM/DL (ref 12–16)
HGB BLD-MCNC: 11.6 MG/DL (ref 12–17)
HYPOCHROMIA BLD QL SMEAR: NORMAL
LYMPHOCYTES # BLD AUTO: 0.7 X10(3)/MCL (ref 0.6–4.6)
LYMPHOCYTES NFR BLD AUTO: 13.5 %
MACROCYTES BLD QL SMEAR: NORMAL
MCH RBC QN AUTO: 35.5 PG (ref 27–31)
MCHC RBC AUTO-ENTMCNC: 31.6 GM/DL (ref 33–36)
MCV RBC AUTO: 112 FL (ref 80–94)
MONOCYTES # BLD AUTO: 0.6 X10(3)/MCL (ref 0.1–1.3)
MONOCYTES NFR BLD AUTO: 10 %
NEUTROPHILS # BLD AUTO: 4 X10(3)/MCL (ref 2.1–9.2)
NEUTROPHILS NFR BLD AUTO: 72.8 %
PLATELET # BLD AUTO: 360 X10(3)/MCL (ref 130–400)
PLATELET # BLD EST: NORMAL 10*3/UL
PMV BLD AUTO: 9.1 FL (ref 9.4–12.4)
POC IONIZED CALCIUM: 1.16 MMOL/L (ref 1.12–1.32)
POC TCO2: 28 MMOL/L (ref 24–29)
POTASSIUM BLD-SCNC: 3.8 MMOL/L (ref 3.5–4.9)
RBC # BLD AUTO: 2.99 X10(6)/MCL (ref 4.2–5.4)
RBC MORPH BLD: NORMAL
SODIUM BLD-SCNC: 138 MMOL/L (ref 138–146)
WBC # SPEC AUTO: 5.5 X10(3)/MCL (ref 4.5–11.5)

## 2021-01-13 ENCOUNTER — HISTORICAL (OUTPATIENT)
Dept: INFUSION THERAPY | Facility: HOSPITAL | Age: 66
End: 2021-01-13

## 2021-01-13 LAB
ABS NEUT (OLG): 2.16 X10(3)/MCL (ref 2.1–9.2)
ALBUMIN SERPL-MCNC: 3.1 GM/DL (ref 3.4–4.8)
ALP SERPL-CCNC: 104 UNIT/L (ref 40–150)
ALT SERPL-CCNC: 72 UNIT/L (ref 0–55)
ANION GAP SERPL CALC-SCNC: 15 MMOL/L
AST SERPL-CCNC: 119 UNIT/L (ref 5–34)
BASOPHILS # BLD AUTO: 0.1 X10(3)/MCL (ref 0–0.2)
BASOPHILS NFR BLD AUTO: 1.9 %
BILIRUB SERPL-MCNC: 0.2 MG/DL
BILIRUBIN DIRECT+TOT PNL SERPL-MCNC: 0 MG/DL (ref 0–0.8)
BILIRUBIN DIRECT+TOT PNL SERPL-MCNC: 0.2 MG/DL (ref 0–0.5)
BUN SERPL-MCNC: 16 MG/DL (ref 8–26)
CHLORIDE SERPL-SCNC: 101 MMOL/L (ref 98–109)
CREAT SERPL-MCNC: 0.6 MG/DL (ref 0.6–1.3)
EOSINOPHIL # BLD AUTO: 0 X10(3)/MCL (ref 0–0.9)
EOSINOPHIL NFR BLD AUTO: 0.6 %
ERYTHROCYTE [DISTWIDTH] IN BLOOD BY AUTOMATED COUNT: 14.5 % (ref 11.5–17)
GLUCOSE SERPL-MCNC: 340 MG/DL (ref 70–105)
HCT VFR BLD AUTO: 30.6 % (ref 37–47)
HCT VFR BLD CALC: 32 % (ref 38–51)
HGB BLD-MCNC: 10.9 MG/DL (ref 12–17)
HGB BLD-MCNC: 9.8 GM/DL (ref 12–16)
LIVER PROFILE INTERP: ABNORMAL
LYMPHOCYTES # BLD AUTO: 0.7 X10(3)/MCL (ref 0.6–4.6)
LYMPHOCYTES NFR BLD AUTO: 22.9 %
MCH RBC QN AUTO: 35.4 PG (ref 27–31)
MCHC RBC AUTO-ENTMCNC: 32 GM/DL (ref 33–36)
MCV RBC AUTO: 110.5 FL (ref 80–94)
MONOCYTES # BLD AUTO: 0.2 X10(3)/MCL (ref 0.1–1.3)
MONOCYTES NFR BLD AUTO: 5.7 %
NEUTROPHILS # BLD AUTO: 2.2 X10(3)/MCL (ref 2.1–9.2)
NEUTROPHILS NFR BLD AUTO: 68.6 %
PLATELET # BLD AUTO: 324 X10(3)/MCL (ref 130–400)
PMV BLD AUTO: 8.4 FL (ref 9.4–12.4)
POC IONIZED CALCIUM: 1.15 MMOL/L (ref 1.12–1.32)
POC TCO2: 26 MMOL/L (ref 24–29)
POTASSIUM BLD-SCNC: 3.5 MMOL/L (ref 3.5–4.9)
PROT SERPL-MCNC: 5.6 GM/DL (ref 5.8–7.6)
RBC # BLD AUTO: 2.77 X10(6)/MCL (ref 4.2–5.4)
SODIUM BLD-SCNC: 138 MMOL/L (ref 138–146)
WBC # SPEC AUTO: 3.2 X10(3)/MCL (ref 4.5–11.5)

## 2021-01-27 ENCOUNTER — HISTORICAL (OUTPATIENT)
Dept: INFUSION THERAPY | Facility: HOSPITAL | Age: 66
End: 2021-01-27

## 2021-01-27 LAB
ABS NEUT (OLG): 2.88 X10(3)/MCL (ref 2.1–9.2)
ANION GAP SERPL CALC-SCNC: 14 MMOL/L
BASOPHILS # BLD AUTO: 0 X10(3)/MCL (ref 0–0.2)
BASOPHILS NFR BLD AUTO: 0.7 %
BUN SERPL-MCNC: 13 MG/DL (ref 8–26)
CHLORIDE SERPL-SCNC: 102 MMOL/L (ref 98–109)
CREAT SERPL-MCNC: 0.9 MG/DL (ref 0.6–1.3)
EOSINOPHIL # BLD AUTO: 0.1 X10(3)/MCL (ref 0–0.9)
EOSINOPHIL NFR BLD AUTO: 3.4 %
ERYTHROCYTE [DISTWIDTH] IN BLOOD BY AUTOMATED COUNT: 16.3 % (ref 11.5–17)
GLUCOSE SERPL-MCNC: 236 MG/DL (ref 70–105)
HCT VFR BLD AUTO: 31.1 % (ref 37–47)
HCT VFR BLD CALC: 33 % (ref 38–51)
HGB BLD-MCNC: 11.2 MG/DL (ref 12–17)
HGB BLD-MCNC: 9.8 GM/DL (ref 12–16)
LYMPHOCYTES # BLD AUTO: 0.6 X10(3)/MCL (ref 0.6–4.6)
LYMPHOCYTES NFR BLD AUTO: 14.1 %
MCH RBC QN AUTO: 35.3 PG (ref 27–31)
MCHC RBC AUTO-ENTMCNC: 31.5 GM/DL (ref 33–36)
MCV RBC AUTO: 111.9 FL (ref 80–94)
MONOCYTES # BLD AUTO: 0.5 X10(3)/MCL (ref 0.1–1.3)
MONOCYTES NFR BLD AUTO: 11.4 %
NEUTROPHILS # BLD AUTO: 2.9 X10(3)/MCL (ref 2.1–9.2)
NEUTROPHILS NFR BLD AUTO: 69.9 %
PLATELET # BLD AUTO: 336 X10(3)/MCL (ref 130–400)
PMV BLD AUTO: 8.8 FL (ref 9.4–12.4)
POC IONIZED CALCIUM: 1.1 MMOL/L (ref 1.12–1.32)
POC TCO2: 27 MMOL/L (ref 24–29)
POTASSIUM BLD-SCNC: 3.9 MMOL/L (ref 3.5–4.9)
RBC # BLD AUTO: 2.78 X10(6)/MCL (ref 4.2–5.4)
SODIUM BLD-SCNC: 139 MMOL/L (ref 138–146)
WBC # SPEC AUTO: 4.1 X10(3)/MCL (ref 4.5–11.5)

## 2021-02-03 ENCOUNTER — HISTORICAL (OUTPATIENT)
Dept: INFUSION THERAPY | Facility: HOSPITAL | Age: 66
End: 2021-02-03

## 2021-02-03 LAB
ABS NEUT (OLG): 1.33 X10(3)/MCL (ref 2.1–9.2)
ALBUMIN SERPL-MCNC: 3.3 GM/DL (ref 3.4–4.8)
ALBUMIN/GLOB SERPL: 1.4 RATIO (ref 1.1–2)
ALP SERPL-CCNC: 122 UNIT/L (ref 40–150)
ALT SERPL-CCNC: 97 UNIT/L (ref 0–55)
ANION GAP SERPL CALC-SCNC: 14 MMOL/L
AST SERPL-CCNC: 240 UNIT/L (ref 5–34)
BASOPHILS # BLD AUTO: 0.1 X10(3)/MCL (ref 0–0.2)
BASOPHILS NFR BLD AUTO: 2.6 %
BASOPHILS NFR BLD MANUAL: 2 % (ref 0–2)
BILIRUB SERPL-MCNC: 0.3 MG/DL
BILIRUBIN DIRECT+TOT PNL SERPL-MCNC: 0.1 MG/DL (ref 0–0.8)
BILIRUBIN DIRECT+TOT PNL SERPL-MCNC: 0.2 MG/DL (ref 0–0.5)
BUN SERPL-MCNC: 7 MG/DL (ref 8–26)
BUN SERPL-MCNC: 8.6 MG/DL (ref 9.8–20.1)
CALCIUM SERPL-MCNC: 8.5 MG/DL (ref 8.4–10.2)
CHLORIDE SERPL-SCNC: 101 MMOL/L (ref 98–109)
CHLORIDE SERPL-SCNC: 103 MMOL/L (ref 98–107)
CO2 SERPL-SCNC: 29 MMOL/L (ref 23–31)
CREAT SERPL-MCNC: 0.5 MG/DL (ref 0.6–1.3)
CREAT SERPL-MCNC: 0.63 MG/DL (ref 0.55–1.02)
EOSINOPHIL # BLD AUTO: 0 X10(3)/MCL (ref 0–0.9)
EOSINOPHIL NFR BLD AUTO: 1.3 %
EOSINOPHIL NFR BLD MANUAL: 2 % (ref 0–8)
ERYTHROCYTE [DISTWIDTH] IN BLOOD BY AUTOMATED COUNT: 15 % (ref 11.5–17)
GLOBULIN SER-MCNC: 2.3 GM/DL (ref 2.4–3.5)
GLUCOSE SERPL-MCNC: 205 MG/DL (ref 70–105)
GLUCOSE SERPL-MCNC: 209 MG/DL (ref 82–115)
HCT VFR BLD AUTO: 29.8 % (ref 37–47)
HCT VFR BLD CALC: 33 % (ref 38–51)
HGB BLD-MCNC: 11.2 MG/DL (ref 12–17)
HGB BLD-MCNC: 9.7 GM/DL (ref 12–16)
LYMPHOCYTES # BLD AUTO: 0.7 X10(3)/MCL (ref 0.6–4.6)
LYMPHOCYTES NFR BLD AUTO: 31 %
LYMPHOCYTES NFR BLD MANUAL: 30 % (ref 13–40)
MACROCYTES BLD QL SMEAR: ABNORMAL
MCH RBC QN AUTO: 35.4 PG (ref 27–31)
MCHC RBC AUTO-ENTMCNC: 32.6 GM/DL (ref 33–36)
MCV RBC AUTO: 108.8 FL (ref 80–94)
MONOCYTES # BLD AUTO: 0.2 X10(3)/MCL (ref 0.1–1.3)
MONOCYTES NFR BLD AUTO: 6.9 %
MONOCYTES NFR BLD MANUAL: 7 % (ref 2–11)
NEUTROPHILS # BLD AUTO: 1.3 X10(3)/MCL (ref 2.1–9.2)
NEUTROPHILS NFR BLD AUTO: 57.3 %
NEUTROPHILS NFR BLD MANUAL: 59 % (ref 47–80)
PLATELET # BLD AUTO: 438 X10(3)/MCL (ref 130–400)
PLATELET # BLD EST: ABNORMAL 10*3/UL
PMV BLD AUTO: 8.8 FL (ref 9.4–12.4)
POC IONIZED CALCIUM: 1.08 MMOL/L (ref 1.12–1.32)
POC TCO2: 28 MMOL/L (ref 24–29)
POLYCHROMASIA BLD QL SMEAR: ABNORMAL
POTASSIUM BLD-SCNC: 3.9 MMOL/L (ref 3.5–4.9)
POTASSIUM SERPL-SCNC: 4.2 MMOL/L (ref 3.5–5.1)
PROT SERPL-MCNC: 5.6 GM/DL (ref 5.8–7.6)
RBC # BLD AUTO: 2.74 X10(6)/MCL (ref 4.2–5.4)
RBC MORPH BLD: ABNORMAL
SODIUM BLD-SCNC: 137 MMOL/L (ref 138–146)
SODIUM SERPL-SCNC: 141 MMOL/L (ref 136–145)
WBC # SPEC AUTO: 2.3 X10(3)/MCL (ref 4.5–11.5)

## 2021-02-17 ENCOUNTER — HISTORICAL (OUTPATIENT)
Dept: INFUSION THERAPY | Facility: HOSPITAL | Age: 66
End: 2021-02-17

## 2021-02-17 LAB
ABS NEUT (OLG): 3.08 X10(3)/MCL (ref 2.1–9.2)
ANION GAP SERPL CALC-SCNC: 12 MMOL/L
BASOPHILS # BLD AUTO: 0 X10(3)/MCL (ref 0–0.2)
BASOPHILS NFR BLD AUTO: 0.8 %
BUN SERPL-MCNC: 11 MG/DL (ref 8–26)
CHLORIDE SERPL-SCNC: 99 MMOL/L (ref 98–109)
CREAT SERPL-MCNC: 0.6 MG/DL (ref 0.6–1.3)
EOSINOPHIL # BLD AUTO: 0.2 X10(3)/MCL (ref 0–0.9)
EOSINOPHIL NFR BLD AUTO: 3.8 %
ERYTHROCYTE [DISTWIDTH] IN BLOOD BY AUTOMATED COUNT: 17 % (ref 11.5–17)
GLUCOSE SERPL-MCNC: 204 MG/DL (ref 70–105)
HCT VFR BLD AUTO: 32.3 % (ref 37–47)
HCT VFR BLD CALC: 33 % (ref 38–51)
HGB BLD-MCNC: 10.5 GM/DL (ref 12–16)
HGB BLD-MCNC: 11.2 MG/DL (ref 12–17)
LYMPHOCYTES # BLD AUTO: 0.9 X10(3)/MCL (ref 0.6–4.6)
LYMPHOCYTES NFR BLD AUTO: 19.2 %
MCH RBC QN AUTO: 36.1 PG (ref 27–31)
MCHC RBC AUTO-ENTMCNC: 32.5 GM/DL (ref 33–36)
MCV RBC AUTO: 111 FL (ref 80–94)
MONOCYTES # BLD AUTO: 0.6 X10(3)/MCL (ref 0.1–1.3)
MONOCYTES NFR BLD AUTO: 11.7 %
NEUTROPHILS # BLD AUTO: 3.1 X10(3)/MCL (ref 2.1–9.2)
NEUTROPHILS NFR BLD AUTO: 64.1 %
PLATELET # BLD AUTO: 271 X10(3)/MCL (ref 130–400)
PMV BLD AUTO: 9.1 FL (ref 9.4–12.4)
POC IONIZED CALCIUM: 1.1 MMOL/L (ref 1.12–1.32)
POC TCO2: 31 MMOL/L (ref 24–29)
POTASSIUM BLD-SCNC: 4.2 MMOL/L (ref 3.5–4.9)
RBC # BLD AUTO: 2.91 X10(6)/MCL (ref 4.2–5.4)
SODIUM BLD-SCNC: 137 MMOL/L (ref 138–146)
WBC # SPEC AUTO: 4.8 X10(3)/MCL (ref 4.5–11.5)

## 2021-02-24 ENCOUNTER — HISTORICAL (OUTPATIENT)
Dept: INFUSION THERAPY | Facility: HOSPITAL | Age: 66
End: 2021-02-24

## 2021-02-24 LAB
ABS NEUT (OLG): 0.99 X10(3)/MCL (ref 2.1–9.2)
ALBUMIN SERPL-MCNC: 2.9 GM/DL (ref 3.4–4.8)
ALBUMIN/GLOB SERPL: 1.2 RATIO (ref 1.1–2)
ALP SERPL-CCNC: 129 UNIT/L (ref 40–150)
ALT SERPL-CCNC: 48 UNIT/L (ref 0–55)
AST SERPL-CCNC: 138 UNIT/L (ref 5–34)
BASOPHILS # BLD AUTO: 0.1 X10(3)/MCL (ref 0–0.2)
BASOPHILS NFR BLD AUTO: 2.6 %
BILIRUB SERPL-MCNC: 0.4 MG/DL
BILIRUBIN DIRECT+TOT PNL SERPL-MCNC: 0.2 MG/DL (ref 0–0.5)
BILIRUBIN DIRECT+TOT PNL SERPL-MCNC: 0.2 MG/DL (ref 0–0.8)
BUN SERPL-MCNC: 6.7 MG/DL (ref 9.8–20.1)
CALCIUM SERPL-MCNC: 8.3 MG/DL (ref 8.4–10.2)
CHLORIDE SERPL-SCNC: 101 MMOL/L (ref 98–107)
CO2 SERPL-SCNC: 25 MMOL/L (ref 23–31)
CREAT SERPL-MCNC: 0.63 MG/DL (ref 0.55–1.02)
EOSINOPHIL # BLD AUTO: 0 X10(3)/MCL (ref 0–0.9)
EOSINOPHIL NFR BLD AUTO: 2.2 %
ERYTHROCYTE [DISTWIDTH] IN BLOOD BY AUTOMATED COUNT: 15.8 % (ref 11.5–17)
GLOBULIN SER-MCNC: 2.4 GM/DL (ref 2.4–3.5)
GLUCOSE SERPL-MCNC: 181 MG/DL (ref 82–115)
HCT VFR BLD AUTO: 29.9 % (ref 37–47)
HGB BLD-MCNC: 9.8 GM/DL (ref 12–16)
LYMPHOCYTES # BLD AUTO: 0.9 X10(3)/MCL (ref 0.6–4.6)
LYMPHOCYTES NFR BLD AUTO: 38.5 %
MCH RBC QN AUTO: 36 PG (ref 27–31)
MCHC RBC AUTO-ENTMCNC: 32.8 GM/DL (ref 33–36)
MCV RBC AUTO: 109.9 FL (ref 80–94)
MONOCYTES # BLD AUTO: 0.3 X10(3)/MCL (ref 0.1–1.3)
MONOCYTES NFR BLD AUTO: 13.4 %
NEUTROPHILS # BLD AUTO: 1 X10(3)/MCL (ref 2.1–9.2)
NEUTROPHILS NFR BLD AUTO: 42.9 %
PLATELET # BLD AUTO: 336 X10(3)/MCL (ref 130–400)
PMV BLD AUTO: 9.1 FL (ref 9.4–12.4)
POTASSIUM SERPL-SCNC: 4.5 MMOL/L (ref 3.5–5.1)
PROT SERPL-MCNC: 5.3 GM/DL (ref 5.8–7.6)
RBC # BLD AUTO: 2.72 X10(6)/MCL (ref 4.2–5.4)
SODIUM SERPL-SCNC: 137 MMOL/L (ref 136–145)
WBC # SPEC AUTO: 2.3 X10(3)/MCL (ref 4.5–11.5)

## 2021-03-03 ENCOUNTER — HISTORICAL (OUTPATIENT)
Dept: INFUSION THERAPY | Facility: HOSPITAL | Age: 66
End: 2021-03-03

## 2021-03-03 LAB
ABS NEUT (OLG): 0.32 X10(3)/MCL (ref 2.1–9.2)
ALBUMIN SERPL-MCNC: 3 GM/DL (ref 3.4–4.8)
ALP SERPL-CCNC: 109 UNIT/L (ref 40–150)
ALT SERPL-CCNC: 83 UNIT/L (ref 0–55)
ANION GAP SERPL CALC-SCNC: 19 MMOL/L
AST SERPL-CCNC: 138 UNIT/L (ref 5–34)
BASOPHILS # BLD AUTO: 0 X10(3)/MCL (ref 0–0.2)
BASOPHILS NFR BLD AUTO: 1 %
BILIRUB SERPL-MCNC: 0.4 MG/DL
BILIRUBIN DIRECT+TOT PNL SERPL-MCNC: 0.2 MG/DL (ref 0–0.5)
BILIRUBIN DIRECT+TOT PNL SERPL-MCNC: 0.2 MG/DL (ref 0–0.8)
BUN SERPL-MCNC: 6 MG/DL (ref 8–26)
CHLORIDE SERPL-SCNC: 99 MMOL/L (ref 98–109)
CREAT SERPL-MCNC: 0.5 MG/DL (ref 0.6–1.3)
EOSINOPHIL # BLD AUTO: 0 X10(3)/MCL (ref 0–0.9)
EOSINOPHIL NFR BLD AUTO: 3 %
ERYTHROCYTE [DISTWIDTH] IN BLOOD BY AUTOMATED COUNT: 15.9 % (ref 11.5–17)
GLUCOSE SERPL-MCNC: 152 MG/DL (ref 70–105)
HCT VFR BLD AUTO: 26.7 % (ref 37–47)
HCT VFR BLD CALC: 28 % (ref 38–51)
HGB BLD-MCNC: 8.7 GM/DL (ref 12–16)
HGB BLD-MCNC: 9.5 MG/DL (ref 12–17)
LIVER PROFILE INTERP: ABNORMAL
LYMPHOCYTES # BLD AUTO: 0.6 X10(3)/MCL (ref 0.6–4.6)
LYMPHOCYTES NFR BLD AUTO: 56 %
MCH RBC QN AUTO: 36.3 PG (ref 27–31)
MCHC RBC AUTO-ENTMCNC: 32.6 GM/DL (ref 33–36)
MCV RBC AUTO: 111.3 FL (ref 80–94)
MONOCYTES # BLD AUTO: 0.1 X10(3)/MCL (ref 0.1–1.3)
MONOCYTES NFR BLD AUTO: 8 %
NEUTROPHILS # BLD AUTO: 0.3 X10(3)/MCL (ref 2.1–9.2)
NEUTROPHILS NFR BLD AUTO: 32 %
PLATELET # BLD AUTO: 111 X10(3)/MCL (ref 130–400)
PMV BLD AUTO: 9.3 FL (ref 9.4–12.4)
POC IONIZED CALCIUM: 1.22 MMOL/L (ref 1.12–1.32)
POC TCO2: 27 MMOL/L (ref 24–29)
POTASSIUM BLD-SCNC: 3.4 MMOL/L (ref 3.5–4.9)
PROT SERPL-MCNC: 5.1 GM/DL (ref 5.8–7.6)
RBC # BLD AUTO: 2.4 X10(6)/MCL (ref 4.2–5.4)
SODIUM BLD-SCNC: 140 MMOL/L (ref 138–146)
WBC # SPEC AUTO: 1 X10(3)/MCL (ref 4.5–11.5)

## 2021-04-05 ENCOUNTER — HISTORICAL (OUTPATIENT)
Dept: INFUSION THERAPY | Facility: HOSPITAL | Age: 66
End: 2021-04-05

## 2021-04-05 LAB
ABS NEUT (OLG): 6.77 X10(3)/MCL (ref 2.1–9.2)
ALBUMIN SERPL-MCNC: 2.8 GM/DL (ref 3.4–4.8)
ALP SERPL-CCNC: 142 UNIT/L (ref 40–150)
ALT SERPL-CCNC: 34 UNIT/L (ref 0–55)
ANION GAP SERPL CALC-SCNC: 15 MMOL/L
AST SERPL-CCNC: 43 UNIT/L (ref 5–34)
BASOPHILS # BLD AUTO: 0 X10(3)/MCL (ref 0–0.2)
BASOPHILS NFR BLD AUTO: 0.1 %
BILIRUB SERPL-MCNC: 0.4 MG/DL
BILIRUBIN DIRECT+TOT PNL SERPL-MCNC: 0.2 MG/DL (ref 0–0.5)
BILIRUBIN DIRECT+TOT PNL SERPL-MCNC: 0.2 MG/DL (ref 0–0.8)
BUN SERPL-MCNC: 17 MG/DL (ref 8–26)
CHLORIDE SERPL-SCNC: 101 MMOL/L (ref 98–109)
CREAT SERPL-MCNC: 0.6 MG/DL (ref 0.6–1.3)
EOSINOPHIL # BLD AUTO: 0 X10(3)/MCL (ref 0–0.9)
EOSINOPHIL NFR BLD AUTO: 0 %
ERYTHROCYTE [DISTWIDTH] IN BLOOD BY AUTOMATED COUNT: 13.2 % (ref 11.5–17)
GLUCOSE SERPL-MCNC: 303 MG/DL (ref 70–105)
HCT VFR BLD AUTO: 35.8 % (ref 37–47)
HCT VFR BLD CALC: 38 % (ref 38–51)
HGB BLD-MCNC: 11.8 GM/DL (ref 12–16)
HGB BLD-MCNC: 12.9 MG/DL (ref 12–17)
LIVER PROFILE INTERP: ABNORMAL
LYMPHOCYTES # BLD AUTO: 0.8 X10(3)/MCL (ref 0.6–4.6)
LYMPHOCYTES NFR BLD AUTO: 9.2 %
MCH RBC QN AUTO: 35 PG (ref 27–31)
MCHC RBC AUTO-ENTMCNC: 33 GM/DL (ref 33–36)
MCV RBC AUTO: 106.2 FL (ref 80–94)
MONOCYTES # BLD AUTO: 0.6 X10(3)/MCL (ref 0.1–1.3)
MONOCYTES NFR BLD AUTO: 7.5 %
NEUTROPHILS # BLD AUTO: 6.8 X10(3)/MCL (ref 2.1–9.2)
NEUTROPHILS NFR BLD AUTO: 83 %
PLATELET # BLD AUTO: 193 X10(3)/MCL (ref 130–400)
PMV BLD AUTO: 9.7 FL (ref 9.4–12.4)
POC IONIZED CALCIUM: 1.11 MMOL/L (ref 1.12–1.32)
POC TCO2: 26 MMOL/L (ref 24–29)
POTASSIUM BLD-SCNC: 3.8 MMOL/L (ref 3.5–4.9)
PROT SERPL-MCNC: 5.1 GM/DL (ref 5.8–7.6)
RBC # BLD AUTO: 3.37 X10(6)/MCL (ref 4.2–5.4)
SODIUM BLD-SCNC: 137 MMOL/L (ref 138–146)
WBC # SPEC AUTO: 8.2 X10(3)/MCL (ref 4.5–11.5)

## 2021-04-26 ENCOUNTER — HISTORICAL (OUTPATIENT)
Dept: ADMINISTRATIVE | Facility: HOSPITAL | Age: 66
End: 2021-04-26

## 2021-04-26 LAB
ABS NEUT (OLG): 3.72 X10(3)/MCL (ref 2.1–9.2)
ALBUMIN SERPL-MCNC: 2.9 GM/DL (ref 3.4–4.8)
ALP SERPL-CCNC: 113 UNIT/L (ref 40–150)
ALT SERPL-CCNC: 20 UNIT/L (ref 0–55)
ANION GAP SERPL CALC-SCNC: 14 MMOL/L
AST SERPL-CCNC: 39 UNIT/L (ref 5–34)
BASOPHILS # BLD AUTO: 0.1 X10(3)/MCL (ref 0–0.2)
BASOPHILS NFR BLD AUTO: 1.1 %
BILIRUB SERPL-MCNC: 0.3 MG/DL
BILIRUBIN DIRECT+TOT PNL SERPL-MCNC: 0.1 MG/DL (ref 0–0.8)
BILIRUBIN DIRECT+TOT PNL SERPL-MCNC: 0.2 MG/DL (ref 0–0.5)
BUN SERPL-MCNC: 10 MG/DL (ref 8–26)
CHLORIDE SERPL-SCNC: 98 MMOL/L (ref 98–109)
CREAT SERPL-MCNC: 0.5 MG/DL (ref 0.6–1.3)
EOSINOPHIL # BLD AUTO: 0.1 X10(3)/MCL (ref 0–0.9)
EOSINOPHIL NFR BLD AUTO: 1.7 %
ERYTHROCYTE [DISTWIDTH] IN BLOOD BY AUTOMATED COUNT: 14.1 % (ref 11.5–17)
GLUCOSE SERPL-MCNC: 224 MG/DL (ref 70–105)
HCT VFR BLD AUTO: 32.6 % (ref 37–47)
HCT VFR BLD CALC: 34 % (ref 38–51)
HGB BLD-MCNC: 10.6 GM/DL (ref 12–16)
HGB BLD-MCNC: 11.6 MG/DL (ref 12–17)
LIVER PROFILE INTERP: ABNORMAL
LYMPHOCYTES # BLD AUTO: 0.9 X10(3)/MCL (ref 0.6–4.6)
LYMPHOCYTES NFR BLD AUTO: 17.1 %
MCH RBC QN AUTO: 34.6 PG (ref 27–31)
MCHC RBC AUTO-ENTMCNC: 32.5 GM/DL (ref 33–36)
MCV RBC AUTO: 106.5 FL (ref 80–94)
MONOCYTES # BLD AUTO: 0.6 X10(3)/MCL (ref 0.1–1.3)
MONOCYTES NFR BLD AUTO: 10.6 %
NEUTROPHILS # BLD AUTO: 3.7 X10(3)/MCL (ref 2.1–9.2)
NEUTROPHILS NFR BLD AUTO: 69.1 %
PLATELET # BLD AUTO: 205 X10(3)/MCL (ref 130–400)
PMV BLD AUTO: 10 FL (ref 9.4–12.4)
POC IONIZED CALCIUM: 1.16 MMOL/L (ref 1.12–1.32)
POC TCO2: 29 MMOL/L (ref 24–29)
POTASSIUM BLD-SCNC: 3.9 MMOL/L (ref 3.5–4.9)
PROT SERPL-MCNC: 4.9 GM/DL (ref 5.8–7.6)
RBC # BLD AUTO: 3.06 X10(6)/MCL (ref 4.2–5.4)
SODIUM BLD-SCNC: 137 MMOL/L (ref 138–146)
WBC # SPEC AUTO: 5.4 X10(3)/MCL (ref 4.5–11.5)

## 2021-04-27 ENCOUNTER — HISTORICAL (OUTPATIENT)
Dept: INFUSION THERAPY | Facility: HOSPITAL | Age: 66
End: 2021-04-27

## 2021-05-17 ENCOUNTER — HISTORICAL (OUTPATIENT)
Dept: ADMINISTRATIVE | Facility: HOSPITAL | Age: 66
End: 2021-05-17

## 2021-05-17 LAB
ABS NEUT (OLG): 14.43 X10(3)/MCL (ref 2.1–9.2)
ALT SERPL-CCNC: 41 UNIT/L (ref 0–55)
ANION GAP SERPL CALC-SCNC: 15 MMOL/L
AST SERPL-CCNC: 77 UNIT/L (ref 5–34)
BASOPHILS # BLD AUTO: 0 X10(3)/MCL (ref 0–0.2)
BASOPHILS NFR BLD AUTO: 0.1 %
BILIRUB SERPL-MCNC: 0.3 MG/DL
BILIRUBIN DIRECT+TOT PNL SERPL-MCNC: 0.1 MG/DL (ref 0–0.8)
BILIRUBIN DIRECT+TOT PNL SERPL-MCNC: 0.2 MG/DL (ref 0–0.5)
BUN SERPL-MCNC: 9 MG/DL (ref 8–26)
CHLORIDE SERPL-SCNC: 98 MMOL/L (ref 98–109)
CREAT SERPL-MCNC: 0.6 MG/DL (ref 0.6–1.3)
EOSINOPHIL # BLD AUTO: 0 X10(3)/MCL (ref 0–0.9)
EOSINOPHIL NFR BLD AUTO: 0 %
ERYTHROCYTE [DISTWIDTH] IN BLOOD BY AUTOMATED COUNT: 14.8 % (ref 11.5–17)
GLUCOSE SERPL-MCNC: 211 MG/DL (ref 70–105)
HCT VFR BLD AUTO: 33.3 % (ref 37–47)
HCT VFR BLD CALC: 36 % (ref 38–51)
HGB BLD-MCNC: 10.9 GM/DL (ref 12–16)
HGB BLD-MCNC: 12.2 MG/DL (ref 12–17)
LYMPHOCYTES # BLD AUTO: 1 X10(3)/MCL (ref 0.6–4.6)
LYMPHOCYTES NFR BLD AUTO: 6.1 %
MCH RBC QN AUTO: 33.9 PG (ref 27–31)
MCHC RBC AUTO-ENTMCNC: 32.7 GM/DL (ref 33–36)
MCV RBC AUTO: 103.4 FL (ref 80–94)
MONOCYTES # BLD AUTO: 0.2 X10(3)/MCL (ref 0.1–1.3)
MONOCYTES NFR BLD AUTO: 1.5 %
NEUTROPHILS # BLD AUTO: 14.4 X10(3)/MCL (ref 2.1–9.2)
NEUTROPHILS NFR BLD AUTO: 91.4 %
PLATELET # BLD AUTO: 296 X10(3)/MCL (ref 130–400)
PMV BLD AUTO: 10 FL (ref 9.4–12.4)
POC IONIZED CALCIUM: 1.13 MMOL/L (ref 1.12–1.32)
POC TCO2: 28 MMOL/L (ref 24–29)
POTASSIUM BLD-SCNC: 4.9 MMOL/L (ref 3.5–4.9)
RBC # BLD AUTO: 3.22 X10(6)/MCL (ref 4.2–5.4)
SODIUM BLD-SCNC: 136 MMOL/L (ref 138–146)
WBC # SPEC AUTO: 15.8 X10(3)/MCL (ref 4.5–11.5)

## 2021-06-07 ENCOUNTER — HISTORICAL (OUTPATIENT)
Dept: ADMINISTRATIVE | Facility: HOSPITAL | Age: 66
End: 2021-06-07

## 2021-06-07 LAB
ABS NEUT (OLG): 4.66 X10(3)/MCL (ref 2.1–9.2)
ALBUMIN SERPL-MCNC: 2.6 GM/DL (ref 3.4–4.8)
ALBUMIN/GLOB SERPL: 1.2 RATIO (ref 1.1–2)
ALP SERPL-CCNC: 150 UNIT/L (ref 40–150)
ALT SERPL-CCNC: 29 UNIT/L (ref 0–55)
AST SERPL-CCNC: 61 UNIT/L (ref 5–34)
BASOPHILS # BLD AUTO: 0 X10(3)/MCL (ref 0–0.2)
BASOPHILS NFR BLD AUTO: 0.4 %
BILIRUB SERPL-MCNC: 0.4 MG/DL
BILIRUBIN DIRECT+TOT PNL SERPL-MCNC: 0.2 MG/DL (ref 0–0.5)
BILIRUBIN DIRECT+TOT PNL SERPL-MCNC: 0.2 MG/DL (ref 0–0.8)
BUN SERPL-MCNC: 7.5 MG/DL (ref 9.8–20.1)
CALCIUM SERPL-MCNC: 7.9 MG/DL (ref 8.4–10.2)
CHLORIDE SERPL-SCNC: 100 MMOL/L (ref 98–107)
CO2 SERPL-SCNC: 26 MMOL/L (ref 23–31)
CREAT SERPL-MCNC: 0.64 MG/DL (ref 0.55–1.02)
EOSINOPHIL # BLD AUTO: 0.1 X10(3)/MCL (ref 0–0.9)
EOSINOPHIL NFR BLD AUTO: 0.9 %
ERYTHROCYTE [DISTWIDTH] IN BLOOD BY AUTOMATED COUNT: 15.5 % (ref 11.5–17)
GLOBULIN SER-MCNC: 2.1 GM/DL (ref 2.4–3.5)
GLUCOSE SERPL-MCNC: 231 MG/DL (ref 82–115)
HCT VFR BLD AUTO: 31.4 % (ref 37–47)
HGB BLD-MCNC: 10.5 GM/DL (ref 12–16)
LYMPHOCYTES # BLD AUTO: 1.3 X10(3)/MCL (ref 0.6–4.6)
LYMPHOCYTES NFR BLD AUTO: 19 %
MCH RBC QN AUTO: 34.1 PG (ref 27–31)
MCHC RBC AUTO-ENTMCNC: 33.4 GM/DL (ref 33–36)
MCV RBC AUTO: 101.9 FL (ref 80–94)
MONOCYTES # BLD AUTO: 0.7 X10(3)/MCL (ref 0.1–1.3)
MONOCYTES NFR BLD AUTO: 9.8 %
NEUTROPHILS # BLD AUTO: 4.7 X10(3)/MCL (ref 2.1–9.2)
NEUTROPHILS NFR BLD AUTO: 69.5 %
PLATELET # BLD AUTO: 214 X10(3)/MCL (ref 130–400)
PMV BLD AUTO: 9.6 FL (ref 9.4–12.4)
POTASSIUM SERPL-SCNC: 4.5 MMOL/L (ref 3.5–5.1)
PROT SERPL-MCNC: 4.7 GM/DL (ref 5.8–7.6)
RBC # BLD AUTO: 3.08 X10(6)/MCL (ref 4.2–5.4)
SODIUM SERPL-SCNC: 134 MMOL/L (ref 136–145)
WBC # SPEC AUTO: 6.7 X10(3)/MCL (ref 4.5–11.5)

## 2021-06-08 ENCOUNTER — HISTORICAL (OUTPATIENT)
Dept: INFUSION THERAPY | Facility: HOSPITAL | Age: 66
End: 2021-06-08

## 2021-06-28 ENCOUNTER — HISTORICAL (OUTPATIENT)
Dept: INFUSION THERAPY | Facility: HOSPITAL | Age: 66
End: 2021-06-28

## 2021-06-28 LAB
ABS NEUT (OLG): 6.55 X10(3)/MCL (ref 2.1–9.2)
ALBUMIN SERPL-MCNC: 2.3 GM/DL (ref 3.4–4.8)
ALP SERPL-CCNC: 141 UNIT/L (ref 40–150)
ALT SERPL-CCNC: 21 UNIT/L (ref 0–55)
ANION GAP SERPL CALC-SCNC: 16 MMOL/L
AST SERPL-CCNC: 31 UNIT/L (ref 5–34)
BASOPHILS # BLD AUTO: 0 X10(3)/MCL (ref 0–0.2)
BASOPHILS NFR BLD AUTO: 0.3 %
BILIRUB SERPL-MCNC: 0.4 MG/DL
BILIRUBIN DIRECT+TOT PNL SERPL-MCNC: 0.2 MG/DL (ref 0–0.5)
BILIRUBIN DIRECT+TOT PNL SERPL-MCNC: 0.2 MG/DL (ref 0–0.8)
BUN SERPL-MCNC: 8 MG/DL (ref 8–26)
CHLORIDE SERPL-SCNC: 95 MMOL/L (ref 98–109)
CREAT SERPL-MCNC: 0.5 MG/DL (ref 0.6–1.3)
EOSINOPHIL # BLD AUTO: 0 X10(3)/MCL (ref 0–0.9)
EOSINOPHIL NFR BLD AUTO: 0 %
ERYTHROCYTE [DISTWIDTH] IN BLOOD BY AUTOMATED COUNT: 15.3 % (ref 11.5–17)
FERRITIN SERPL-MCNC: 549.16 NG/ML (ref 4.63–204)
GLUCOSE SERPL-MCNC: 396 MG/DL (ref 70–105)
HCT VFR BLD AUTO: 33 % (ref 37–47)
HCT VFR BLD CALC: 35 % (ref 38–51)
HGB BLD-MCNC: 10.9 GM/DL (ref 12–16)
HGB BLD-MCNC: 11.9 MG/DL (ref 12–17)
IRON SATN MFR SERPL: 39 % (ref 20–50)
IRON SERPL-MCNC: 81 UG/DL (ref 50–170)
LIVER PROFILE INTERP: ABNORMAL
LYMPHOCYTES # BLD AUTO: 0.7 X10(3)/MCL (ref 0.6–4.6)
LYMPHOCYTES NFR BLD AUTO: 9.7 %
MAGNESIUM SERPL-MCNC: 1.6 MG/DL (ref 1.6–2.6)
MCH RBC QN AUTO: 33.6 PG (ref 27–31)
MCHC RBC AUTO-ENTMCNC: 33 GM/DL (ref 33–36)
MCV RBC AUTO: 101.9 FL (ref 80–94)
MONOCYTES # BLD AUTO: 0.2 X10(3)/MCL (ref 0.1–1.3)
MONOCYTES NFR BLD AUTO: 2.5 %
NEUTROPHILS # BLD AUTO: 6.6 X10(3)/MCL (ref 2.1–9.2)
NEUTROPHILS NFR BLD AUTO: 87.2 %
PLATELET # BLD AUTO: 268 X10(3)/MCL (ref 130–400)
PMV BLD AUTO: 9.8 FL (ref 9.4–12.4)
POC IONIZED CALCIUM: 1.17 MMOL/L (ref 1.12–1.32)
POC TCO2: 28 MMOL/L (ref 24–29)
POTASSIUM BLD-SCNC: 4.7 MMOL/L (ref 3.5–4.9)
PROT SERPL-MCNC: 4.9 GM/DL (ref 5.8–7.6)
RBC # BLD AUTO: 3.24 X10(6)/MCL (ref 4.2–5.4)
SODIUM BLD-SCNC: 134 MMOL/L (ref 138–146)
TIBC SERPL-MCNC: 127 UG/DL (ref 70–310)
TIBC SERPL-MCNC: 208 UG/DL (ref 250–450)
TRANSFERRIN SERPL-MCNC: 182 MG/DL (ref 173–360)
WBC # SPEC AUTO: 7.5 X10(3)/MCL (ref 4.5–11.5)

## 2021-07-19 ENCOUNTER — HISTORICAL (OUTPATIENT)
Dept: ADMINISTRATIVE | Facility: HOSPITAL | Age: 66
End: 2021-07-19

## 2021-07-19 LAB
ABS NEUT (OLG): 4.46 X10(3)/MCL (ref 2.1–9.2)
ANION GAP SERPL CALC-SCNC: 19 MMOL/L
BASOPHILS # BLD AUTO: 0 X10(3)/MCL (ref 0–0.2)
BASOPHILS NFR BLD AUTO: 0.7 %
BUN SERPL-MCNC: 8 MG/DL (ref 8–26)
CHLORIDE SERPL-SCNC: 101 MMOL/L (ref 98–109)
CREAT SERPL-MCNC: 0.5 MG/DL (ref 0.6–1.3)
EOSINOPHIL # BLD AUTO: 0.1 X10(3)/MCL (ref 0–0.9)
EOSINOPHIL NFR BLD AUTO: 1.6 %
ERYTHROCYTE [DISTWIDTH] IN BLOOD BY AUTOMATED COUNT: 15.7 % (ref 11.5–17)
FERRITIN SERPL-MCNC: 1206.96 NG/ML (ref 4.63–204)
GLUCOSE SERPL-MCNC: 134 MG/DL (ref 70–105)
HCT VFR BLD AUTO: 29.3 % (ref 37–47)
HCT VFR BLD CALC: 31 % (ref 38–51)
HGB BLD-MCNC: 10.5 MG/DL (ref 12–17)
HGB BLD-MCNC: 9.6 GM/DL (ref 12–16)
IRON SATN MFR SERPL: 42 % (ref 20–50)
IRON SERPL-MCNC: 75 UG/DL (ref 50–170)
LYMPHOCYTES # BLD AUTO: 0.9 X10(3)/MCL (ref 0.6–4.6)
LYMPHOCYTES NFR BLD AUTO: 14.2 %
MAGNESIUM SERPL-MCNC: 1.9 MG/DL (ref 1.6–2.6)
MCH RBC QN AUTO: 33.9 PG (ref 27–31)
MCHC RBC AUTO-ENTMCNC: 32.8 GM/DL (ref 33–36)
MCV RBC AUTO: 103.5 FL (ref 80–94)
MONOCYTES # BLD AUTO: 0.7 X10(3)/MCL (ref 0.1–1.3)
MONOCYTES NFR BLD AUTO: 10.7 %
NEUTROPHILS # BLD AUTO: 4.5 X10(3)/MCL (ref 2.1–9.2)
NEUTROPHILS NFR BLD AUTO: 72.6 %
PLATELET # BLD AUTO: 182 X10(3)/MCL (ref 130–400)
PMV BLD AUTO: 9 FL (ref 9.4–12.4)
POC IONIZED CALCIUM: 1.13 MMOL/L (ref 1.12–1.32)
POC TCO2: 24 MMOL/L (ref 24–29)
POTASSIUM BLD-SCNC: 3.8 MMOL/L (ref 3.5–4.9)
RBC # BLD AUTO: 2.83 X10(6)/MCL (ref 4.2–5.4)
SODIUM BLD-SCNC: 139 MMOL/L (ref 138–146)
TIBC SERPL-MCNC: 102 UG/DL (ref 70–310)
TIBC SERPL-MCNC: 177 UG/DL (ref 250–450)
TRANSFERRIN SERPL-MCNC: 160 MG/DL (ref 173–360)
WBC # SPEC AUTO: 6.1 X10(3)/MCL (ref 4.5–11.5)

## 2021-07-20 ENCOUNTER — HISTORICAL (OUTPATIENT)
Dept: INFUSION THERAPY | Facility: HOSPITAL | Age: 66
End: 2021-07-20

## 2021-08-16 ENCOUNTER — HISTORICAL (OUTPATIENT)
Dept: ADMINISTRATIVE | Facility: HOSPITAL | Age: 66
End: 2021-08-16

## 2021-08-16 LAB
ABS NEUT (OLG): 2.95 X10(3)/MCL (ref 2.1–9.2)
ANION GAP SERPL CALC-SCNC: 17 MMOL/L
BASOPHILS # BLD AUTO: 0 X10(3)/MCL (ref 0–0.2)
BASOPHILS NFR BLD AUTO: 0.5 %
BUN SERPL-MCNC: 10 MG/DL (ref 8–26)
CHLORIDE SERPL-SCNC: 105 MMOL/L (ref 98–109)
CREAT SERPL-MCNC: 0.8 MG/DL (ref 0.6–1.3)
EOSINOPHIL # BLD AUTO: 0.2 X10(3)/MCL (ref 0–0.9)
EOSINOPHIL NFR BLD AUTO: 5.8 %
ERYTHROCYTE [DISTWIDTH] IN BLOOD BY AUTOMATED COUNT: 17.3 % (ref 11.5–17)
FERRITIN SERPL-MCNC: 685.36 NG/ML (ref 4.63–204)
GLUCOSE SERPL-MCNC: 131 MG/DL (ref 70–105)
HCT VFR BLD AUTO: 34.6 % (ref 37–47)
HCT VFR BLD CALC: 34 % (ref 38–51)
HGB BLD-MCNC: 10.9 GM/DL (ref 12–16)
HGB BLD-MCNC: 11.6 MG/DL (ref 12–17)
IRON SATN MFR SERPL: 52 % (ref 20–50)
IRON SERPL-MCNC: 118 UG/DL (ref 50–170)
LYMPHOCYTES # BLD AUTO: 0.6 X10(3)/MCL (ref 0.6–4.6)
LYMPHOCYTES NFR BLD AUTO: 15.1 %
MAGNESIUM SERPL-MCNC: 1.7 MG/DL (ref 1.6–2.6)
MCH RBC QN AUTO: 34 PG (ref 27–31)
MCHC RBC AUTO-ENTMCNC: 31.5 GM/DL (ref 33–36)
MCV RBC AUTO: 107.8 FL (ref 80–94)
MONOCYTES # BLD AUTO: 0.4 X10(3)/MCL (ref 0.1–1.3)
MONOCYTES NFR BLD AUTO: 10 %
NEUTROPHILS # BLD AUTO: 3 X10(3)/MCL (ref 2.1–9.2)
NEUTROPHILS NFR BLD AUTO: 68.6 %
PLATELET # BLD AUTO: 136 X10(3)/MCL (ref 130–400)
PMV BLD AUTO: 9.9 FL (ref 9.4–12.4)
POC IONIZED CALCIUM: 1.1 MMOL/L (ref 1.12–1.32)
POC TCO2: 26 MMOL/L (ref 24–29)
POTASSIUM BLD-SCNC: 3.8 MMOL/L (ref 3.5–4.9)
RBC # BLD AUTO: 3.21 X10(6)/MCL (ref 4.2–5.4)
SODIUM BLD-SCNC: 143 MMOL/L (ref 138–146)
TIBC SERPL-MCNC: 108 UG/DL (ref 70–310)
TIBC SERPL-MCNC: 226 UG/DL (ref 250–450)
TRANSFERRIN SERPL-MCNC: 200 MG/DL (ref 173–360)
WBC # SPEC AUTO: 4.3 X10(3)/MCL (ref 4.5–11.5)

## 2021-09-20 ENCOUNTER — HISTORICAL (OUTPATIENT)
Dept: INFUSION THERAPY | Facility: HOSPITAL | Age: 66
End: 2021-09-20

## 2021-09-20 LAB
ABS NEUT (OLG): 2.17 X10(3)/MCL (ref 2.1–9.2)
ALBUMIN SERPL-MCNC: 2.6 GM/DL (ref 3.4–4.8)
ALBUMIN/GLOB SERPL: 0.9 RATIO (ref 1.1–2)
ALP SERPL-CCNC: 181 UNIT/L (ref 40–150)
ALT SERPL-CCNC: 24 UNIT/L (ref 0–55)
AST SERPL-CCNC: 58 UNIT/L (ref 5–34)
BASOPHILS # BLD AUTO: 0 X10(3)/MCL (ref 0–0.2)
BASOPHILS NFR BLD AUTO: 0.6 %
BILIRUB SERPL-MCNC: 0.5 MG/DL
BILIRUBIN DIRECT+TOT PNL SERPL-MCNC: 0.2 MG/DL (ref 0–0.8)
BILIRUBIN DIRECT+TOT PNL SERPL-MCNC: 0.3 MG/DL (ref 0–0.5)
BUN SERPL-MCNC: 10.8 MG/DL (ref 9.8–20.1)
CALCIUM SERPL-MCNC: 8.1 MG/DL (ref 8.4–10.2)
CHLORIDE SERPL-SCNC: 103 MMOL/L (ref 98–107)
CO2 SERPL-SCNC: 28 MMOL/L (ref 23–31)
CREAT SERPL-MCNC: 0.68 MG/DL (ref 0.55–1.02)
EOSINOPHIL # BLD AUTO: 0.1 X10(3)/MCL (ref 0–0.9)
EOSINOPHIL NFR BLD AUTO: 2.4 %
ERYTHROCYTE [DISTWIDTH] IN BLOOD BY AUTOMATED COUNT: 13.8 % (ref 11.5–17)
GLOBULIN SER-MCNC: 3 GM/DL (ref 2.4–3.5)
GLUCOSE SERPL-MCNC: 129 MG/DL (ref 82–115)
HCT VFR BLD AUTO: 39.4 % (ref 37–47)
HGB BLD-MCNC: 12.2 GM/DL (ref 12–16)
LYMPHOCYTES # BLD AUTO: 0.6 X10(3)/MCL (ref 0.6–4.6)
LYMPHOCYTES NFR BLD AUTO: 18.9 %
MACROCYTES BLD QL SMEAR: NORMAL
MCH RBC QN AUTO: 33.6 PG (ref 27–31)
MCHC RBC AUTO-ENTMCNC: 31 GM/DL (ref 33–36)
MCV RBC AUTO: 108.5 FL (ref 80–94)
MONOCYTES # BLD AUTO: 0.4 X10(3)/MCL (ref 0.1–1.3)
MONOCYTES NFR BLD AUTO: 11.9 %
NEUTROPHILS # BLD AUTO: 2.2 X10(3)/MCL (ref 2.1–9.2)
NEUTROPHILS NFR BLD AUTO: 66.2 %
PLATELET # BLD AUTO: 125 X10(3)/MCL (ref 130–400)
PLATELET # BLD EST: ADEQUATE 10*3/UL
PMV BLD AUTO: 9.5 FL (ref 9.4–12.4)
POLYCHROMASIA BLD QL SMEAR: SLIGHT
POTASSIUM SERPL-SCNC: 4.1 MMOL/L (ref 3.5–5.1)
PROT SERPL-MCNC: 5.6 GM/DL (ref 5.8–7.6)
RBC # BLD AUTO: 3.63 X10(6)/MCL (ref 4.2–5.4)
RBC MORPH BLD: NORMAL
SODIUM SERPL-SCNC: 143 MMOL/L (ref 136–145)
WBC # SPEC AUTO: 3.3 X10(3)/MCL (ref 4.5–11.5)

## 2021-10-25 ENCOUNTER — HISTORICAL (OUTPATIENT)
Dept: HEMATOLOGY/ONCOLOGY | Facility: CLINIC | Age: 66
End: 2021-10-25

## 2021-10-25 LAB
ABS NEUT (OLG): 0.73 X10(3)/MCL (ref 2.1–9.2)
ALBUMIN SERPL-MCNC: 3.1 GM/DL (ref 3.4–4.8)
ALBUMIN/GLOB SERPL: 1.1 RATIO (ref 1.1–2)
ALP SERPL-CCNC: 143 UNIT/L (ref 40–150)
ALT SERPL-CCNC: 22 UNIT/L (ref 0–55)
AST SERPL-CCNC: 50 UNIT/L (ref 5–34)
BASOPHILS # BLD AUTO: 0 X10(3)/MCL (ref 0–0.2)
BASOPHILS NFR BLD AUTO: 1.6 %
BILIRUB SERPL-MCNC: 0.4 MG/DL
BILIRUBIN DIRECT+TOT PNL SERPL-MCNC: 0.2 MG/DL (ref 0–0.5)
BILIRUBIN DIRECT+TOT PNL SERPL-MCNC: 0.2 MG/DL (ref 0–0.8)
BUN SERPL-MCNC: 13.2 MG/DL (ref 9.8–20.1)
CALCIUM SERPL-MCNC: 8.8 MG/DL (ref 8.7–10.5)
CHLORIDE SERPL-SCNC: 104 MMOL/L (ref 98–107)
CO2 SERPL-SCNC: 30 MMOL/L (ref 23–31)
CREAT SERPL-MCNC: 0.71 MG/DL (ref 0.55–1.02)
EOSINOPHIL # BLD AUTO: 0 X10(3)/MCL (ref 0–0.9)
EOSINOPHIL NFR BLD AUTO: 1.1 %
ERYTHROCYTE [DISTWIDTH] IN BLOOD BY AUTOMATED COUNT: 12.8 % (ref 11.5–17)
GLOBULIN SER-MCNC: 2.9 GM/DL (ref 2.4–3.5)
GLUCOSE SERPL-MCNC: 109 MG/DL (ref 82–115)
HCT VFR BLD AUTO: 29.2 % (ref 37–47)
HGB BLD-MCNC: 9.4 GM/DL (ref 12–16)
LDH SERPL-CCNC: 295 UNIT/L (ref 140–271)
LYMPHOCYTES # BLD AUTO: 0.7 X10(3)/MCL (ref 0.6–4.6)
LYMPHOCYTES NFR BLD AUTO: 39.7 %
MAGNESIUM SERPL-MCNC: 1.9 MG/DL (ref 1.6–2.6)
MCH RBC QN AUTO: 32.6 PG (ref 27–31)
MCHC RBC AUTO-ENTMCNC: 32.2 GM/DL (ref 33–36)
MCV RBC AUTO: 101.4 FL (ref 80–94)
MONOCYTES # BLD AUTO: 0.3 X10(3)/MCL (ref 0.1–1.3)
MONOCYTES NFR BLD AUTO: 17.9 %
NEUTROPHILS # BLD AUTO: 0.7 X10(3)/MCL (ref 2.1–9.2)
NEUTROPHILS NFR BLD AUTO: 39.7 %
PHOSPHATE SERPL-MCNC: 3.9 MG/DL (ref 2.3–4.7)
PLATELET # BLD AUTO: 192 X10(3)/MCL (ref 130–400)
PMV BLD AUTO: 8.9 FL (ref 9.4–12.4)
POTASSIUM SERPL-SCNC: 4.6 MMOL/L (ref 3.5–5.1)
PROT SERPL-MCNC: 6 GM/DL (ref 5.8–7.6)
RBC # BLD AUTO: 2.88 X10(6)/MCL (ref 4.2–5.4)
RET# (OHS): 0.01 X10^6/ML (ref 0.02–0.08)
RETICULOCYTE COUNT AUTOMATED (OLG): 0.2 % (ref 1.1–2.1)
SODIUM SERPL-SCNC: 142 MMOL/L (ref 136–145)
URATE SERPL-MCNC: 5.5 MG/DL (ref 2.6–6)
WBC # SPEC AUTO: 1.8 X10(3)/MCL (ref 4.5–11.5)

## 2021-11-01 ENCOUNTER — HISTORICAL (OUTPATIENT)
Dept: HEMATOLOGY/ONCOLOGY | Facility: CLINIC | Age: 66
End: 2021-11-01

## 2021-11-01 LAB
ABS NEUT (OLG): 1.36 X10(3)/MCL (ref 2.1–9.2)
BASOPHILS # BLD AUTO: 0 X10(3)/MCL (ref 0–0.2)
BASOPHILS NFR BLD AUTO: 0.7 %
EOSINOPHIL # BLD AUTO: 0 X10(3)/MCL (ref 0–0.9)
EOSINOPHIL NFR BLD AUTO: 1.5 %
ERYTHROCYTE [DISTWIDTH] IN BLOOD BY AUTOMATED COUNT: 12.6 % (ref 11.5–17)
HCT VFR BLD AUTO: 27.1 % (ref 37–47)
HGB BLD-MCNC: 8.6 GM/DL (ref 12–16)
LYMPHOCYTES # BLD AUTO: 0.8 X10(3)/MCL (ref 0.6–4.6)
LYMPHOCYTES NFR BLD AUTO: 30.7 %
MCH RBC QN AUTO: 31.9 PG (ref 27–31)
MCHC RBC AUTO-ENTMCNC: 31.7 GM/DL (ref 33–36)
MCV RBC AUTO: 100.4 FL (ref 80–94)
MONOCYTES # BLD AUTO: 0.5 X10(3)/MCL (ref 0.1–1.3)
MONOCYTES NFR BLD AUTO: 17.5 %
NEUTROPHILS # BLD AUTO: 1.4 X10(3)/MCL (ref 2.1–9.2)
NEUTROPHILS NFR BLD AUTO: 49.6 %
PLATELET # BLD AUTO: 159 X10(3)/MCL (ref 130–400)
PMV BLD AUTO: 9.1 FL (ref 9.4–12.4)
RBC # BLD AUTO: 2.7 X10(6)/MCL (ref 4.2–5.4)
RET# (OHS): 0.01 X10^6/ML (ref 0.02–0.08)
RETICULOCYTE COUNT AUTOMATED (OLG): 0.2 % (ref 1.1–2.1)
WBC # SPEC AUTO: 2.7 X10(3)/MCL (ref 4.5–11.5)

## 2021-11-18 ENCOUNTER — HISTORICAL (OUTPATIENT)
Dept: HEMATOLOGY/ONCOLOGY | Facility: CLINIC | Age: 66
End: 2021-11-18

## 2021-11-18 LAB
ABS NEUT (OLG): 3.19 X10(3)/MCL (ref 2.1–9.2)
ALBUMIN SERPL-MCNC: 3 GM/DL (ref 3.4–4.8)
ALBUMIN/GLOB SERPL: 1.1 RATIO (ref 1.1–2)
ALP SERPL-CCNC: 129 UNIT/L (ref 40–150)
ALT SERPL-CCNC: 21 UNIT/L (ref 0–55)
AST SERPL-CCNC: 60 UNIT/L (ref 5–34)
BASOPHILS # BLD AUTO: 0.1 X10(3)/MCL (ref 0–0.2)
BASOPHILS NFR BLD AUTO: 1.9 %
BILIRUB SERPL-MCNC: 0.4 MG/DL
BILIRUBIN DIRECT+TOT PNL SERPL-MCNC: 0.1 MG/DL (ref 0–0.8)
BILIRUBIN DIRECT+TOT PNL SERPL-MCNC: 0.3 MG/DL (ref 0–0.5)
BUN SERPL-MCNC: 16.1 MG/DL (ref 9.8–20.1)
CALCIUM SERPL-MCNC: 8.4 MG/DL (ref 8.7–10.5)
CHLORIDE SERPL-SCNC: 106 MMOL/L (ref 98–107)
CO2 SERPL-SCNC: 29 MMOL/L (ref 23–31)
CREAT SERPL-MCNC: 0.63 MG/DL (ref 0.55–1.02)
EOSINOPHIL # BLD AUTO: 0.5 X10(3)/MCL (ref 0–0.9)
EOSINOPHIL NFR BLD AUTO: 8.5 %
ERYTHROCYTE [DISTWIDTH] IN BLOOD BY AUTOMATED COUNT: 13.1 % (ref 11.5–17)
GLOBULIN SER-MCNC: 2.7 GM/DL (ref 2.4–3.5)
GLUCOSE SERPL-MCNC: 60 MG/DL (ref 82–115)
HCT VFR BLD AUTO: 26.7 % (ref 37–47)
HGB BLD-MCNC: 8.6 GM/DL (ref 12–16)
LDH SERPL-CCNC: 284 UNIT/L (ref 140–271)
LYMPHOCYTES # BLD AUTO: 1.1 X10(3)/MCL (ref 0.6–4.6)
LYMPHOCYTES NFR BLD AUTO: 20.6 %
MAGNESIUM SERPL-MCNC: 1.8 MG/DL (ref 1.6–2.6)
MCH RBC QN AUTO: 31 PG (ref 27–31)
MCHC RBC AUTO-ENTMCNC: 32.2 GM/DL (ref 33–36)
MCV RBC AUTO: 96.4 FL (ref 80–94)
MONOCYTES # BLD AUTO: 0.5 X10(3)/MCL (ref 0.1–1.3)
MONOCYTES NFR BLD AUTO: 9.8 %
NEUTROPHILS # BLD AUTO: 3.2 X10(3)/MCL (ref 2.1–9.2)
NEUTROPHILS NFR BLD AUTO: 59 %
PHOSPHATE SERPL-MCNC: 4 MG/DL (ref 2.3–4.7)
PLATELET # BLD AUTO: 388 X10(3)/MCL (ref 130–400)
PLATELET # BLD AUTO: 413 X10(3)/MCL (ref 130–400)
PMV BLD AUTO: 9.2 FL (ref 9.4–12.4)
POTASSIUM SERPL-SCNC: 3.9 MMOL/L (ref 3.5–5.1)
PROT SERPL-MCNC: 5.7 GM/DL (ref 5.8–7.6)
RBC # BLD AUTO: 2.77 X10(6)/MCL (ref 4.2–5.4)
RET# (OHS): 0.01 X10^6/ML (ref 0.02–0.08)
RETICULOCYTE COUNT AUTOMATED (OLG): 0.3 % (ref 1.1–2.1)
SODIUM SERPL-SCNC: 144 MMOL/L (ref 136–145)
URATE SERPL-MCNC: 5.1 MG/DL (ref 2.6–6)
WBC # SPEC AUTO: 5.4 X10(3)/MCL (ref 4.5–11.5)

## 2021-11-29 ENCOUNTER — HISTORICAL (OUTPATIENT)
Dept: INFUSION THERAPY | Facility: HOSPITAL | Age: 66
End: 2021-11-29

## 2021-11-29 LAB
ABS NEUT (OLG): 2.7 X10(3)/MCL (ref 2.1–9.2)
ALBUMIN SERPL-MCNC: 3.1 GM/DL (ref 3.4–4.8)
ALBUMIN/GLOB SERPL: 1.1 RATIO (ref 1.1–2)
ALP SERPL-CCNC: 125 UNIT/L (ref 40–150)
ALT SERPL-CCNC: 32 UNIT/L (ref 0–55)
AST SERPL-CCNC: 62 UNIT/L (ref 5–34)
BASOPHILS # BLD AUTO: 0 X10(3)/MCL (ref 0–0.2)
BASOPHILS NFR BLD AUTO: 1.1 %
BILIRUB SERPL-MCNC: 0.5 MG/DL
BILIRUB SERPL-MCNC: 0.5 MG/DL
BILIRUBIN DIRECT+TOT PNL SERPL-MCNC: 0.2 MG/DL (ref 0–0.5)
BILIRUBIN DIRECT+TOT PNL SERPL-MCNC: 0.2 MG/DL (ref 0–0.5)
BILIRUBIN DIRECT+TOT PNL SERPL-MCNC: 0.3 MG/DL (ref 0–0.8)
BILIRUBIN DIRECT+TOT PNL SERPL-MCNC: 0.3 MG/DL (ref 0–0.8)
BUN SERPL-MCNC: 15.2 MG/DL (ref 9.8–20.1)
CALCIUM SERPL-MCNC: 8.4 MG/DL (ref 8.7–10.5)
CHLORIDE SERPL-SCNC: 107 MMOL/L (ref 98–107)
CO2 SERPL-SCNC: 29 MMOL/L (ref 23–31)
CREAT SERPL-MCNC: 0.61 MG/DL (ref 0.55–1.02)
CROSSMATCH INTERPRETATION: NORMAL
EOSINOPHIL # BLD AUTO: 0.2 X10(3)/MCL (ref 0–0.9)
EOSINOPHIL NFR BLD AUTO: 5.4 %
ERYTHROCYTE [DISTWIDTH] IN BLOOD BY AUTOMATED COUNT: 13.2 % (ref 11.5–17)
GLOBULIN SER-MCNC: 2.8 GM/DL (ref 2.4–3.5)
GLUCOSE SERPL-MCNC: 86 MG/DL (ref 82–115)
GROUP & RH: NORMAL
HCT VFR BLD AUTO: 22.3 % (ref 37–47)
HGB BLD-MCNC: 7.1 GM/DL (ref 12–16)
LDH SERPL-CCNC: 272 UNIT/L (ref 140–271)
LYMPHOCYTES # BLD AUTO: 1.1 X10(3)/MCL (ref 0.6–4.6)
LYMPHOCYTES NFR BLD AUTO: 22.8 %
MAGNESIUM SERPL-MCNC: 1.7 MG/DL (ref 1.6–2.6)
MCH RBC QN AUTO: 30.1 PG (ref 27–31)
MCHC RBC AUTO-ENTMCNC: 31.8 GM/DL (ref 33–36)
MCV RBC AUTO: 94.5 FL (ref 80–94)
MONOCYTES # BLD AUTO: 0.6 X10(3)/MCL (ref 0.1–1.3)
MONOCYTES NFR BLD AUTO: 12.3 %
NEUTROPHILS # BLD AUTO: 2.7 X10(3)/MCL (ref 2.1–9.2)
NEUTROPHILS NFR BLD AUTO: 58.2 %
PHOSPHATE SERPL-MCNC: 4.6 MG/DL (ref 2.3–4.7)
PLATELET # BLD AUTO: 264 X10(3)/MCL (ref 130–400)
PLATELET # BLD AUTO: 286 X10(3)/MCL (ref 130–400)
PMV BLD AUTO: 8.9 FL (ref 9.4–12.4)
POTASSIUM SERPL-SCNC: 4 MMOL/L (ref 3.5–5.1)
PRODUCT READY: NORMAL
PROT SERPL-MCNC: 5.9 GM/DL (ref 5.8–7.6)
RBC # BLD AUTO: 2.36 X10(6)/MCL (ref 4.2–5.4)
RET# (OHS): 0 X10^6/ML (ref 0.02–0.08)
RETICULOCYTE COUNT AUTOMATED (OLG): 0.2 % (ref 1.1–2.1)
SODIUM SERPL-SCNC: 143 MMOL/L (ref 136–145)
URATE SERPL-MCNC: 4.9 MG/DL (ref 2.6–6)
WBC # SPEC AUTO: 4.6 X10(3)/MCL (ref 4.5–11.5)

## 2021-11-30 LAB
ABS NEUT (OLG): 3.41 X10(3)/MCL (ref 2.1–9.2)
BASOPHILS # BLD AUTO: 0 X10(3)/MCL (ref 0–0.2)
BASOPHILS NFR BLD AUTO: 0.4 %
EOSINOPHIL # BLD AUTO: 0.1 X10(3)/MCL (ref 0–0.9)
EOSINOPHIL NFR BLD AUTO: 3 %
ERYTHROCYTE [DISTWIDTH] IN BLOOD BY AUTOMATED COUNT: 13.2 % (ref 11.5–17)
FERRITIN SERPL-MCNC: 569.43 NG/ML (ref 4.63–204)
FOLATE SERPL-MCNC: 10.3 NG/ML (ref 7–31.4)
HCT VFR BLD AUTO: 19.3 % (ref 37–47)
HGB BLD-MCNC: 6.2 GM/DL (ref 12–16)
IRON SATN MFR SERPL: ABNORMAL % (ref 20–50)
IRON SERPL-MCNC: 230 UG/DL (ref 50–170)
LYMPHOCYTES # BLD AUTO: 0.7 X10(3)/MCL (ref 0.6–4.6)
LYMPHOCYTES NFR BLD AUTO: 14 %
MCH RBC QN AUTO: 30.2 PG (ref 27–31)
MCHC RBC AUTO-ENTMCNC: 32.1 GM/DL (ref 33–36)
MCV RBC AUTO: 94.1 FL (ref 80–94)
MONOCYTES # BLD AUTO: 0.5 X10(3)/MCL (ref 0.1–1.3)
MONOCYTES NFR BLD AUTO: 10.4 %
NEUTROPHILS # BLD AUTO: 3.4 X10(3)/MCL (ref 2.1–9.2)
NEUTROPHILS NFR BLD AUTO: 72 %
PLATELET # BLD AUTO: 197 X10(3)/MCL (ref 130–400)
PMV BLD AUTO: 9 FL (ref 9.4–12.4)
RBC # BLD AUTO: 2.05 X10(6)/MCL (ref 4.2–5.4)
TIBC SERPL-MCNC: <25 UG/DL (ref 70–310)
TIBC SERPL-MCNC: <255 UG/DL (ref 250–450)
TRANSFERRIN SERPL-MCNC: 183 MG/DL (ref 173–360)
TRANSFUSION ORDER: NORMAL
VIT B12 SERPL-MCNC: 1644 PG/ML (ref 213–816)
WBC # SPEC AUTO: 4.7 X10(3)/MCL (ref 4.5–11.5)

## 2021-12-13 ENCOUNTER — HISTORICAL (OUTPATIENT)
Dept: HEMATOLOGY/ONCOLOGY | Facility: CLINIC | Age: 66
End: 2021-12-13

## 2021-12-13 LAB
ABS NEUT (OLG): 3.4 X10(3)/MCL (ref 2.1–9.2)
ALBUMIN SERPL-MCNC: 3.2 GM/DL (ref 3.4–4.8)
ALBUMIN/GLOB SERPL: 1.1 RATIO (ref 1.1–2)
ALP SERPL-CCNC: 114 UNIT/L (ref 40–150)
ALT SERPL-CCNC: 16 UNIT/L (ref 0–55)
AST SERPL-CCNC: 33 UNIT/L (ref 5–34)
BASOPHILS # BLD AUTO: 0 X10(3)/MCL (ref 0–0.2)
BASOPHILS NFR BLD AUTO: 0.7 %
BILIRUB SERPL-MCNC: 0.6 MG/DL
BILIRUBIN DIRECT+TOT PNL SERPL-MCNC: 0.2 MG/DL (ref 0–0.8)
BILIRUBIN DIRECT+TOT PNL SERPL-MCNC: 0.4 MG/DL (ref 0–0.5)
BUN SERPL-MCNC: 13.4 MG/DL (ref 9.8–20.1)
CALCIUM SERPL-MCNC: 8.7 MG/DL (ref 8.7–10.5)
CHLORIDE SERPL-SCNC: 102 MMOL/L (ref 98–107)
CO2 SERPL-SCNC: 31 MMOL/L (ref 23–31)
CREAT SERPL-MCNC: 0.73 MG/DL (ref 0.55–1.02)
EOSINOPHIL # BLD AUTO: 0.2 X10(3)/MCL (ref 0–0.9)
EOSINOPHIL NFR BLD AUTO: 3.4 %
ERYTHROCYTE [DISTWIDTH] IN BLOOD BY AUTOMATED COUNT: 17.8 % (ref 11.5–17)
GLOBULIN SER-MCNC: 2.9 GM/DL (ref 2.4–3.5)
GLUCOSE SERPL-MCNC: 178 MG/DL (ref 82–115)
HCT VFR BLD AUTO: 29.2 % (ref 37–47)
HGB BLD-MCNC: 9.1 GM/DL (ref 12–16)
LDH SERPL-CCNC: 241 UNIT/L (ref 140–271)
LYMPHOCYTES # BLD AUTO: 0.6 X10(3)/MCL (ref 0.6–4.6)
LYMPHOCYTES NFR BLD AUTO: 13.5 %
MAGNESIUM SERPL-MCNC: 1.8 MG/DL (ref 1.6–2.6)
MCH RBC QN AUTO: 30.7 PG (ref 27–31)
MCHC RBC AUTO-ENTMCNC: 31.2 GM/DL (ref 33–36)
MCV RBC AUTO: 98.6 FL (ref 80–94)
MONOCYTES # BLD AUTO: 0.3 X10(3)/MCL (ref 0.1–1.3)
MONOCYTES NFR BLD AUTO: 5.8 %
NEUTROPHILS # BLD AUTO: 3.4 X10(3)/MCL (ref 2.1–9.2)
NEUTROPHILS NFR BLD AUTO: 76.4 %
PHOSPHATE SERPL-MCNC: 4.6 MG/DL (ref 2.3–4.7)
PLATELET # BLD AUTO: 281 X10(3)/MCL (ref 130–400)
PMV BLD AUTO: 9 FL (ref 9.4–12.4)
POTASSIUM SERPL-SCNC: 4.6 MMOL/L (ref 3.5–5.1)
PROT SERPL-MCNC: 6.1 GM/DL (ref 5.8–7.6)
RBC # BLD AUTO: 2.96 X10(6)/MCL (ref 4.2–5.4)
RET# (OHS): 0.29 X10^6/ML (ref 0.02–0.08)
RETICULOCYTE COUNT AUTOMATED (OLG): 10 % (ref 1.1–2.1)
SODIUM SERPL-SCNC: 138 MMOL/L (ref 136–145)
URATE SERPL-MCNC: 8.9 MG/DL (ref 2.6–6)
WBC # SPEC AUTO: 4.4 X10(3)/MCL (ref 4.5–11.5)

## 2022-04-10 ENCOUNTER — HISTORICAL (OUTPATIENT)
Dept: ADMINISTRATIVE | Facility: HOSPITAL | Age: 67
End: 2022-04-10
Payer: MEDICARE

## 2022-04-27 VITALS
BODY MASS INDEX: 23.04 KG/M2 | SYSTOLIC BLOOD PRESSURE: 106 MMHG | HEIGHT: 63 IN | WEIGHT: 130.06 LBS | OXYGEN SATURATION: 93 % | DIASTOLIC BLOOD PRESSURE: 73 MMHG

## 2022-04-30 NOTE — PROGRESS NOTES
Patient:   Lucy Love            MRN: [id number]            FIN: [id number]               Age:   65 years     Sex:  Female     :  1955   Associated Diagnoses:   None   Author:   Tho Kirby MD      Chief Complaint   7/15/2020 9:04 CDT       Not Doing scan at [facility]- wants to do it here. Waking up for last 2 weeks with headache.        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 2017 Tripped over cat injuring right posterior chest. CXR reveals right lung mass.   2. 10/6/2017 CT the chest with contrast 6.9 by 3.6 by 3.1 cm right middle lobe mass invading the right middle lobe bronchus, 2mm nodule left apex   3. 10/19/2017 Bronchoscopy, EBUS with 4R biopsy demonstrating squamous cell carcinoma. 4R lymph node negative.   4.10/26/2017 PET CT right middle lobe lung mass measuring 6.8cm with SUV 18.8 extending into the right hilum. Right 11th rib with a displaced fracture SUV 4.1. Soft tissue throughout left maxillary sinus   with extension into left nasal cavity  5. MRI of the Brain is negative for intracranial metastases but showed a left maxillary sinus mass extending in to the left nasal cavity.   6. 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 This needs to be removed since there is a small chance this could be harboring an invasive malignancy   after the definitive treatments for her lung cancer.   7. An EBUS done outside was inconclusive.   8. EBUS done  [facility] was Negative for N2 disease.    The final stage is T4N0M0 (Stage III) Squamous cell Carcinoma of the lung. Plan is to do induction chemotherapy followed by surgery. The patient did not qualify for the cisplatin, docetaxel and   Nintedanib secondary to the central nature of the tumor and the presence of vascular invasion.   9.  12/5/2017 - 2/6/2018 Chemotherapy/3 cycles of carboplatin and Abraxane  10. Completed definitive radiation therapy to the Chest 05/23/18.   11. 10/25/19. Newly noted bilateral pulmonary nodules Biopsy confirms squamous cell carcinoma.   12. 11/2019. The patient has NFE2L2 mutation and thus enrolled on the glutaminase inhibitor trial run by Dr. Bertrand Smith.   13. Right lung has progressed after 2 cycles of treatment with [id number] (Glutimase inhibitor).   14. PD-L1 80%. Initiated on single agent Pembrolizumab on 02/07/2020 --3/2020  - Restaging s/p c#2 showed good treatment response, how ever developed DKA requiring ICU admission and continuation of insulin.    Abraxane X 2 cycles (4/22/20)  6/2/20: Repeat CTs showed good response to treatment         Interval History   Last seen 3 weeks ago, she was given Tylenol and it seems to help.  It waxes and wanes.  She has mild change in neuropathy but does not keep her up at night.  More of the muscle aches .  Restless leg syndrome.  Been having some a.m. sinus headaches that wake her up.  Relieved with over-the-counter medications.  But with her history we discussed the pros and cons of imaging.  She has no other neurological changes      Review of Systems   Constitutional:  Fatigue, No chills.    Eye:  No blurring, No double vision.    Ear/Nose/Mouth/Throat:  No nasal congestion, No sore throat.    Respiratory:  No shortness of breath, No cough.    Cardiovascular:  No chest pain, No palpitations.    Gastrointestinal:  No nausea, No vomiting, No diarrhea, No constipation.    Genitourinary:  No dysuria, No hematuria.    Immunologic:  Chemotherapy.    Musculoskeletal:  Joint pain, Muscle pain.    Integumentary:  No rash, No pruritus.    Neurologic:  Alert and oriented X4, No abnormal balance.    Psychiatric:  Negative.       Health Status   Allergies:    Allergic Reactions (Selected)  Severity Not Documented  Iodine- Rash, swelling and throat swells.  Latex- Rash.  Norco-  Dillusions, insomnia and itching.  Phenergan- Insomnia.  Shellfish- Rash, throat closes and face swells.  Tape- Red rash and gang green.,    Allergies (6) Active Reaction  iodine throat swells  Latex Rash  Norco insomnia  Phenergan insomnia  shellfish rash  Tape red rash     Current medications:  (Selected)   Outpatient Medications  Ordered  DuoNeb 0.5 mg-2.5 mg/3 mL inhalation solution: 3 mL, NEB, Once  Versed: 1 mg, 1 mL, IV Push, Once  Future  Abraxane (for IVPB): 460 mg, 92 mL, 184 mL/hr, IV Piggyback, Once-chemo, Days 1  Aloxi (for IVPB): 0.25 mg, 5 mL, 150 mL/hr, IV Piggyback, Once-chemo, Days 1  Benadryl 50mg/ml Inj: 12.5 mg, IV Piggyback, Once-chemo, Days 1  Heparin Flush 100 U/mL - 5 mL: 500 units, 5 mL, IV Push, Once-chemo, Days 1  Zantac 50 mg/NS 50 mL IVPB: 20 mg, 50 mL, 150 mL/hr, IV Piggyback, Once-chemo, Days 1  dexamethasone (for IVPB): 6 mg, IV Piggyback, Once-chemo, Days 1  Prescriptions  Prescribed  Lantus Solostar Pen 100 units/mL subcutaneous solution: 20 units, Subcutaneous, Daily, 15 mL, 3 Refill(s)  Trulicity Pen 1.5 mg/0.5 mL subcutaneous solution: 1.5 mg, 0.5 mL, Subcutaneous, qWeek, 2.5 mL, 11 Refill(s)  Tylenol with Codeine #3 oral tablet: See Instructions, 1 tab(s) Oral q6h as needed  for 3 days following chemotherapy, 20 tab(s), 0 Refill(s)  Zofran 8 mg oral tablet: See Instructions, 1 tab(s) Oral TID prn N+V, 30 tab(s), 1 Refill(s)  magnesium oxide 500 mg oral tablet: 500 mg, 1 tab(s), Oral, Daily, discontinue mag ox 400 mg daily, 30 tab(s), 2 Refill(s)  metFORMIN 1000 mg oral tablet: 1,000 mg, 1 tab(s), Oral, BID, 180 tab(s), 1 Refill(s)  potassium chloride 10 mEq oral CAPSULE extended release: 10 mEq, 1 cap(s), Oral, Daily, 30 cap(s), 1 Refill(s)  ropinirole 0.5 mg oral tablet: See Instructions, TAKE ONE TABLET BY MOUTH 1 -3 hours before bedtime, 30 tab(s), 5 Refill(s)  traMADol 50 mg oral tablet: 50 mg, 1 tab(s), Oral, q6hr, PRN: pain, mild, 30 tab(s), 0 Refill(s)  Documented  Medications  Documented  Ambien 10 mg oral tab -[facility]: 1 tab(s), Oral, Once a day (at bedtime), PRN: as needed for insomnia, 0 Refill(s)  BuSpar 7.5 mg oral tablet: 1 tab(s), Oral, TID, 90 tab(s), 0 Refill(s)  Bystolic 10 mg oral tablet: 10 mg, 1 tab(s), Oral, Daily, 30 tab(s)  Centrum Women's oral tablet: 1 tab(s), Oral, Daily, 0 Refill(s)  Lubricant Eye Drops ophthalmic solution: 1 drop(s), Eye-Both, BID, PRN: as needed for dry eyes, 30 EA, 0 Refill(s)  NovoLIN R FlexPen 100 units/mL injectable solution: Subcutaneous, As Directed  Symbicort 160 mcg-4.5 mcg/inh inhalation aerosol: 2 puff(s), INH, BID, PRN: wheezing and sob, 0 Refill(s)  Vitamin B12 Injection -CCA: qMonth  Vitamin D3 5000 intl units (125 mcg) oral capsule: 5,000 IntUnit, 1 cap(s), Oral, Daily, with food, 100 cap(s), 0 Refill(s)  alPRAzolam 0.5 mg oral tablet: 0.5 mg, 1 tab(s), Oral, At Bedtime, PRN: for anxiety  albuterol-ipratropium 2.5 mg-0.5 mg/3 mL inhalation solution: 3 mL, NEB, q6hr  amlodipine-benazepril 10 mg-20 mg oral capsule: 1 cap(s), Oral, Daily  montelukast 10 mg oral TABLET: 10 mg, 1 tab(s), Oral, Daily, 0 Refill(s)   Problem list:    All Problems  Acid reflux / 6154055629 / Confirmed  Anemia / 171430183 / Confirmed  Asthma / 314984406 / Confirmed  Vitamin B12 deficiency / 108521292 / Confirmed  Low vitamin D level / 023208261 / Confirmed  History of gastric bypass / 9910286960 / Confirmed  Hypertension / 04114707 / Confirmed  Depression with anxiety / 987653316 / Confirmed  NIKI on CPAP / 693760044 / Confirmed  Stage IV squamous cell carcinoma of lung / 459753720 / Confirmed  Diabetes mellitus type 2 in nonobese / 8947546109 / Confirmed  Canceled: Vitamin B12 deficiency / 270473735  Canceled: Abnormal chest CT / 0219007414  Canceled: Family history of lung cancer / 5322923273  Canceled: Heart disease / 35211470  Canceled: Routine health maintenance / 030410056  Canceled: Sleep apnea / 898166325,    Active Problems (11)  Acid reflux    Anemia   Asthma   Depression with anxiety   Diabetes mellitus type 2 in nonobese   History of gastric bypass   Hypertension   Low vitamin D level   NIKI on CPAP   Stage IV squamous cell carcinoma of lung   Vitamin B12 deficiency         Histories   Past Medical History:    No active or resolved past medical history items have been selected or recorded.   Family History:    No family history items have been selected or recorded.   Procedure history:    Mammogram (005337309) on 1/1/2019 at 63 Years.  Bronchoscopy w/ Needle Aspir Biopsy(s) on 10/19/2017 at 62 Years.  Comments:  10/19/2017 13:24 Chasity Mistry RRT.  auto-populated from documented surgical case  Bronchoscopy w/ Endobronch Biopsy(s) on 10/19/2017 at 62 Years.  Comments:  10/19/2017 13:24 Chasity Mistry RRT.  auto-populated from documented surgical case  Bronchoscopy with Brushings on 10/19/2017 at 62 Years.  Comments:  10/19/2017 13:24 Chasity Mistry RRT.  auto-populated from documented surgical case  Bronchoscopy, Ebus, 2 or Less Samples on 10/19/2017 at 62 Years.  Comments:  10/19/2017 13:24 Chasity Mistry RRT C.  auto-populated from documented surgical case  gastric sleeve and bypass.  cheryl fundiplication.  bilatteral bunnionectomy.  bilatteral carpel tunnel repair.  lasix eye surgery.  partial hysterectomy.  cholecysectomy.  tonsillectomy.  hemmorhedectomy.   Social History        Social & Psychosocial Habits    Alcohol  02/06/2015 Risk Assessment: Denies Alcohol Use    10/10/2017  Use: Current    Type: Liquor    Frequency: Daily    04/06/2020  Use: Past    Type: Liquor    Frequency: 1-2 times per week    05/17/2020  Use: Past    Substance Use  02/06/2015 Risk Assessment: Denies Substance Abuse    Tobacco  02/06/2015 Risk Assessment: Denies Tobacco Use    06/24/2020  Use: Former smoker, quit more    Patient Wants Consult For Cessation Counseling No    07/15/2020  Use: Former smoker, quit more    Patient Wants Consult For  Cessation Counseling N/A    Abuse/Neglect  06/24/2020  SHX Any signs of abuse or neglect No    07/15/2020  SHX Any signs of abuse or neglect No  .        Physical Examination   Vital Signs   7/15/2020 9:04 CDT       Temperature Oral          36.8 DegC                             Temperature Oral (calculated)             98.24 DegF                             Peripheral Pulse Rate     90 bpm                             Systolic Blood Pressure   134 mmHg                             Diastolic Blood Pressure  94 mmHg  HI                             Blood Pressure Location   Left arm                             Manual Cuff BP            Yes     Measurements from flowsheet : Measurements   7/15/2020 9:04 CDT       Weight Dosing             66.8 kg                             Weight Measured           66.8 kg                             Weight Measured and Calculated in Lbs     147.27 lb                             Height/Length Dosing      161 cm                             Height/Length Measured    161 cm                             BSA Measured              1.73 m2                             Body Mass Index Measured  25.77 kg/m2     General:  Alert and oriented, No acute distress.    Eye:  Pupils are equal, round and reactive to light, Normal conjunctiva.    HENT:  Normocephalic, Oral mucosa is moist, No sinus tenderness.    Neck:  Supple, Non-tender, No lymphadenopathy.    Respiratory:  Lungs are clear to auscultation, Respirations are non-labored.    Cardiovascular:  Normal rate, Regular rhythm, No edema.    Gastrointestinal:  Soft, Non-tender, Non-distended, Normal bowel sounds.    Lymphatics:  No lymphadenopathy neck, axilla, groin.    Musculoskeletal:       Upper extremity exam: Shoulder ( Right, Pain, Range of motion ( Diminished ) ).    Integumentary:  Warm, Dry.    Neurologic:  Alert, Oriented, Cranial Nerves II-XII are grossly intact.    Cognition and Speech:  Speech clear and coherent.    Psychiatric:   Appropriate mood & affect.    ECOG Performance Scale: 1- Strenuous physical activity restricted; fully ambulatory and able to carry out light work.      Review / Management   Results review:  Lab results   7/15/2020 9:08 CDT       POC Sodium                140 mmol/L                             POC Potassium             3.7 mmol/L                             POC Chloride              100 mmol/L                             POC Ion Calcium           1.15 mmol/L                             POC Glucose               137 mg/dL  HI                             POC BUN                   15.0 mg/dL                             POC Creatinine            0.7 mg/dL                             POC AGAP                  19.0  NA                             POC Hb                    11.9 mg/dL  LOW                             POC Hct                   35.0 %  LOW                             POC TCO2                  26.0 mmol/L    7/15/2020 8:53 CDT       WBC                       5.8 x10(3)/mcL                             RBC                       3.55 x10(6)/mcL  LOW                             Hgb                       10.7 gm/dL  LOW                             Hct                       34.4 %  LOW                             Platelet                  353 x10(3)/mcL                             MCV                       96.9 fL  HI                             MCH                       30.1 pg                             MCHC                      31.1 gm/dL  LOW                             RDW                       13.3 %                             MPV                       9.4 fL                             Abs Neut                  3.47 x10(3)/mcL                             NEUT%                     60.0 %  NA                             NEUT#                     3.5 x10(3)/mcL                             LYMPH%                    22.3 %  NA                             LYMPH#                    1.3 x10(3)/mcL                              MONO%                     10.7 %  NA                             MONO#                     0.6 x10(3)/mcL                             EOS%                      5.0 %  NA                             EOS#                      0.3 x10(3)/mcL                             BASO%                     1.7 %  NA                             BASO#                     0.1 x10(3)/mcL  .       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  Stable taylor metastasis.  Stable post therapeutic changes in the right lung.   She will continue single agent Abraxane per MD Anders recs, cycle 5 today  She  was going back to [facility] on 7/22 with repeat imaging  ---she want to do her scans here              will  plan for 7/29, with repeat labs CBC, CMP, include a CT of her brain because of her headaches.--she will get disk to compare                  and bring to office    RTC in 3 weeks with repeat labs and scan report    data reviewed with -Tylenol with codeine for 3 days following chemotherapy--- refill, x1 and if this continues change to  gabapentin, and d/c Tylenol #3         and consider dose reduction of meds if stable to improved disease  We will increase dexamethasone to 6 mg (to watch her sugar  We will decrease Benadryl to 12.5      Tho Kirby MD      Other Physicians   [facility] team

## 2022-04-30 NOTE — PROGRESS NOTES
Patient:   Lucy Love            MRN: [id number]            FIN: [id number]               Age:   65 years     Sex:  Female     :  1955   Associated Diagnoses:   None   Author:   Mavis Whitney      Glucose log reviewed  - hypoglycemia noted  - 20: 259 (fasting); 393 post prandial (received chemo w/ steroids on )  - 20: 96 (fasting); 114 post prandial   - 20: 73 (fasting); 178 post prandial   - 20: 76 (fasting)  - 20: 48 (fasting); 106 post prandial   - 20: 70 (fasting)  - Continue    Metformin 1000 mg twice daily     Glipizide 5 mg daily  - Decrease    Soliqua to 21 units daily  - notify clinic w/ any glucose readings of less than 70  - [facility] log on Friday, 2020

## 2022-04-30 NOTE — PROGRESS NOTES
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 CXR reveals right lung mass.   10/6/2017 CT the chest with contrast 6.9 by 3.6 by 3.1 cm right middle lobe mass invading the right middle lobe bronchus, 2mm nodule left apex   10/19/2017 Bronchoscopy, EBUS with 4R biopsy demonstrating squamous cell carcinoma. 4R lymph node negative.   10/26/2017 PET CT right middle lobe lung mass measuring 6.8cm with SUV 18.8 extending into the right hilum. Right 11th rib with a displaced fracture SUV 4.1. Soft tissue throughout left   maxillary sinus with extension into left nasal cavity   MRI of the Brain is negative for intracranial metastases but showed a left maxillary sinus mass extending in to the left nasal cavity.    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 This needs to be removed since there is a small chance this could be harboring an invasive   malignancy after the definitive treatments for her lung cancer.    An EBUS done outside was inconclusive.    [facility] done  [facility] was Negative for N2 disease.    The final stage is T4N0M0 (Stage III) Squamous cell Carcinoma of the lung. Plan is to do induction chemotherapy followed by  surgery. The patient did not qualify for the cisplatin, docetaxel   and Nintedanib secondary to the central nature of the tumor and the presence of vascular invasion.   5/2017 - 2/6/2018 Chemotherapy/3 cycles of carboplatin and Abraxane  5/23/2018: Completed definitive radiation therapy to the Chest 05/23/18.   10/25/19. Newly noted bilateral pulmonary nodules Biopsy confirms squamous cell carcinoma.   11/2019. The patient has NFE2L2 mutation and thus enrolled on the glutaminase inhibitor trial run by Dr. Bertrand Smith.    Right lung has progressed after 2 cycles of treatment with [id number] (Glutimase inhibitor).   PD-L1 80%. Initiated on single agent Pembrolizumab on 02/07/2020 --3/2020  - Restaging s/p c#2 showed good treatment response, how ever developed DKA requiring ICU admission and continuation of insulin.    Abraxane X 2 cycles (4/22/20)--- 7/15/2020:   6/2/20: Repeat CTs showed good response to treatment  7/31/20: CT C/A/P/ brain:CT the brain no evidence of abnormality, CT of the chest abdomen and pelvis, compared to outside CT of the chest in June, and abdomen pelvis in March,   New superior left lower lobe 1 cm spiculated nodule, progressive subcarinal lymphadenopathy 3.2 x 1.4 cm compared to 2.3 x 1.2 cm, unchanged right hepatic cyst, hypodensity lesion in the   liver. Changed adrenal nodule,  4/22/20-7/15/20: Abraxane q 3 weeks  9/11/20: MRI of the brain- no evidence of cranial metastases  9/4/20:PET/CT- 2 new pulmonary nodes seen on the 7/31/2020 chest CT mildly smaller today do not have increased FDG activity, although the small size does limit PET sensitivity, probe to borderline enlarged anterior paratracheal and gastropathic lymph nodes are mild hypermetabolism.  Nonspecific.    9/8/20: Gemzar 1000 mg/m² day 1, day 8 q 3 weeks    10/14/2020: CT-:Right middle lobe changes expected postsurgical scarring and radiation changes stable, pulmonary parenchymal metastatic disease likely improved compared to 9/  4/2020 within limitations of comparison but clearly improved from 7/29/2020 minimal ground-glass opacities represent sequelae of resolving metastases infection or inflammatory process, other stable findings      Interval History   Patient presents today with her  for treatment visit. She is due for Cycle 8 of Gemzar. She is doing well and tolerating treatment. She was recently seen for endocrinologist for her uncontrolled diabetes. She has mild nausea which is controlled. She doesnt routinely vomit. Her labs are stable.   She has some chronic lower ankle swelling on the right side. She had ultrasound with no evidence of DVT. She denies pain or trauma. She stays on her feet alot.          Review of Systems   Constitutional:  No chills, No sweats, No weakness, No fatigue, No decreased activity.    Eye:  No recent visual problem, No blurring, No double vision.    Ear/Nose/Mouth/Throat:  No nasal congestion, No sore throat.    Respiratory:  Cough, Sputum production, No shortness of breath, No hemoptysis, No wheezing.    Cardiovascular:  No chest pain, No palpitations, No bradycardia, No peripheral edema.    Gastrointestinal:  Heartburn, No vomiting, No diarrhea, No constipation.    Genitourinary:  No dysuria, No hematuria.    Hematology/Lymphatics:  Bruising tendency.    Immunologic:  Chemotherapy.    Musculoskeletal:  No joint pain, No muscle pain.    Integumentary:  No rash, No pruritus.    Neurologic:  Alert and oriented X4, Numbness, Tingling, No abnormal balance, No confusion, No headache.    Psychiatric:  Negative.       Health Status   Allergies:    Allergic Reactions (Selected)  Severity Not Documented  Iodine- Rash, swelling and throat swells.  Latex- Rash.  Norco- Dillusions, insomnia and itching.  Phenergan- Insomnia.  Shellfish- Rash, throat closes and face swells.  Tape- Red rash and gang green.,    Allergies (6) Active Reaction  iodine throat  swells  Latex Rash  Norco insomnia  Phenergan insomnia  shellfish rash  Tape red rash     Current medications:  (Selected)   Outpatient Medications  Ordered  DuoNeb 0.5 mg-2.5 mg/3 mL inhalation solution: 3 mL, form: Soln, NEB, Once, first dose 10/19/17 8:38:00 CDT, stop date 10/19/17 8:38:00 CDT  Heparin Flush 100 U/mL - 5 mL: 500 units, form: Injection, IV Push, Once-chemo, first dose 07/15/20 10:47:00 CDT, stop date 07/15/20 10:47:00 CDT, Routine, Days 1  Versed: 1 mg, form: Injection, IV Push, Once, first dose 10/19/17 8:00:00 CDT, stop date 10/19/17 8:00:00 CDT, 1 to 2 mg initial then 1 mg every 2 min until sedation level 2 achieved, 24  Prescriptions  Prescribed  Lantus Solostar Pen 100 units/mL subcutaneous solution: 12 units, Subcutaneous, Daily, # 15 mL, 3 Refill(s), Pharmacy: [facility], 161, cm, Height/Length Dosing, 05/20/20 14:05:00 CDT, 70.3, kg, Weight Dosing, 05/20/20 14:05:00 CDT  Nb Magnesium 500mg Tab 200: Nb Magnesium 500mg Tab 200, See Instructions, take 1 tablet by mouth once a day. stop taking the 400mg tablets, # 30 EA, 5 Refill(s), Pharmacy: [facility], 161, cm, Height/Length Dosing, 07/15/20 10:49:00 CDT, 66.8, kg, Weight Dosing, 07/1...  Trulicity Pen 1.5 mg/0.5 mL subcutaneous solution: 1.5 mg = 0.5 mL, Subcutaneous, qWeek, # 2.5 mL, 11 Refill(s), Pharmacy: [facility], 158, cm, Height/Length Dosing, 04/22/20 15:18:00 CDT, 75.3, kg, Weight Dosing, 04/22/20 15:18:00 CDT  Vitamin D3 1000 intl units oral tablet: 1,000 IntUnit = 1 tab(s), Oral, Daily, # 60 tab(s), 0 Refill(s), other reason (Rx)  Zofran 8 mg oral tablet: See Instructions, 1 tab(s) Oral TID prn N+V, # 30 tab(s), 0 Refill(s), Pharmacy: [facility], 161, cm, Height/Length Dosing, 08/12/20 14:50:00 CDT, 65, kg, Weight Dosing, 08/12/20 14:50:00 CDT  Zofran 8 mg oral tablet: See Instructions, 1 tab(s) Oral TID prn N+V, # 30 tab(s), 1 Refill(s), Pharmacy: [facility], 161, cm,  Height/Length Dosing, 01/13/21 13:06:00 CST, 59.4, kg, Weight Dosing, 01/13/21 13:06:00 CST  dronabinol 2.5 mg oral capsule: 2.5 mg = 1 cap(s), Oral, Once a day (at bedtime), # 30 cap(s), 2 Refill(s), Pharmacy: [facility], 161, cm, Height/Length Dosing, 11/19/20 15:21:00 CST, 59, kg, Weight Dosing, 11/19/20 15:21:00 CST  ferrous gluconate 324 mg oral tablet: 324 mg = 1 tab(s), Oral, Daily, # 100 tab(s), 1 Refill(s), Pharmacy: [facility], 161, cm, Height/Length Dosing, 11/19/20 15:21:00 CST, 59, kg, Weight Dosing, 11/19/20 15:21:00 CST  magnesium oxide base 500 mg oral tablet: 500 mg = 1 tab(s), Oral, BID, # 60 tab(s), 0 Refill(s), Pharmacy: [facility], 161, cm, Height/Length Dosing, 12/16/20 11:42:00 CST, 60, kg, Weight Dosing, 12/16/20 11:42:00 CST  metFORMIN 1000 mg oral tablet: 1,000 mg = 1 tab(s), Oral, BID, # 180 tab(s), 1 Refill(s), Pharmacy: [facility], 161, cm, Height/Length Dosing, 01/27/21 10:17:00 CST, 59.4, kg, Weight Dosing, 01/27/21 10:17:00 CST  mirtazapine 30 mg oral tablet: 30 mg = 1 tab(s), Oral, Once a day (at bedtime), # 90 tab(s), 1 Refill(s), Pharmacy: [facility], 161, cm, Height/Length Dosing, 11/19/20 15:21:00 CST, 59, kg, Weight Dosing, 11/19/20 15:21:00 CST  ropinirole 0.5 mg oral tablet: See Instructions, TAKE ONE TABLET BY MOUTH 1 to 3 hours before bedtime, # 30 tab(s), 5 Refill(s), Pharmacy: [facility], 161, cm, Height/Length Dosing, 11/24/20 9:08:00 CST, 59, kg, Weight Dosing, 11/24/20 9:08:00 CST  traMADol 50 mg oral tablet: 50 mg = 1 tab(s), Oral, q6hr, PRN PRN pain, mild, # 30 tab(s), 0 Refill(s), Pharmacy: [facility], 161, cm, Height/Length Dosing, 11/02/20 11:21:00 CST, 62.6, kg, Weight Dosing, 11/02/20 11:21:00 CST  Documented Medications  Documented  Aciphex 20 mg oral EC tablet (pt. own): 20 mg = 1 tab(s), Oral, Daily, 0 Refill(s)  Baqsimi Two Pack 3 mg nasal powder: 3 mg, Nasal, As  Directed  BuSpar 7.5 mg oral tablet: = 1 tab(s), Oral, TID, # 90 tab(s), 0 Refill(s)  Bystolic 10 mg oral tablet: 10 mg = 1 tab(s), Oral, Daily, # 30 tab(s), 0 Refill(s)  Centrum Women's oral tablet: 1 tab(s), Oral, Daily, 0 Refill(s)  Cequa 0.09% ophthalmic solution: 1 drop(s), OPTH, BID  Lubricant Eye Drops ophthalmic solution: 1 drop(s), Eye-Both, BID, PRN PRN as needed for dry eyes, # 30 EA, 0 Refill(s)  Lyumjev KwikPen 100 units/mL injectable solution: See Instructions, sliding scale, 0 Refill(s)  Remeron 15 mg oral tablet: 15 mg = 1 tab(s), Oral, qPM  Symbicort 160 mcg-4.5 mcg/inh inhalation aerosol: 2 puff(s), INH, BID, PRN PRN wheezing and sob, 0 Refill(s)  Trintellix 20 mg oral tablet: 20 mg = 1 tab(s), Oral, Daily  Vitamin B12 Injection -CCA: qMonth, 0 Refill(s)  alPRAzolam 0.5 mg oral tablet: 0.5 mg = 1 tab(s), Oral, At Bedtime, PRN for anxiety, 0 Refill(s)  albuterol-ipratropium 2.5 mg-0.5 mg/3 mL inhalation solution: 3 mL, NEB, q6hr, PRN PRN sob or wheezing  amlodipine-benazepril 10 mg-20 mg oral capsule: 1 cap(s), Oral, Daily, 0 Refill(s)  cyproheptadine 4 mg oral tablet: 4 mg = 1 tab(s), Oral, TID  meloxicam 15 mg oral tablet: 15 mg = 1 tab(s), Oral, Daily, 0 Refill(s)  mirtazapine 15 mg oral tablet: 15 mg = 1 tab(s), Oral, qPM  montelukast 10 mg oral TABLET: 10 mg = 1 tab(s), Oral, Daily, 0 Refill(s)  potassium chloride 20 mEq oral TABLET extended release: 20 mEq = 1 tab(s), Oral, Daily  temazepam 15 mg oral capsule: 15 mg = 1 cap(s), Oral, qPM  temazepam 30 mg oral capsule: 30 mg = 1 cap(s), Oral, qPM   Problem list:    All Problems  Acid reflux / 8570445827 / Confirmed  Anemia / 720893330 / Confirmed  Asthma / 732326082 / Confirmed  Vitamin B12 deficiency / 542660007 / Confirmed  Low vitamin D level / 049789929 / Confirmed  Diabetes type 2 on insulin / 1558551737 / Confirmed  History of gastric bypass / 1069833069 / Confirmed  Hypertension / 13653931 / Confirmed  Depression with anxiety / 837587468  / Confirmed  NIKI on CPAP / 085979287 / Confirmed  Stage IV squamous cell carcinoma of lung / 130840573 / Confirmed  Chemotherapy management, encounter for / 243596524 / Confirmed  Type 1 diabetes mellitus / 312205835 / Confirmed  Diabetes mellitus type 2 in nonobese / 3017075254 / Confirmed  Canceled: Vitamin B12 deficiency / 565727465  Canceled: Abnormal chest CT / 6373476027  Canceled: Family history of lung cancer / 7727463252  Canceled: Heart disease / 10586811  Canceled: Routine health maintenance / 320566682  Canceled: Sleep apnea / 991626871,    Active Problems (14)  Acid reflux   Anemia   Asthma   Chemotherapy management, encounter for   Depression with anxiety   Diabetes mellitus type 2 in nonobese   Diabetes type 2 on insulin   History of gastric bypass   Hypertension   Low vitamin D level   NIKI on CPAP   Stage IV squamous cell carcinoma of lung   Type 1 diabetes mellitus   Vitamin B12 deficiency         Histories   Past Medical History:    Active  Diabetes type 2 on insulin ([id number])   Family History:    No family history items have been selected or recorded.   Procedure history:    Mammogram ([id number]) on 12/1/2020 at 65 Years.  Comments:  12/16/2020 10:05 ARNALDO - Omaira Patel  [facility] on 11/2/2020 at 65 Years.  Mammogram ([id number]) on 1/1/2019 at 63 Years.  Bronchoscopy w/ Needle Aspir Biopsy(s) on 10/19/2017 at 62 Years.  Comments:  10/19/2017 13:24 Chasity Mistry RRT  auto-populated from documented surgical case  Bronchoscopy w/ Endobronch Biopsy(s) on 10/19/2017 at 62 Years.  Comments:  10/19/2017 13:24 Chasity Mistry RRT  auto-populated from documented surgical case  Bronchoscopy with Brushings on 10/19/2017 at 62 Years.  Comments:  10/19/2017 13:24 Chasity Mistry RRT  auto-populated from documented surgical case  Bronchoscopy, Ebus, 2 or Less Samples on 10/19/2017 at 62 Years.  Comments:  10/19/2017 13:24 LAZARO Barrios RRT  Chasity C.  auto-populated from documented surgical case  gastric sleeve and bypass.  cheryl fundiplication.  bilatteral bunnionectomy.  bilatteral carpel tunnel repair.  lasix eye surgery.  partial hysterectomy.  cholecysectomy.  tonsillectomy.  hemmorhedectomy.  dexcom 6 glucose monitoring.   Social History        Social & Psychosocial Habits    Alcohol  02/06/2015 Risk Assessment: Denies Alcohol Use    10/23/2020  Use: Current    Type: Liquor    Frequency: 1-2 times per week    Substance Use  02/06/2015 Risk Assessment: Denies Substance Abuse    12/16/2020  Use: Never    Tobacco  02/06/2015 Risk Assessment: Denies Tobacco Use    02/17/2021  Use: Former smoker, quit more    Patient Wants Consult For Cessation Counseling N/A    Comment: quit 30+ years ago. - 01/13/2021 13:10 - Brad GIORDANO, Shannan CARRIZALES    Abuse/Neglect  02/17/2021  SHX Any signs of abuse or neglect No    Spiritual/Cultural  07/15/2020  Caodaism Preference Holiness      07/15/2020  Branch of  Never in   .        Physical Examination   Vital Signs   2/17/2021 11:02 CST      Temperature Temporal Artery               36.1 DegC  LOW                             Peripheral Pulse Rate     72 bpm                             SpO2                      97 %                             Oxygen Therapy            Room air                             Systolic Blood Pressure   125 mmHg                             Diastolic Blood Pressure  93 mmHg  HI                             Blood Pressure Location   Right arm                             Manual Cuff BP            No     Measurements from flowsheet : Measurements   2/17/2021 11:02 CST      Weight Measured           59.8 kg                             Height/Length Measured    161 cm                             BSA Measured              1.64 m2                             Body Mass Index Measured  23.07 kg/m2     General:  Alert and oriented, No acute distress.    Eye:  Pupils are equal, round  and reactive to light, Extraocular movements are intact, Normal conjunctiva.    HENT:  Normocephalic, Tympanic membranes are clear, Normal hearing, Oral mucosa is moist, No pharyngeal erythema.    Neck:  Supple, Non-tender, No jugular venous distention, No lymphadenopathy.    Respiratory:  Lungs are clear to auscultation, Respirations are non-labored, Symmetrical chest wall expansion.    Cardiovascular:  Normal rate, Regular rhythm, No gallop, No edema.    Gastrointestinal:  Soft, Non-tender, Non-distended, Normal bowel sounds.    Lymphatics:  No lymphadenopathy neck, axilla, groin.    Musculoskeletal:  Normal range of motion.    Integumentary:  Warm, Intact, No pallor.    Neurologic:  Alert, Oriented, No focal deficits, Cranial Nerves II-XII are grossly intact, Normal deep tendon reflexes.    Cognition and Speech:  Oriented, Speech clear and coherent, Functional cognition intact.    Psychiatric:  Cooperative, Appropriate mood & affect, Normal judgment.    ECOG Performance Scale: 0 - Fully active; no performance restrictions.      Review / Management   Results review:  Lab results   2/17/2021 10:13 CST      POC Sodium                137 mmol/L  LOW                             POC Potassium             4.2 mmol/L                             POC Chloride              99 mmol/L                             POC Ion Calcium           1.10 mmol/L  LOW                             POC Glucose               204 mg/dL  HI                             POC BUN                   11.0 mg/dL                             POC Creatinine            0.6 mg/dL                             POC AGAP                  12.0  NA                             POC Hb                    11.2 mg/dL  LOW                             POC Hct                   33.0 %  LOW                             POC TCO2                  31.0 mmol/L  HI    2/17/2021 9:52 CST       WBC                       4.8 x10(3)/mcL                             RBC                        2.91 x10(6)/mcL  LOW                             Hgb                       10.5 gm/dL  LOW                             Hct                       32.3 %  LOW                             Platelet                  271 x10(3)/mcL                             MCV                       111.0 fL  HI                             MCH                       36.1 pg  HI                             MCHC                      32.5 gm/dL  LOW                             RDW                       17.0 %                             MPV                       9.1 fL  LOW                             Abs Neut                  3.08 x10(3)/mcL                             NEUT%                     64.1 %  NA                             NEUT#                     3.1 x10(3)/mcL                             LYMPH%                    19.2 %  NA                             LYMPH#                    0.9 x10(3)/mcL                             MONO%                     11.7 %  NA                             MONO#                     0.6 x10(3)/mcL                             EOS%                      3.8 %  NA                             EOS#                      0.2 x10(3)/mcL                             BASO%                     0.8 %  NA                             BASO#                     0.0 x10(3)/mcL  .    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  DKA new onset diabetes secondary to immunotherapy  osteopenia  Chemotherapy encounter  Pancytopenia due to chemotherapy      Repeat imaging at [facility] on 7/28/20 showed a new superior left lower lobe 1 cm spiculated nodule, progressive subcarinal lymphadenopathy 3.2 x 1.4 cm  9/8/20: Started single agent Gemzar, Day 1 Day 8 q 3 weeks, tolerating well  Follow up with [facility] on 12/9/20 for restaging--stable disease      PLAN:  Continue gemcitabine, Cycle 8 today  RTC in 3 weeks with repeat labs and TD  visit following MD Mcnamara visit  Has repeat follow-up with MD Mcnamara in 3/2021 after cycle 8     Other Physicians   [facility] team

## 2022-04-30 NOTE — PROGRESS NOTES
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 CXR reveals right lung mass.   10/6/2017 CT the chest with contrast 6.9 by 3.6 by 3.1 cm right middle lobe mass invading the right middle lobe bronchus, 2mm nodule left apex   10/19/2017 Bronchoscopy, EBUS with 4R biopsy demonstrating squamous cell carcinoma. 4R lymph node negative.   10/26/2017 PET CT right middle lobe lung mass measuring 6.8cm with SUV 18.8 extending into the right hilum. Right 11th rib with a displaced fracture SUV 4.1. Soft tissue throughout left   maxillary sinus with extension into left nasal cavity   MRI of the Brain is negative for intracranial metastases but showed a left maxillary sinus mass extending in to the left nasal cavity.    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 This needs to be removed since there is a small chance this could be harboring an invasive   malignancy after the definitive treatments for her lung cancer.    An EBUS done outside was inconclusive.    EBUS done  [facility] ([place]) was Negative for N2 disease.    The final stage is T4N0M0 (Stage III) Squamous cell Carcinoma of the lung. Plan is to do induction chemotherapy followed by surgery. The patient did not qualify for the cisplatin, docetaxel   and Nintedanib secondary to the central nature of the tumor and the presence of vascular invasion.   2017 - 2018 Chemotherapy/3 cycles of carboplatin and  Abraxane  5/23/2018: Completed definitive radiation therapy to the Chest 05/23/18.   10/25/19. Newly noted bilateral pulmonary nodules Biopsy confirms squamous cell carcinoma.   11/2019. The patient has NFE2L2 mutation and thus enrolled on the glutaminase inhibitor trial run by Dr. Bertrand Smith.    Right lung has progressed after 2 cycles of treatment with [id number] (Glutimase inhibitor).   PD-L1 80%. Initiated on single agent Pembrolizumab on 02/07/2020 --3/2020  - Restaging s/p c#2 showed good treatment response, how ever developed DKA requiring ICU admission and continuation of insulin.    Abraxane X 2 cycles (4/22/20)--- 7/15/2020:   6/2/20: Repeat CTs showed good response to treatment  7/31/20: CT C/A/P/ brain:CT the brain no evidence of abnormality, CT of the chest abdomen and pelvis, compared to outside CT of the chest in June, and abdomen pelvis in March,   New superior left lower lobe 1 cm spiculated nodule, progressive subcarinal lymphadenopathy 3.2 x 1.4 cm compared to 2.3 x 1.2 cm, unchanged right hepatic cyst, hypodensity lesion in the   liver. Changed adrenal nodule,  4/22/20-7/15/20: Abraxane q 3 weeks  9/11/20: MRI of the brain- no evidence of cranial metastases  9/4/20:PET/CT- 2 new pulmonary nodes seen on the 7/31/2020 chest CT mildly smaller today do not have increased FDG activity, although the small size does limit PET sensitivity, probe to   borderline enlarged anterior paratracheal and gastropathic lymph nodes are mild hypermetabolism.  Nonspecific.  9/8/20: Gemzar 1000 mg/m² day 1, day 8 q 3 weeks    10/14/2020: CT-:Right middle lobe changes expected postsurgical scarring and radiation changes stable, pulmonary parenchymal metastatic disease likely improved compared to 9/ 4/2020 within limitations of comparison but clearly improved from 7/29/2020 minimal ground-glass opacities represent sequelae of resolving metastases infection or inflammatory process, other stable findings      Interval History    Patient presents today with her  for treatment visit. She is due for Cycle 5 of Gemzar. She is doing well and tolerating treatment.   12/16 2020.  Patient was last seen by us in November 16.  Since that time she has been to [facility].  She had a bone mineral density with a T score of -1.7 by her primary care.  On December 10, 2020.  She was seen back and evaluated by [facility] Mc[facility].  Her note reviewed from that visit she was CT scan chest abdomen pelvis showed stable residual lung metastases.  The left lower lobe focal airspace opacity is slightly progressed.  She still has mild anorexia.  She is been taking her above medications.  She was trying some over-the-counter zinc as well.      Review of Systems   Constitutional:  No chills, No sweats, No weakness, No fatigue, No decreased activity.    Eye:  No recent visual problem, No blurring, No double vision.    Ear/Nose/Mouth/Throat:  No nasal congestion, No sore throat.    Respiratory:  Cough, Sputum production, No shortness of breath, No hemoptysis, No wheezing.    Cardiovascular:  No chest pain, No palpitations, No bradycardia, No peripheral edema.    Gastrointestinal:  Heartburn, No vomiting, No diarrhea, No constipation.    Genitourinary:  No dysuria, No hematuria.    Hematology/Lymphatics:  Bruising tendency.    Immunologic:  Chemotherapy.    Musculoskeletal:  No joint pain, No muscle pain.    Integumentary:  No rash, No pruritus.    Neurologic:  Alert and oriented X4, Numbness, Tingling, No abnormal balance, No confusion, No headache.    Psychiatric:  Negative.       Health Status   Allergies:    Allergic Reactions (Selected)  Severity Not Documented  Iodine- Rash, swelling and throat swells.  Latex- Rash.  Norco- Dillusions, insomnia and itching.  Phenergan- Insomnia.  Shellfish- Rash, throat closes and face swells.  Tape- Red rash and gang green.,    Allergies (6) Active Reaction  iodine throat  swells  Latex Rash  Norco insomnia  Phenergan insomnia  shellfish rash  Tape red rash     Current medications:  (Selected)   Outpatient Medications  Ordered  DuoNeb 0.5 mg-2.5 mg/3 mL inhalation solution: 3 mL, NEB, Once  Heparin Flush 100 U/mL - 5 mL: 500 units, 5 mL, IV Push, Once-chemo, Days 1  Versed: 1 mg, 1 mL, IV Push, Once  Future  Aloxi (for IVPB): 0.25 mg, 5 mL, 150 mL/hr, IV Piggyback, Once-chemo, Days 1  Aloxi (for IVPB): 0.25 mg, 5 mL, 150 mL/hr, IV Piggyback, Once-chemo, Days 1  Aloxi (for IVPB): 0.25 mg, 5 mL, 150 mL/hr, IV Piggyback, Once-chemo, Days 8  Aloxi (for IVPB): 0.25 mg, 5 mL, 150 mL/hr, IV Piggyback, Once-chemo, Days 8  Gemzar (for IVPB): 1,634 mg, 586 mL/hr, IV Piggyback, Once-chemo, Days 1  Gemzar (for IVPB): 1,634 mg, 586 mL/hr, IV Piggyback, Once-chemo, Days 8  Gemzar (for IVPB): 1,672 mg, 43.97 mL, 500 mL/hr, IV Piggyback, Once-chemo, Days 1  Gemzar (for IVPB): 1,672 mg, 43.97 mL, 500 mL/hr, IV Piggyback, Once-chemo, Days 8  Heparin Flush 100 U/mL - 5 mL: 500 units, IV Push, Once-chemo, Days 1  Heparin Flush 100 U/mL - 5 mL: 500 units, IV Push, Once-chemo, Days 1  Heparin Flush 100 U/mL - 5 mL: 500 units, IV Push, Once-chemo, Days 8  Heparin Flush 100 U/mL - 5 mL: 500 units, IV Push, Once-chemo, Days 8  dexamethasone (for IVPB): 6 mg, 0.6 mL, 151.8 mL/hr, IV Piggyback, Once-chemo, Days 1  dexamethasone (for IVPB): 6 mg, 0.6 mL, 151.8 mL/hr, IV Piggyback, Once-chemo, Days 1  dexamethasone (for IVPB): 6 mg, 0.6 mL, 151.8 mL/hr, IV Piggyback, Once-chemo, Days 8  dexamethasone (for IVPB): 6 mg, 0.6 mL, 151.8 mL/hr, IV Piggyback, Once-chemo, Days 8  Prescriptions  Prescribed  Lantus Solostar Pen 100 units/mL subcutaneous solution: 12 units, Subcutaneous, Daily, 15 mL, 3 Refill(s)  Nb Magnesium 500mg Tab 200: See Instructions, take 1 tablet by mouth once a day. stop taking the 400mg tablets, 30 EA, 5 Refill(s)  Trulicity Pen 1.5 mg/0.5 mL subcutaneous solution: 1.5 mg, 0.5 mL, Subcutaneous,  qWeek, 2.5 mL, 11 Refill(s)  Vitamin D3 1000 intl units oral tablet: 1,000 IntUnit, 1 tab(s), Oral, Daily, 60 tab(s), 0 Refill(s)  Zofran 8 mg oral tablet: See Instructions, 1 tab(s) Oral TID prn N+V, 30 tab(s), 0 Refill(s)  cyproheptadine 4 mg oral tablet: 4 mg, 1 tab(s), Oral, TID, for 30 day(s), 90 tab(s), 0 Refill(s)  dronabinol 2.5 mg oral capsule: 2.5 mg, 1 cap(s), Oral, Once a day (at bedtime), 30 cap(s), 2 Refill(s)  ferrous gluconate 324 mg oral tablet: 324 mg, 1 tab(s), Oral, Daily, 100 tab(s), 1 Refill(s)  metFORMIN 1000 mg oral tablet: 1,000 mg, 1 tab(s), Oral, BID, 180 tab(s), 1 Refill(s)  mirtazapine 30 mg oral tablet: 30 mg, 1 tab(s), Oral, Once a day (at bedtime), 90 tab(s), 1 Refill(s)  ropinirole 0.5 mg oral tablet: See Instructions, TAKE ONE TABLET BY MOUTH 1 to 3 hours before bedtime, 30 tab(s), 5 Refill(s)  traMADol 50 mg oral tablet: 50 mg, 1 tab(s), Oral, q6hr, PRN: pain, mild, 30 tab(s), 0 Refill(s)  Documented Medications  Documented  Aciphex 20 mg oral EC tablet (pt. own): 20 mg, 1 tab(s), Oral, Daily, 0 Refill(s)  BuSpar 7.5 mg oral tablet: 1 tab(s), Oral, TID, 90 tab(s), 0 Refill(s)  Bystolic 10 mg oral tablet: 10 mg, 1 tab(s), Oral, Daily, 30 tab(s)  Centrum Women's oral tablet: 1 tab(s), Oral, Daily, 0 Refill(s)  Cequa 0.09% ophthalmic solution: 1 drop(s), OPTH, BID  Fluad Quadrivalent PF [id number] intramuscular suspension: 0.5 mL, IM, As Directed  Lubricant Eye Drops ophthalmic solution: 1 drop(s), Eye-Both, BID, PRN: as needed for dry eyes, 30 EA, 0 Refill(s)  NovoLIN R FlexPen 100 units/mL injectable solution: Subcutaneous, As Directed  Remeron 15 mg oral tablet: 15 mg, 1 tab(s), Oral, qPM  Symbicort 160 mcg-4.5 mcg/inh inhalation aerosol: 2 puff(s), INH, BID, PRN: wheezing and sob, 0 Refill(s)  Trintellix 20 mg oral tablet: 20 mg, 1 tab(s), Oral, Daily  Vitamin B12 Injection -CCA: qMonth  alPRAzolam 0.5 mg oral tablet: 0.5 mg, 1 tab(s), Oral, At Bedtime, PRN: for  anxiety  albuterol-ipratropium 2.5 mg-0.5 mg/3 mL inhalation solution: 3 mL, NEB, q6hr, PRN: sob or wheezing  amlodipine-benazepril 10 mg-20 mg oral capsule: 1 cap(s), Oral, Daily  magnesium oxide base 500 mg oral tablet: 500 mg, 1 tab(s), Oral, BID  meloxicam 15 mg oral tablet: 15 mg, 1 tab(s), Oral, Daily, 0 Refill(s)  montelukast 10 mg oral TABLET: 10 mg, 1 tab(s), Oral, Daily, 0 Refill(s)  potassium chloride 20 mEq oral TABLET extended release: 20 mEq, 1 tab(s), Oral, Daily  temazepam 15 mg oral capsule: 15 mg, 1 cap(s), Oral, qPM   Problem list:    All Problems  Acid reflux / 9128036669 / Confirmed  Anemia / 409285834 / Confirmed  Asthma / 795004641 / Confirmed  Vitamin B12 deficiency / 183862252 / Confirmed  Low vitamin D level / 996413580 / Confirmed  History of gastric bypass / 7647547025 / Confirmed  Hypertension / 71825089 / Confirmed  Depression with anxiety / 981281208 / Confirmed  NIKI on CPAP / 325794511 / Confirmed  Stage IV squamous cell carcinoma of lung / 596870283 / Confirmed  Chemotherapy management, encounter for / 779184914 / Confirmed  Diabetes mellitus type 2 in nonobese / 4446193477 / Confirmed  Canceled: Vitamin B12 deficiency / 305224535  Canceled: Abnormal chest CT / 2704621258  Canceled: Family history of lung cancer / 8966485048  Canceled: Heart disease / 74769375  Canceled: Routine health maintenance / 924534425  Canceled: Sleep apnea / 314523907,    Active Problems (12)  Acid reflux   Anemia   Asthma   Chemotherapy management, encounter for   Depression with anxiety   Diabetes mellitus type 2 in nonobese   History of gastric bypass   Hypertension   Low vitamin D level   NIKI on CPAP   Stage IV squamous cell carcinoma of lung   Vitamin B12 deficiency         Histories   Past Medical History:    No active or resolved past medical history items have been selected or recorded.   Family History:    No family history items have been selected or recorded.   Procedure history:    Mammogram  ([id number]) on 12/1/2020 at 65 Years.  Comments:  12/16/2020 10:05 CST - Omaira Patel  [facility]  Port Insertion on 11/2/2020 at 65 Years.  Mammogram ([id number]) on 1/1/2019 at 63 Years.  Bronchoscopy w/ Needle Aspir Biopsy(s) on 10/19/2017 at 62 Years.  Comments:  10/19/2017 13:24 Chasity Mistry RRT C.  auto-populated from documented surgical case  Bronchoscopy w/ Endobronch Biopsy(s) on 10/19/2017 at 62 Years.  Comments:  10/19/2017 13:24 Leandro Mistry RRTyx C.  auto-populated from documented surgical case  Bronchoscopy with Brushings on 10/19/2017 at 62 Years.  Comments:  10/19/2017 13:24 LAZARO Barrios RRT Chasity C.  auto-populated from documented surgical case  Bronchoscopy, Ebus, 2 or Less Samples on 10/19/2017 at 62 Years.  Comments:  10/19/2017 13:24 LAZARO Barrios RRT Chasity C.  auto-populated from documented surgical case  gastric sleeve and bypass.  cheryl fundiplication.  bilatteral bunnionectomy.  bilatteral carpel tunnel repair.  lasix eye surgery.  partial hysterectomy.  cholecysectomy.  tonsillectomy.  hemmorhedectomy.   Social History        Social & Psychosocial Habits    Alcohol  02/06/2015 Risk Assessment: Denies Alcohol Use    10/23/2020  Use: Current    Type: Liquor    Frequency: 1-2 times per week    Substance Use  02/06/2015 Risk Assessment: Denies Substance Abuse    12/16/2020  Use: Never    Tobacco  02/06/2015 Risk Assessment: Denies Tobacco Use    12/16/2020  Use: Former smoker, quit more    Patient Wants Consult For Cessation Counseling N/A    Abuse/Neglect  12/16/2020  SHX Any signs of abuse or neglect No    Spiritual/Cultural  07/15/2020  Mormon Preference Protestant      07/15/2020  Branch of  Never in   .        Physical Examination   Vital Signs   12/16/2020 10:08 CST     Temperature Oral          36.7 DegC                             Temperature Oral (calculated)             98.06 DegF                             Peripheral  Pulse Rate     79 bpm                             SpO2                      100 %                             Oxygen Therapy            Room air                             Systolic Blood Pressure   138 mmHg                             Diastolic Blood Pressure  83 mmHg                             Blood Pressure Location   Left arm                             Manual Cuff BP            No     Measurements from flowsheet : Measurements   12/16/2020 10:08 CST     Weight Dosing             60.000 kg                             Weight Measured           60.0 kg                             Weight Measured and Calculated in Lbs     132.28 lb                             Height/Length Dosing      161.00 cm                             Height/Length Measured    161 cm                             BSA Measured              1.64 m2                             Body Mass Index Measured  23.15 kg/m2     General:  Alert and oriented, No acute distress.    Eye:  Pupils are equal, round and reactive to light, Extraocular movements are intact, Normal conjunctiva.    HENT:  Normocephalic, Tympanic membranes are clear, Normal hearing, Oral mucosa is moist, No pharyngeal erythema.    Neck:  Supple, Non-tender, No jugular venous distention, No lymphadenopathy.    Respiratory:  Lungs are clear to auscultation, Respirations are non-labored, Symmetrical chest wall expansion.    Cardiovascular:  Normal rate, Regular rhythm, No gallop, No edema.    Gastrointestinal:  Soft, Non-tender, Non-distended, Normal bowel sounds.    Lymphatics:  No lymphadenopathy neck, axilla, groin.    Musculoskeletal:  Normal range of motion.    Integumentary:  Warm, Intact, No pallor.    Neurologic:  Alert, Oriented, No focal deficits, Cranial Nerves II-XII are grossly intact, Normal deep tendon reflexes.    Cognition and Speech:  Oriented, Speech clear and coherent, Functional cognition intact.    Psychiatric:  Cooperative, Appropriate mood & affect, Normal judgment.     ECOG Performance Scale: 0 - Fully active; no performance restrictions.      Review / Management   Results review   Laboratory Results   Today's Lab Results : PowerNote Discrete Results   12/16/2020 10:13 CST     Est Creat Clearance Ser   93.10 mL/min    12/16/2020 9:58 CST      POC TCO2                  28.0 mmol/L                             POC Hb                    11.2 mg/dL  LOW                             POC Hct                   33.0 %  LOW                             POC Sodium                139 mmol/L                             POC Potassium             3.4 mmol/L  LOW                             POC Chloride              101 mmol/L                             POC Ion Calcium           1.21 mmol/L                             POC Glucose               176 mg/dL  HI                             POC BUN                   8.0 mg/dL                             POC Creatinine            0.5 mg/dL  LOW                             POC AGAP                  15.0  NA    12/16/2020 9:40 CST      WBC                       3.7 x10(3)/mcL  LOW                             RBC                       2.84 x10(6)/mcL  LOW                             Hgb                       10.1 gm/dL  LOW                             Hct                       31.4 %  LOW                             Platelet                  415 x10(3)/mcL  HI                             MCV                       110.6 fL  HI                             MCH                       35.6 pg  HI                             MCHC                      32.2 gm/dL  LOW                             RDW                       14.9 %                             MPV                       8.8 fL  LOW                             Abs Neut                  2.24 x10(3)/mcL                             NEUT%                     60.6 %  NA                             NEUT#                     2.2 x10(3)/mcL                             LYMPH%                    22.5 %  NA                              LYMPH#                    0.8 x10(3)/mcL                             MONO%                     9.8 %  NA                             MONO#                     0.4 x10(3)/mcL                             EOS%                      5.4 %  NA                             EOS#                      0.2 x10(3)/mcL                             BASO%                     1.4 %  NA                             BASO#                     0.0 x10(3)/mcL             Reviewed labs: Consistent with previous results   Documentation reviewed:  Records from referring facility, Reviewed home medications.    Response to Treatment:  Stable disease.       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  DKA new onset diabetes secondary to immunotherapy  osteopenia  Chemotherapy encounter  Pancytopenia due to chemotherapy      Repeat imaging at [facility] on 7/28/20 showed a new superior left lower lobe 1 cm spiculated nodule, progressive subcarinal lymphadenopathy 3.2 x 1.4 cm  9/8/20: Started single agent Gemzar, Day 1 Day 8 q 3 weeks, tolerating well  Follow up with [facility] on 12/9/20 for restaging--stable disease      PLAN:  Continue gemcitabine, Cycle 5 today  next week labs and C5d1 infusion visit  RTC in 3 weeks with repeat labs and TD visit for cycle 6  Has repeat follow-up with [facility] in 3/2021 after cycle 8       Tho Kirby MD                 Other Physicians   [facility] team    Manual Repair Warning Statement: We plan on removing the manually selected variable below in favor of our much easier automatic structured text blocks found in the previous tab. We decided to do this to help make the flow better and give you the full power of structured data. Manual selection is never going to be ideal in our platform and I would encourage you to avoid using manual selection from this point on, especially since I will be sunsetting this feature. It is important that you do one of two things with the customized text below. First, you can save all of the text in a word file so you can have it for future reference. Second, transfer the text to the appropriate area in the Library tab. Lastly, if there is a flap or graft type which we do not have you need to let us know right away so I can add it in before the variable is hidden. No need to panic, we plan to give you roughly 6 months to make the change.

## 2022-04-30 NOTE — PROGRESS NOTES
Patient:   Lucy Love            MRN: [id number]            FIN: [id number]               Age:   65 years     Sex:  Female     :  1955   Associated Diagnoses:   None   Author:   Tho Kirby MD      Chief Complaint   Current Treatment Abraxane 20 10:53 CDT      Feels like she has a sinus infection. no fever      Visit Information   65 yr old female   Initial [facility] Visit: Previously untreated disease   1. 2017 Tripped over cat injuring right posterior chest. CXR reveals right lung mass.   2.10/6/2017 CT the chest with contrast 6.9 by 3.6 by 3.1 cm right middle lobe mass invading the right middle lobe bronchus, 2mm nodule left apex   3.10/19/2017 Bronchoscopy, EBUS with 4R biopsy demonstrating squamous cell carcinoma. 4R lymph node negative.   4.10/26/2017 PET CT right middle lobe lung mass measuring 6.8cm with SUV 18.8 extending into the right hilum. Right 11th rib with a displaced fracture SUV 4.1. Soft tissue throughout left maxillary sinus with extension into left nasal cavity  5. MRI of the Brain is negative for intracranial metastases but showed a left maxillary sinus mass extending in to the left nasal cavity.   4. 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 This needs to be removed since there is a small chance this could be harboring an invasive malignancy after the definitive treatments for her lung cancer.   5. An EBUS done outside was inconclusive.   6. EBUS done  [facility] was Negative for N2 disease.   8. The final stage is T4N0M0 (Stage III) Squamous cell Carcinoma of the lung. Plan is to do induction chemotherapy followed by surgery. The patient did not qualify for the cisplatin, docetaxel and Nintedanib secondary to the central nature of the tumor and the presence of vascular invasion.   9. 2017 - 2018 Chemotherapy/3 cycles of carboplatin and Abraxane  10. Completed definitive radiation therapy to  "the Chest 05/23/18.   11. 10/25/19. Newly noted bilateral pulmonary nodules Biopsy confirms squamous cell carcinoma.   12. 11/2019. The patient has NFE2L2 mutation and thus enrolled on the glutaminase inhibitor trial run by Dr. Bertrand Smith.   13. Right lung has progressed after 2 cycles of treatment with [id number] (Glutimase inhibitor).   14. PD-L1 80%. Initiated on single agent Pembrolizumab on 02/07/2020 --3/2020  - Restaging s/p c#2 showed good treatment response, how ever developed DKA requiring ICU admission and continuation of insulin.                - Hence d/c'd Pemebro and will  discussed initiate Carboplatin+ Abraxane  vs observation locally with Dr. Kirby (with DR. Patel)           Interval History   Seen by primary care for help with DM and management  Medications have been slightly changed.  She tolerated cycle 1 of Abraxane well.  She did get her increase in sugars.  And her sugars have gone back down.  She has no nausea or vomiting.  No rash.  She does have alopecia.  No numbness or tingling.  "I tolerated chemotherapy well"      Review of Systems   12 point ROS performed + in Interim history, otherwise negative      Health Status            Allergies:    Allergic Reactions (Selected)  Severity Not Documented  Iodine- Rash, swelling and throat swells.  Latex- Rash.  Norco- Dillusions, insomnia and itching.  Phenergan- Insomnia.  Shellfish- Rash, throat closes and face swells.  Tape- Red rash and gang green.   Current medications:  (Selected)   Inpatient Medications  Ordered  DuoNeb 0.5 mg-2.5 mg/3 mL inhalation solution: 3 mL, NEB, Once  Versed: 1 mg, 1 mL, IV Push, Once  Prescriptions  Prescribed  Zofran 4 mg oral tablet: 4 mg, 1 tab(s), Oral, q6hr, PRN, PRN: as needed for nausea/vomiting, 30 tab(s), 0 Refill(s)  glipiZIDE 5 mg oral tablet: 5 mg, 1 tab(s), Oral, Daily, 30 tab(s), 0 Refill(s)  metFORMIN 1000 mg oral tablet: 1,000 mg, 1 tab(s), Oral, BID, 180 tab(s), 1 Refill(s)  ropinirole 0.5 mg oral " tablet: See Instructions, 1 tab(s) Oral 1 to 3 hours before bedtime, 30 tab(s), 2 Refill(s)  traMADol 50 mg oral tablet: 50 mg, 1 tab(s), Oral, q6hr, PRN: pain, mild, 30 tab(s), 0 Refill(s)  Documented Medications  Documented  Acidophilus oral tablet: tab 1, Oral, Daily, 0 Refill(s)  Aleve 220 mg oral tablet: 1 cap(s), Oral, q8hr, PRN: as needed for pain, 40 cap(s), 0 Refill(s)  Ambien 10 mg oral tab -[facility]: 1 tab(s), Oral, Once a day (at bedtime), PRN: as needed for insomnia, 0 Refill(s)  Bystolic 10 mg oral tablet: 10 mg, 1 tab(s), Oral, Daily, 30 tab(s)  Centrum Women's oral tablet: 1 tab(s), Oral, Daily, 0 Refill(s)  Feosol 200 mg (65 mg elemental iron) oral tablet: 200 mg, 1 tab(s), Oral, Daily, 0 Refill(s)  Flexeril 5 mg oral tablet: 5 mg, 1 tab(s), Oral, Daily, prn at bedtime, 30 tab(s), 0 Refill(s)  Lubricant Eye Drops ophthalmic solution: 1 drop(s), Eye-Both, BID, PRN: as needed for dry eyes, 30 EA, 0 Refill(s)  Misc Prescription: Biotin 10,000 mcg daily, 0 Refill(s)  Singulair 10 mg oral TABLET: 10 mg, 1 tab(s), Oral, qPM, 30 tab(s)  Soliqua 100/33 subcutaneous solution: 18 units, Subcutaneous, Daily, 30 units q am, 0 Refill(s)  Symbicort 160 mcg-4.5 mcg/inh inhalation aerosol: 2 puff(s), INH, BID, PRN: wheezing and sob, 0 Refill(s)  Trintellix 20 mg oral tablet: 20 mg, 1 tab(s), Oral, Daily, 0 Refill(s)  Vitamin B12 Injection -CCA: qMonth  Vitamin D3 5000 intl units (125 mcg) oral capsule: 5,000 IntUnit, 1 cap(s), Oral, Daily, with food, 100 cap(s), 0 Refill(s)  alPRAzolam 0.5 mg oral tablet: 0.5 mg, 1 tab(s), Oral, At Bedtime, PRN: for anxiety  amlodipine-benazepril 10 mg-20 mg oral capsule: 1 cap(s), Oral, Daily  busPIRone 7.5 mg oral tablet: 7.5 mg, 1 tab(s), Oral, BID, 0 Refill(s)  loratadine 10 mg oral capsule: 10 mg, 1 cap(s), Oral, Daily  magnesium oxide 400 mg oral tablet: 400 mg, 1 tab(s), Oral, Daily, 0 Refill(s)  meloxicam 15 mg oral tablet: 15 mg, 1 tab(s), Oral, Daily, 30 tab(s), 0  Refill(s)  potassium chloride 20 mEq oral TABLET extended release: 20 mEq, 1 tab(s), Oral, Daily, 30 tab(s)  traMADol 50 mg oral tablet: 50 mg, 1 tab(s), Oral, TID, prn, PRN: pain, 30 tab(s), 0 Refill(s)   Problem list:    All Problems  Acid reflux / 8754604452 / Confirmed  Asthma / 181914530 / Confirmed  Vitamin B12 deficiency / 231150464 / Confirmed  Low vitamin D level / 201991146 / Confirmed  History of gastric bypass / 3556830808 / Confirmed  Hypertension / 62231655 / Confirmed  Depression with anxiety / 658614405 / Confirmed  NIKI on CPAP / 491588359 / Confirmed  Stage IV squamous cell carcinoma of lung / 981486681 / Confirmed  Diabetes mellitus type 2 in nonobese / 2339021039 / Confirmed,    Active Problems (10)  Acid reflux   Asthma   Depression with anxiety   Diabetes mellitus type 2 in nonobese   History of gastric bypass   Hypertension   Low vitamin D level   NIKI on CPAP   Stage IV squamous cell carcinoma of lung   Vitamin B12 deficiency         Histories        Past medical history:   Hypertension, GERD, anxiety, depressive disorder, valvular heart disease (mitral regurg tricuspid regurg)  Sleep apnea, chronic sinusitis,    Past surgical history  March 15, 2018 thoracotomy with exploration  11/2017 EBUS  2014 bunionectomy  Eyelid surgery 2014  Cholecystectomy 2005  Carpal tunnel release 2001  Partial sclerectomy 2000  Tonsillectomy 1974  Hernia repair 2002 in 2006  Refractive surgery  Stomach stapling gastric bypass  Family history   maternal grandmother GI cancer  Mother had neck carcinoma  Glaucoma macular degeneration runs in the family      Procedure history:    Mammogram ([id number]) on 1/1/2019 at 63 Years.  Bronchoscopy w/ Needle Aspir Biopsy(s) on 10/19/2017 at 62 Years.  Comments:  10/19/2017 13:24 Chasity Mistry RRT  auto-populated from documented surgical case  Bronchoscopy w/ Endobronch Biopsy(s) on 10/19/2017 at 62 Years.  Comments:  10/19/2017 13:24 Chasity Mistry RRT  LEIDA  auto-populated from documented surgical case  Bronchoscopy with Brushings on 10/19/2017 at 62 Years.  Comments:  10/19/2017 13:24 Chasity Mistry RRT  auto-populated from documented surgical case  Bronchoscopy, Ebus, 2 or Less Samples on 10/19/2017 at 62 Years.  Comments:  10/19/2017 13:24 Chasity Mistry RRT  auto-populated from documented surgical case  gastric sleeve and bypass.  cheryl fundiplication.  bilatteral bunnionectomy.  bilatteral carpel tunnel repair.  lasix eye surgery.  partial hysterectomy.  cholecysectomy.  tonsillectomy.  hemmorhedectomy.   Social History        Social & Psychosocial Habits    Alcohol  02/06/2015 Risk Assessment: Denies Alcohol Use    10/10/2017  Use: Current    Type: Liquor    Frequency: Daily    04/06/2020  Use: Past    Type: Liquor    Frequency: 1-2 times per week    Substance Use  02/06/2015 Risk Assessment: Denies Substance Abuse    Tobacco  02/06/2015 Risk Assessment: Denies Tobacco Use    04/22/2020  Use: Former smoker, quit more    Patient Wants Consult For Cessation Counseling No    Type: Cigarettes    Tobacco use per day: 20    Started at age: 15 Years    Comment: Quit date 01/01/1990 - 04/06/2020 14:32 - Anitha White    04/22/2020  Use: Former smoker, quit more    Patient Wants Consult For Cessation Counseling No    Abuse/Neglect  04/06/2020  SHX Any signs of abuse or neglect No    Feels unsafe at home: No    04/22/2020  SHX Any signs of abuse or neglect No    04/22/2020  SHX Any signs of abuse or neglect No  .        Physical Examination   Vital Signs   5/13/2020 10:53 CDT      Temperature Oral          36.8 DegC                             Temperature Oral (calculated)             98.24 DegF                             Peripheral Pulse Rate     89 bpm                             Systolic Blood Pressure   141 mmHg  HI                             Diastolic Blood Pressure  93 mmHg  HI     Measurements from flowsheet : Measurements   5/13/2020 10:53 CDT       Weight Dosing             72.7 kg                             Weight Measured           72.7 kg                             Weight Measured and Calculated in Lbs     160.27 lb                             Height/Length Dosing      158 cm                             Height/Length Measured    158 cm                             BSA Measured              1.79 m2                             Body Mass Index Measured  29.12 kg/m2     General:  Alert and oriented, No acute distress.    Eye:  Pupils are equal, round and reactive to light, Extraocular movements are intact, Normal conjunctiva.    HENT:  Normocephalic, Tympanic membranes are clear, Normal hearing, Oral mucosa is moist, No pharyngeal erythema.    Neck:  Supple, No jugular venous distention, No lymphadenopathy, No thyromegaly.    Respiratory:  Lungs are clear to auscultation, Respirations are non-labored, Symmetrical chest wall expansion, No chest wall tenderness.    Cardiovascular:  Normal rate, Regular rhythm, No murmur, No gallop, Good pulses equal in all extremities, Normal peripheral perfusion.    Gastrointestinal:  Soft, Non-tender, Non-distended, Normal bowel sounds, No organomegaly.    Lymphatics:  No lymphadenopathy neck, axilla, groin.    Musculoskeletal:  Normal range of motion, No tenderness, No swelling, No deformity, Normal gait.    Integumentary:  Warm, Intact, No pallor, No rash.    Neurologic:  Alert, Oriented, Normal motor function, No focal deficits, Cranial Nerves II-XII are grossly intact, Normal deep tendon reflexes.    Cognition and Speech:  Oriented, Speech clear and coherent, Functional cognition intact.    Psychiatric:  Cooperative, Appropriate mood & affect, Normal judgment.    ECOG Performance Scale: 0 - Fully active; no performance restrictions.      Review / Management   Pathology  (J) LUNG, RIGHT MIDDLE LOBE, LOBECTOMY:  TUMOR TYPE: SQUAMOUS CELL CARCINOMA, MODERATELY DIFFERENTIATED.      Examination: CT CHEST ABDOMEN W  CONTRAST, 3/30/2020 2:14 PM  Lungs and Airways: The patient is status post right middle lobectomy. Stable post radiation findings are noted in the right lung. There is interval decrease in size of pulmonary nodules. For example, a right upper lobe nodule measures 6 mm, previously 9 mm. 3 mm left upper lobe nodule was 7 mm. Right lower lobe nodule measures 1.3 cm, previously 2.2 cm. There is subsegmental atelectasis in the left lower lobe.  Heart and Mediastinum: Multiple mediastinal lymph nodes have decreased in size. For example, a right paratracheal lymph node measuring 7 mm was previously 1.3 cm. Another right paratracheal lymph node measures 6 mm, previously 1.4 cm. A left paratracheal lymph node is also smaller. The heart and pericardium are within normal limits.  There is moderate atherosclerotic calcification of the coronary arteries and aorta.  Abdomen: The patient is status post cholecystectomy. There are stable findings of prior gastric surgery. Hypodense renal lesions are stable. There is atherosclerotic calcification of the abdominal aorta. A hypodense right hepatic lesion is stable. Dilatation of the biliary duct is unchanged. A 1.3 cm a left adrenal nodule is unchanged. There is  Bones:  The visualized bony thorax shows degenerative changes. The patient is status post right posterior thoracotomy. Sclerotic lesion in T5 vertebral body is unchanged, may represent a bone island  IMPRESSION: Decrease in size of mediastinal lymphadenopathy and pulmonary metastases   Laboratory Results   Last 5 Days Lab Results : PowerNote Discrete Results   5/13/2020 10:53 CDT      POC TCO2                  25.0 mmol/L                             POC Hb                    12.9 mg/dL                             POC Hct                   38.0 %                             POC Sodium                137 mmol/L  LOW                             POC Potassium             4.5 mmol/L                             POC Chloride               102 mmol/L                             POC Ion Calcium           1.15 mmol/L                             POC Glucose               348 mg/dL  HI                             POC BUN                   10.0 mg/dL                             POC Creatinine            0.8 mg/dL                             POC AGAP                  16.0  NA    5/13/2020 10:25 CDT      WBC                       6.4 x10(3)/mcL                             RBC                       3.85 x10(6)/mcL  LOW                             Hgb                       11.4 gm/dL  LOW                             Hct                       36.8 %  LOW                             Platelet                  344 x10(3)/mcL                             MCV                       95.6 fL  HI                             MCH                       29.6 pg                             MCHC                      31.0 gm/dL  LOW                             RDW                       12.7 %                             MPV                       9.0 fL  LOW                             Abs Neut                  4.14 x10(3)/mcL                             NEUT%                     64.5 %  NA                             NEUT#                     4.1 x10(3)/mcL                             LYMPH%                    19.8 %  NA                             LYMPH#                    1.3 x10(3)/mcL                             MONO%                     9.2 %  NA                             MONO#                     0.6 x10(3)/mcL                             EOS%                      4.8 %  NA                             EOS#                      0.3 x10(3)/mcL                             BASO%                     1.2 %  NA                             BASO#                     0.1 x10(3)/mcL           Impression and Plan   1.   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,  2.  DKA new onset diabetes secondary to immunotherapy  3.  Abnormal  thyroid function test  4.  Mild cytopenias secondary to chemotherapy      I    Plan:   Continue to follow-up with endocrinology   2 cycles of single agent Abraxane  today   she will reach out to [facility] to see if they have scans for her and f/u  evaluation  decrease steroid to 4mg  hold iron   RTC in 3 week with repeat scan or go back to [place]          IF wee need to change schedule to go back okay                     Tho Kirby MD

## 2022-04-30 NOTE — H&P
Patient:   Lucy Love            MRN: [id number]            FIN: [id number]               Age:   65 years     Sex:  Female     :  1955   Associated Diagnoses:   None   Author:   Fareed Fitzgerald MD      Basic Information     65-year-old female with stage IV lung cancer undergoing chemotherapy that has resulted in diabetes.  She presented the ER with a CBG that read high and blood sugars were over 500.  Her last chemotherapy was Wednesday and she gets it every 3 weeks.  She has been placed in observation status to initiate Lantus 10 units nightly starting tonight, give IV fluid, and likely should be ready for discharge on Lantus tomorrow once we have a better idea of the appropriate dose.      Chief Complaint   2020 10:53 CDT      here with weakness and glucose of 569 son gave 15units reg insulin/ cbg in triage 350's/ med placed on for lung cancer made her sugars high and  needed to be on insulin/ chemo wed with [facility] oncologist md romano/        Review of Systems   Complaint of restless leg syndrome during the daytime takes Requip at night with good effect but it wears off during the day.  10 systems reviewed and negative except as noted      Health Status   Allergies:    Allergic Reactions (Selected)  Severity Not Documented  Iodine- Rash, swelling and throat swells.  Latex- Rash.  Norco- Dillusions, insomnia and itching.  Phenergan- Insomnia.  Shellfish- Rash, throat closes and face swells.  Tape- Red rash and gang green.,    Allergies (6) Active Reaction  iodine throat swells  Latex Rash  Norco insomnia  Phenergan insomnia  shellfish rash  Tape red rash     Current medications:  (Selected)   Inpatient Medications  Ordered  Bystolic: 10 mg, form: Tab, Oral, Daily, first dose 20 9:00:00 CDT  Dextrose 50% and Water  (50 mL vial/syringe): 25 mL, 12.5 gm =, form: Injection, IV Push, As Directed PRN for blood glucose, Infuse over: 5 minute(s), first dose 20 15:48:00 CDT,  Unconscious patient: Repeat as ordered per protocol.  Dextrose 50% and Water  (50 mL vial/syringe): 25 mL, 12.5 gm =, form: Injection, IV Push, Once PRN for blood glucose, Infuse over: 5 minute(s), first dose 20 15:48:00 CDT, Unconscious patient: Look for other source of altered mental status.  Dextrose 50% and Water  (50 mL vial/syringe): 50 mL, 25 gm =, form: Injection, IV Push, As Directed PRN for blood glucose, Infuse over: 5 minute(s), first dose 20 15:48:00 CDT, Unconscious patient: Repeat as ordered per protocol.  Dextrose 50% in Water intravenous solution: 25 mL, 12.5 gm =, form: Injection, IV Push, As Directed PRN for blood glucose, Infuse over: 5 minute(s), first dose 20 15:48:00 CDT, Conscious patient.  DuoNeb 0.5 mg-2.5 mg/3 mL inhalation solution: 3 mL, form: Soln, NEB, Once, first dose 10/19/17 8:38:00 CDT, stop date 10/19/17 8:38:00 CDT  Lantus 100 units/mL subcutaneous inj.: 10 units, form: Injection, Subcutaneous, At Bedtime, first dose 20 21:00:00 CDT, Waste Code BK  Lovenox: 40 mg, form: Injection, Subcutaneous, Daily, first dose 20 9:00:00 CDT  NS (0.9% Sodium Chloride) Infusion 1,000 mL: 1,000 mL, 1,000 mL, IV, 100 mL/hr, start date 20 10:46:00 CDT, 1.73, m2  Versed: 1 mg, form: Injection, IV Push, Once, first dose 10/19/17 8:00:00 CDT, stop date 10/19/17 8:00:00 CDT, 1 to 2 mg initial then 1 mg every 2 min until sedation level 2 achieved, 24  Zofran ODT 4 mg oral tablet, disintegratin mg, form: Tab-Dis, Oral, TID PRN for nausea/vomiting, first dose 20 15:48:00 CDT  alPRAzolam 0.5 mg oral tablet: 0.5 mg, form: Tab, Oral, At Bedtime PRN for anxiety, first dose 20 15:48:00 CDT  amlodipine-benazepril 2.5 mg-10 mg oral capsule: 2 cap(s), form: Cap, Oral, Daily, first dose 20 18:00:00 CDT  busPIRone 5 mg oral tablet: 7.5 mg, form: Tab, Oral, BID, first dose 20 21:00:00 CDT  chlorpheniramine-hydrocodone 8 mg-10 mg/5 mL oral suspension,  extended release: 5 mL, form: Susp-ER, Oral, q12hr PRN for cough, first dose 05/18/20 7:10:00 CDT  cyclobenzaprine: 10 mg, form: Tab, Oral, qPM, first dose 05/17/20 21:00:00 CDT  glucagon: 1 mg, form: Injection, IM, q10min PRN for blood glucose, first dose 05/17/20 15:48:00 CDT, Conscious Patient with NO IV access available and BG < 45 mg/dl.  glucagon: 1 mg, form: Injection, IM, q10min PRN for blood glucose, first dose 05/17/20 15:48:00 CDT, Unconscious patient: Patient with NO IV access available and BG < 70 mg/dl.  hydrochlorothiazide-triamterene 25 mg-37.5 mg oral tablet: 1 tab(s), form: Tab, Oral, Daily, first dose 05/17/20 15:49:00 CDT  insulin lispro: 2-14 units, form: Injection, Subcutaneous, As Directed PRN for blood glucose, first dose 05/17/20 15:48:00 CDT  loratadine 10 mg oral capsule: 10 mg, form: Tab, Oral, Daily, first dose 05/18/20 9:00:00 CDT  magnesium oxide: 400 mg, form: Tab, Oral, Daily, first dose 05/18/20 9:00:00 CDT  potassium chloride 20 mEq oral TABLET extended release: 20 mEq, form: Tab-ER, Oral, Daily, first dose 05/18/20 9:00:00 CDT  rOPINIRole: 0.5 mg, form: Tab, Oral, BID, first dose 05/18/20 11:00:00 CDT  traMADol: 50 mg, form: Tab, Oral, q6hr PRN for pain, first dose 05/17/20 18:22:00 CDT  zolpidem 5 mg oral tablet: 10 mg, form: Tab, Oral, Once a day (at bedtime) PRN for sleep, first dose 05/17/20 15:51:00 CDT  Prescriptions  Prescribed  Trulicity Pen 1.5 mg/0.5 mL subcutaneous solution: 1.5 mg = 0.5 mL, Subcutaneous, qWeek, # 2.5 mL, 11 Refill(s), Pharmacy: [facility], 158, cm, Height/Length Dosing, 04/22/20 15:18:00 CDT, 75.3, kg, Weight Dosing, 04/22/20 15:18:00 CDT  Zofran 4 mg oral tablet: 4 mg = 1 tab(s), Oral, q6hr, PRN PRN as needed for nausea/vomiting, PRN, # 30 tab(s), 0 Refill(s), Pharmacy: [facility], 166, cm, Height/Length Dosing, 04/01/20 15:10:00 CDT, 78.1, kg, Weight Dosing, 04/01/20 15:10:00 CDT  glipiZIDE 5 mg oral tablet: 5 mg = 1 tab(s),  Oral, Daily, # 30 tab(s), 0 Refill(s), Pharmacy: [facility], 158, cm, Height/Length Dosing, 04/22/20 15:18:00 CDT, 75.3, kg, Weight Dosing, 04/22/20 15:18:00 CDT  metFORMIN 1000 mg oral tablet: 1,000 mg = 1 tab(s), Oral, BID, # 180 tab(s), 1 Refill(s), Pharmacy: [facility], 158, cm, Height/Length Dosing, 04/22/20 15:18:00 CDT, 75.3, kg, Weight Dosing, 04/22/20 15:18:00 CDT  ropinirole 0.5 mg oral tablet: See Instructions, 1 tab(s) Oral 1 to 3 hours before bedtime, # 30 tab(s), 2 Refill(s), Pharmacy: [facility], 158, cm, Height/Length Dosing, 04/22/20 15:18:00 CDT, 75.3, kg, Weight Dosing, 04/22/20 15:18:00 CDT  traMADol 50 mg oral tablet: 50 mg = 1 tab(s), Oral, q6hr, PRN PRN pain, mild, # 30 tab(s), 0 Refill(s), Pharmacy: [facility], 158, cm, Height/Length Dosing, 04/22/20 15:18:00 CDT, 75.3, kg, Weight Dosing, 04/22/20 15:18:00 CDT  Documented Medications  Documented  Acidophilus oral tablet: tab 1, Oral, Daily, 0 Refill(s)  Aleve 220 mg oral tablet: = 1 cap(s), Oral, q8hr, PRN PRN as needed for pain, # 40 cap(s), 0 Refill(s)  Ambien 10 mg oral tab -[facility]: = 1 tab(s), Oral, Once a day (at bedtime), PRN PRN as needed for insomnia, 0 Refill(s)  Bystolic 10 mg oral tablet: 10 mg = 1 tab(s), Oral, Daily, # 30 tab(s), 0 Refill(s)  Centrum Women's oral tablet: 1 tab(s), Oral, Daily, 0 Refill(s)  Feosol 200 mg (65 mg elemental iron) oral tablet: 200 mg = 1 tab(s), Oral, Daily, 0 Refill(s)  Flexeril 5 mg oral tablet: 5 mg = 1 tab(s), Oral, Daily, prn at bedtime, # 30 tab(s), 0 Refill(s)  Lubricant Eye Drops ophthalmic solution: 1 drop(s), Eye-Both, BID, PRN PRN as needed for dry eyes, # 30 EA, 0 Refill(s)  Misc Prescription: Biotin 10,000 mcg daily, 0 Refill(s)  NovoLIN R FlexPen 100 units/mL injectable solution: Subcutaneous, As Directed  Soliqua 100/33 subcutaneous solution:   Symbicort 160 mcg-4.5 mcg/inh inhalation aerosol: 2 puff(s), INH, BID, PRN PRN wheezing and sob, 0  Refill(s)  Trintellix 20 mg oral tablet: 20 mg = 1 tab(s), Oral, Daily, 0 Refill(s)  Vitamin B12 Injection -CCA: qMonth, 0 Refill(s)  Vitamin D3 5000 intl units (125 mcg) oral capsule: 5,000 IntUnit = 1 cap(s), Oral, Daily, with food, # 100 cap(s), 0 Refill(s)  alPRAzolam 0.5 mg oral tablet: 0.5 mg = 1 tab(s), Oral, At Bedtime, PRN for anxiety, 0 Refill(s)  albuterol-ipratropium 2.5 mg-0.5 mg/3 mL inhalation solution: 3 mL, NEB, q6hr  amlodipine-benazepril 10 mg-20 mg oral capsule: 1 cap(s), Oral, Daily, 0 Refill(s)  benzonatate 200 mg oral capsule: 200 mg = 1 cap(s), Oral, q4-6hr  busPIRone 7.5 mg oral tablet: 7.5 mg = 1 tab(s), Oral, BID, 0 Refill(s)  chlorpheniramine-hydrocodone 8 mg-10 mg/5 mL oral suspension, extended release: 5 mL, Oral, q12hr  cyclobenzaprine 10 mg oral tablet: 10 mg = 1 tab(s), Oral, qPM  fluconazole 150 mg oral tablet: 150 mg = 1 tab(s), Oral, Once  hydrochlorothiazide-triamterene 25 mg-37.5 mg oral capsule: 1 cap(s), Oral, Daily  levoFLOXacin 500 mg oral tablet: 500 mg = 1 tab(s), Oral, Daily  loratadine 10 mg oral capsule: 10 mg = 1 cap(s), Oral, Daily, 0 Refill(s)  magnesium oxide 400 mg oral tablet: 400 mg = 1 tab(s), Oral, Daily, 0 Refill(s)  meloxicam 15 mg oral tablet: 15 mg = 1 tab(s), Oral, Daily, # 30 tab(s), 0 Refill(s)  metFORMIN 500 mg oral tablet, extended release: 1000 mg = 2 tab(s), Oral, BID  montelukast 10 mg oral TABLET: 10 mg = 1 tab(s), Oral, Daily  mupirocin 2% topical ointment: 1 david, TOP, TID  nitrofurantoin macrocrystals-monohydrate 100 mg oral capsule: 100 mg = 1 cap(s), Oral, BID  oseltamivir 75 mg oral capsule: 75 mg = 1 cap(s), Oral, BID  potassium chloride 20 mEq oral TABLET extended release: 20 mEq = 1 tab(s), Oral, Daily, # 30 tab(s), 0 Refill(s)   Problem list:    All Problems  Acid reflux / SNOMED CT 7359529274 / Confirmed  Asthma / SNOMED CT 382044316 / Confirmed  Vitamin B12 deficiency / SNOMED CT 913705445 / Confirmed  Low vitamin D level / SNOMED CT  698274457 / Confirmed  History of gastric bypass / SNOMED CT 9830360516 / Confirmed  Hypertension / SNOMED CT 08282072 / Confirmed  Depression with anxiety / SNOMED CT 745876083 / Confirmed  NIKI on CPAP / SNOMED CT 342634367 / Confirmed  Stage IV squamous cell carcinoma of lung / SNOMED CT 700255542 / Confirmed  Diabetes mellitus type 2 in nonobese / SNOMED CT 2992594022 / Confirmed,    Active Problems (10)  Acid reflux   Asthma   Depression with anxiety   Diabetes mellitus type 2 in nonobese   History of gastric bypass   Hypertension   Low vitamin D level   NIKI on CPAP   Stage IV squamous cell carcinoma of lung   Vitamin B12 deficiency         Histories   Past Medical History:    No active or resolved past medical history items have been selected or recorded.   Family History:    No family history items have been selected or recorded.   Procedure history:    Mammogram ([id number]) on 1/1/2019 at 63 Years.  Bronchoscopy w/ Needle Aspir Biopsy(s) on 10/19/2017 at 62 Years.  Comments:  10/19/2017 13:24 Chasity Mistry RRT.  auto-populated from documented surgical case  Bronchoscopy w/ Endobronch Biopsy(s) on 10/19/2017 at 62 Years.  Comments:  10/19/2017 13:24 Chasity Mistry RRT.  auto-populated from documented surgical case  Bronchoscopy with Brushings on 10/19/2017 at 62 Years.  Comments:  10/19/2017 13:24 Chasity Mistry RRT.  auto-populated from documented surgical case  Bronchoscopy, Ebus, 2 or Less Samples on 10/19/2017 at 62 Years.  Comments:  10/19/2017 13:24 Chasity Mistry RRT.  auto-populated from documented surgical case  gastric sleeve and bypass.  cheryl fundiplication.  bilatteral bunnionectomy.  bilatteral carpel tunnel repair.  lasix eye surgery.  partial hysterectomy.  cholecysectomy.  tonsillectomy.  hemmorhedectomy.   Social History        Social & Psychosocial Habits    Alcohol  02/06/2015 Risk Assessment: Denies Alcohol Use    10/10/2017  Use: Current    Type: Liquor     Frequency: Daily    04/06/2020  Use: Past    Type: Liquor    Frequency: 1-2 times per week    05/17/2020  Use: Past    Substance Use  02/06/2015 Risk Assessment: Denies Substance Abuse    Tobacco  02/06/2015 Risk Assessment: Denies Tobacco Use    03/19/2020  Use: Former smoker, quit more    Patient Wants Consult For Cessation Counseling N/A    04/22/2020  Use: Former smoker, quit more    Patient Wants Consult For Cessation Counseling No    Type: Cigarettes    Tobacco use per day: 20    Started at age: 15 Years    Comment: Quit date 01/01/1990 - 04/06/2020 14:32 - MaryanarajeshAnitha    05/13/2020  Use: Former smoker, quit more    Patient Wants Consult For Cessation Counseling No    05/17/2020  Use: Former smoker, quit more    Patient Wants Consult For Cessation Counseling No    Abuse/Neglect  04/06/2020  SHX Any signs of abuse or neglect No    Feels unsafe at home: No    04/22/2020  SHX Any signs of abuse or neglect No    04/22/2020  SHX Any signs of abuse or neglect No    05/13/2020  SHX Any signs of abuse or neglect No    05/17/2020  SHX Any signs of abuse or neglect No    Feels unsafe at home: No    Safe place to go: Yes  .        Physical Examination   Vital Signs   5/18/2020 8:18 CDT       SpO2                      98 %                             Oxygen Therapy            Room air    5/18/2020 7:00 CDT       Temperature Oral          36.5 DegC                             Temperature Oral (calculated)             97.70 DegF                             Peripheral Pulse Rate     86 bpm                             SpO2                      96 %                             Systolic Blood Pressure   116 mmHg                             Diastolic Blood Pressure  74 mmHg    5/18/2020 3:41 CDT       Temperature Oral          36.6 DegC                             Temperature Oral (calculated)             97.88 DegF                             Peripheral Pulse Rate     87 bpm                             SpO2                       95 %                             Systolic Blood Pressure   134 mmHg                             Diastolic Blood Pressure  66 mmHg    5/17/2020 22:00 CDT      Temperature Oral          36.5 DegC                             Temperature Oral (calculated)             97.70 DegF                             Peripheral Pulse Rate     86 bpm                             SpO2                      93 %  LOW                             Oxygen Therapy            Room air                             Systolic Blood Pressure   106 mmHg                             Diastolic Blood Pressure  69 mmHg                             Mean Arterial Pressure, Cuff              81 mmHg    5/17/2020 20:15 CDT      Oxygen Therapy            Room air    5/17/2020 19:40 CDT      Temperature Oral          36.8 DegC                             Temperature Oral (calculated)             98.24 DegF                             Peripheral Pulse Rate     96 bpm                             SpO2                      97 %                             Systolic Blood Pressure   110 mmHg                             Diastolic Blood Pressure  73 mmHg                             Mean Arterial Pressure, Cuff              85 mmHg    5/17/2020 16:00 CDT      Temperature Oral          36.6 DegC                             Temperature Oral (calculated)             97.88 DegF                             Peripheral Pulse Rate     97 bpm                             SpO2                      97 %                             Oxygen Therapy            Room air                             Systolic Blood Pressure   122 mmHg                             Diastolic Blood Pressure  84 mmHg                             Mean Arterial Pressure, Cuff              97 mmHg    5/17/2020 15:00 CDT      SpO2                      96 %                             Oxygen Therapy            Room air    5/17/2020 14:28 CDT      Peripheral Pulse Rate     95 bpm                              Respiratory Rate          20 br/min                             SpO2                      96 %                             Oxygen Therapy            Room air                             Systolic Blood Pressure   124 mmHg                             Diastolic Blood Pressure  80 mmHg    5/17/2020 14:01 CDT      Peripheral Pulse Rate     99 bpm                             Respiratory Rate          20 br/min                             SpO2                      95 %                             Systolic Blood Pressure   101 mmHg                             Diastolic Blood Pressure  66 mmHg    5/17/2020 11:07 CDT      SpO2                      98 %                             Oxygen Therapy            Room air    5/17/2020 10:53 CDT      Temperature Oral          36.8 DegC                             Temperature Oral (calculated)             98.24 DegF                             Peripheral Pulse Rate     97 bpm                             Respiratory Rate          20 br/min                             SpO2                      98 %                             Oxygen Therapy            Room air                             Systolic Blood Pressure   116 mmHg                             Diastolic Blood Pressure  86 mmHg        Vital Signs (last 24 hrs)_____  Last Charted___________  Temp Oral     36.5 DegC  (MAY 18 07:00)  Heart Rate Peripheral   86 bpm  (MAY 18 07:00)  SBP      116 mmHg  (MAY 18 07:00)  DBP      74 mmHg  (MAY 18 07:00)  SpO2      98 %  (MAY 18 08:18)  Weight      69.9 kg  (MAY 18 06:02)  Height      161 cm  (MAY 17 16:50)  BMI      26.62  (MAY 17 16:50)       General: well-developed well-nourished in no acute distress  Eye: PERRLA, EOMI, clear conjunctiva, eyelids normal  HENT: Oropharynx without erythema/exudate, oropharynx and nasal mucosal surfaces moist  Neck: No thyromegaly or lymphadenopathy  Respiratory: clear to auscultation bilaterally  Cardiovascular: regular rate and rhythm without murmurs,  gallops or rubs  Gastrointestinal: soft, non-tender, non-distended with normal bowel sounds, without masses to palpation  Integumentary: no rashes or skin lesions present  Neurologic: cranial nerves grossly intact, no signs of peripheral neurological deficit  Psych: Appropriate affect, not anxious or depressed      Impression and Plan     Diabetes possibly secondary to chemotherapy agent with marked hyperglycemia  -Start Lantus 10 units nightly  -CBG with sliding scale  -IV hydration  -Likely should be ready for discharge to home tomorrow on Lantus    Acute kidney injury  -Improved with IV fluid  -Recheck renal function in the morning    Restless leg syndrome  -Increase Requip from 0.5 mg nightly to twice daily    NIKI on CPAP, hypertension, vitamin B12 deficiency, hypothyroidism  -All stable    VTE prophylaxis with Lovenox  CODE STATUS is full code

## 2022-04-30 NOTE — PROGRESS NOTES
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 2017 Tripped over cat injuring right posterior chest. CXR reveals right lung mass.   2. 10/6/2017 CT the chest with contrast 6.9 by 3.6 by 3.1 cm right middle lobe mass invading the right middle lobe bronchus, 2mm nodule left apex   3. 10/19/2017 Bronchoscopy, EBUS with 4R biopsy demonstrating squamous cell carcinoma. 4R lymph node negative.   4.10/26/2017 PET CT right middle lobe lung mass measuring 6.8cm with SUV 18.8 extending into the right hilum. Right 11th rib with a displaced fracture SUV 4.1. Soft tissue throughout left maxillary sinus   with extension into left nasal cavity  5. MRI of the Brain is negative for intracranial metastases but showed a left maxillary sinus mass extending in to the left nasal cavity.   6. 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 This needs to be removed since there is a small chance this could be harboring an invasive malignancy   after the definitive treatments for her lung cancer.   7. An EBUS done outside was inconclusive.   8. EBUS done  [facility] was Negative for N2 disease.    The final stage is T4N0M0 (Stage III) Squamous cell Carcinoma of the lung. Plan is to do induction chemotherapy followed by surgery. The patient did not qualify for the cisplatin, docetaxel and   Nintedanib secondary to the central nature of the tumor and the presence of vascular invasion.   9. 2017 - 2018 Chemotherapy/3 cycles of  carboplatin and Abraxane  10. Completed definitive radiation therapy to the Chest 05/23/18.   11. 10/25/19. Newly noted bilateral pulmonary nodules Biopsy confirms squamous cell carcinoma.   12. 11/2019. The patient has NFE2L2 mutation and thus enrolled on the glutaminase inhibitor trial run by Dr. Bertrand Smith.   13. Right lung has progressed after 2 cycles of treatment with [id number] (Glutimase inhibitor).   14. PD-L1 80%. Initiated on single agent Pembrolizumab on 02/07/2020 --3/2020  - Restaging s/p c#2 showed good treatment response, how ever developed DKA requiring ICU admission and continuation of insulin.    Abraxane X 2 cycles (4/22/20)--- 7/15/2020:  15. 6/2/20: Repeat CTs showed good response to treatment  16. CT C/A/P/ brain: July 31, 2020: CT the brain no evidence of abnormality, CT of the chest abdomen and pelvis, compared to outside CT of the chest in June, and abdomen pelvis in March,  New superior left lower lobe 1 cm spiculated nodule, progressive subcarinal lymphadenopathy 3.2 x 1.4 cm compared to 2.3 x 1.2 cm, unchanged right hepatic cyst, hypodensity lesion in the liver,  Changed adrenal nodule,  Abraxane q 3 weeks (4/22/20)----7/15/2020:  17.  MRI of the brain September 11, 2020, no evidence of cranial metastases  18.  PET/CT 9/4/2020: 2 new pulmonary nodes seen on the 7/31/2020 chest CT mildly smaller today do not have increased FDG activity, although the small size does limit PET sensitivity, probe to borderline enlarged anterior paratracheal and gastropathic lymph nodes are mild hypermetabolism.  Nonspecific.         Interval History   Last had a telemedicine note on August 5, 2020, she was seen by MD Mcnamara and agree that her most recent CT on 7/29/2020 showed progressive disease.  They recommended she have an MRI of the brain PET/CT, and start on gemcitabine thousand grams per meter squared on day 1/8/2015 of a 28-day cycle.      Review of Systems   Constitutional:  Weakness, Decreased  activity, No chills, No sweats, No fatigue.    Eye:  No blurring, No double vision.    Ear/Nose/Mouth/Throat:  No nasal congestion, No sore throat.    Respiratory:  No shortness of breath, No cough.    Cardiovascular:  No chest pain, No palpitations.    Gastrointestinal:  No nausea, No vomiting, No diarrhea, No constipation.    Genitourinary:  No dysuria, No hematuria.    Immunologic:  Chemotherapy.    Musculoskeletal:  No joint pain, No muscle pain.    Integumentary:  No rash, No pruritus.    Neurologic:  Alert and oriented X4, No abnormal balance, No headache.    Psychiatric:  Negative.       Health Status   Allergies:    Allergic Reactions (Selected)  Severity Not Documented  Iodine- Rash, swelling and throat swells.  Latex- Rash.  Norco- Dillusions, insomnia and itching.  Phenergan- Insomnia.  Shellfish- Rash, throat closes and face swells.  Tape- Red rash and gang green.,    Allergies (6) Active Reaction  iodine throat swells  Latex Rash  Norco insomnia  Phenergan insomnia  shellfish rash  Tape red rash     Current medications:  (Selected)   Inpatient Medications  Ordered  DuoNeb 0.5 mg-2.5 mg/3 mL inhalation solution: 3 mL, NEB, Once  Heparin Flush 100 U/mL - 5 mL: 500 units, 5 mL, IV Push, Once-chemo, Days 1  Versed: 1 mg, 1 mL, IV Push, Once  Future  Aloxi (for IVPB): 0.25 mg, 5 mL, 150 mL/hr, IV Piggyback, Once-chemo, Days 8  Gemzar (for IVPB): 1,672 mg, 43.97 mL, 588 mL/hr, IV Piggyback, Once-chemo, Days 8  Heparin Flush 100 U/mL - 5 mL: 500 units, 5 mL, IV Push, Once-chemo, Days 8  dexamethasone (for IVPB): 10 mg, 1 mL, 153 mL/hr, IV Piggyback, Once-chemo, Days 8  Prescriptions  Prescribed  Lantus Solostar Pen 100 units/mL subcutaneous solution: 20 units, Subcutaneous, Daily, 15 mL, 3 Refill(s)  Nb Magnesium 500mg Tab 200: See Instructions, take 1 tablet by mouth once a day. stop taking the 400mg tablets, 30 EA, 5 Refill(s)  True Matrix Glucose Machine, Test Strips & Lancets: See Instructions, Patient to  test FBG in AM, random at noon ana, at dinner time and before bed.    Patient needs a True Matrix Glucose Monitor as well as the    true matrix test strips and lancets.    DX E11.9, 1 kit(s), 0 Refill(s)  Trulicity Pen 1.5 mg/0.5 mL subcutaneous solution: 1.5 mg, 0.5 mL, Subcutaneous, qWeek, 2.5 mL, 11 Refill(s)  Tylenol with Codeine #3 oral tablet: See Instructions, 1 tab(s) Oral q6h as needed  for 3 days following chemotherapy, 20 tab(s), 0 Refill(s)  Vitamin D3 1000 intl units oral tablet: 2,000 IntUnit, 2 tab(s), Oral, Daily, 60 tab(s), 0 Refill(s)  Zofran 8 mg oral tablet: See Instructions, 1 tab(s) Oral TID prn N+V, 30 tab(s), 0 Refill(s)  metFORMIN 1000 mg oral tablet: 1,000 mg, 1 tab(s), Oral, BID, 180 tab(s), 1 Refill(s)  potassium chloride 10 mEq oral CAPSULE extended release: 10 mEq, 1 cap(s), Oral, Daily, 30 cap(s), 1 Refill(s)  ropinirole 0.5 mg oral tablet: See Instructions, TAKE ONE TABLET BY MOUTH 1 -3 hours before bedtime, 30 tab(s), 5 Refill(s)  temazepam 30 mg oral capsule: 30 mg, 1 cap(s), Oral, Once a day (at bedtime), PRN: for sleep, 30 cap(s), 0 Refill(s)  traMADol 50 mg oral tablet: 50 mg, 1 tab(s), Oral, q6hr, PRN: pain, mild, 30 tab(s), 0 Refill(s)  Documented Medications  Documented  Aciphex 20 mg oral EC tablet (pt. own): 20 mg, 1 tab(s), Oral, Daily, 0 Refill(s)  BuSpar 7.5 mg oral tablet: 1 tab(s), Oral, TID, 90 tab(s), 0 Refill(s)  Bystolic 10 mg oral tablet: 10 mg, 1 tab(s), Oral, Daily, 30 tab(s)  Centrum Women's oral tablet: 1 tab(s), Oral, Daily, 0 Refill(s)  Lubricant Eye Drops ophthalmic solution: 1 drop(s), Eye-Both, BID, PRN: as needed for dry eyes, 30 EA, 0 Refill(s)  NovoLIN R FlexPen 100 units/mL injectable solution: Subcutaneous, As Directed  Symbicort 160 mcg-4.5 mcg/inh inhalation aerosol: 2 puff(s), INH, BID, PRN: wheezing and sob, 0 Refill(s)  Vitamin B12 Injection -CCA: qMonth  alPRAzolam 0.5 mg oral tablet: 0.5 mg, 1 tab(s), Oral, At Bedtime, PRN: for  anxiety  albuterol-ipratropium 2.5 mg-0.5 mg/3 mL inhalation solution: 3 mL, NEB, q6hr, PRN: sob or wheezing  amlodipine-benazepril 10 mg-20 mg oral capsule: 1 cap(s), Oral, Daily  meloxicam 15 mg oral tablet: 15 mg, 1 tab(s), Oral, Daily, 0 Refill(s)  mirtazapine 15 mg oral tablet: 15 mg, 1 tab(s), Oral, Once a day (at bedtime), 0 Refill(s)  montelukast 10 mg oral TABLET: 10 mg, 1 tab(s), Oral, Daily, 0 Refill(s)   Problem list:    All Problems  Acid reflux / 4136788633 / Confirmed  Anemia / 140121083 / Confirmed  Asthma / 054693804 / Confirmed  Vitamin B12 deficiency / 383358724 / Confirmed  Low vitamin D level / 585563465 / Confirmed  History of gastric bypass / 3145821720 / Confirmed  Hypertension / 08674437 / Confirmed  Depression with anxiety / 898802985 / Confirmed  NIKI on CPAP / [id number] / Confirmed  Stage IV squamous cell carcinoma of lung / 492453562 / Confirmed  Diabetes mellitus type 2 in nonobese / 0882810972 / Confirmed  Canceled: Vitamin B12 deficiency / 447001676  Canceled: Abnormal chest CT / 8682186365  Canceled: Family history of lung cancer / 8831886229  Canceled: Heart disease / 41003890  Canceled: Routine health maintenance / 271258235  Canceled: Sleep apnea / 579460681,    Active Problems (11)  Acid reflux   Anemia   Asthma   Depression with anxiety   Diabetes mellitus type 2 in nonobese   History of gastric bypass   Hypertension   Low vitamin D level   NIKI on CPAP   Stage IV squamous cell carcinoma of lung   Vitamin B12 deficiency         Histories   Past Medical History:    No active or resolved past medical history items have been selected or recorded.   Family History:    No family history items have been selected or recorded.   Procedure history:    Mammogram (738939943) on 1/1/2019 at 63 Years.  Bronchoscopy w/ Needle Aspir Biopsy(s) on 10/19/2017 at 62 Years.  Comments:  10/19/2017 13:24 LAZARO - Chasity Barrios RRT  auto-populated from documented surgical case  Bronchoscopy w/  Endobronch Biopsy(s) on 10/19/2017 at 62 Years.  Comments:  10/19/2017 13:24 Chasity Mistry RRT  auto-populated from documented surgical case  Bronchoscopy with Brushings on 10/19/2017 at 62 Years.  Comments:  10/19/2017 13:24 Chasity Mistry RRT  auto-populated from documented surgical case  Bronchoscopy, Ebus, 2 or Less Samples on 10/19/2017 at 62 Years.  Comments:  10/19/2017 13:24 Chasity Mistry RRT  auto-populated from documented surgical case  gastric sleeve and bypass.  cheryl fundiplication.  bilatteral bunnionectomy.  bilatteral carpel tunnel repair.  lasix eye surgery.  partial hysterectomy.  cholecysectomy.  tonsillectomy.  hemmorhedectomy.   Social History        Social & Psychosocial Habits    Alcohol  02/06/2015 Risk Assessment: Denies Alcohol Use    10/10/2017  Use: Current    Type: Liquor    Frequency: Daily    04/06/2020  Use: Past    Type: Liquor    Frequency: 1-2 times per week    05/17/2020  Use: Past    Substance Use  02/06/2015 Risk Assessment: Denies Substance Abuse    Tobacco  02/06/2015 Risk Assessment: Denies Tobacco Use    09/08/2020  Use: Former smoker, quit more    Patient Wants Consult For Cessation Counseling No    Abuse/Neglect  09/08/2020  SHX Any signs of abuse or neglect No    Spiritual/Cultural  07/15/2020  Mormon Preference Caodaism      07/15/2020  Branch of  Never in   .        Physical Examination   Vital Signs   9/15/2020 10:29 CDT      Temperature Oral          36.5 DegC                             Temperature Oral (calculated)             97.70 DegF                             Peripheral Pulse Rate     91 bpm                             SpO2                      98 %                             Oxygen Therapy            Room air                             Systolic Blood Pressure   107 mmHg                             Diastolic Blood Pressure  77 mmHg     Measurements from flowsheet : Measurements   9/15/2020 10:29 CDT       Weight Dosing             61.900 kg                             Weight Measured           61.9 kg                             Weight Measured and Calculated in Lbs     136.46 lb                             Height/Length Dosing      161.00 cm                             Height/Length Measured    161 cm                             BSA Measured              1.66 m2                             Body Mass Index Measured  23.88 kg/m2     General:  Alert and oriented, No acute distress.    Eye:  Pupils are equal, round and reactive to light.    HENT:  Normocephalic, Normal hearing, Oral mucosa is moist, No pharyngeal erythema.    Neck:  Supple.    Respiratory:  Lungs are clear to auscultation.    Cardiovascular:  Normal rate, Regular rhythm, No gallop, No edema.    Gastrointestinal:  Soft, Non-tender, Non-distended, Normal bowel sounds.    Lymphatics:  No lymphadenopathy neck, axilla, groin.    Musculoskeletal:  Normal range of motion.    Integumentary:  Warm.    Neurologic:  Alert, Oriented.    Cognition and Speech:  Oriented, Speech clear and coherent.    Psychiatric:  Cooperative, Appropriate mood & affect.    ECOG Performance Scale: 0 - Fully active; no performance restrictions.      Review / Management   Results review   Laboratory Results   Last 5 Days Lab Results : PowerNote Discrete Results   9/15/2020 10:16 CDT      WBC                       2.9 x10(3)/mcL  LOW                             RBC                       3.59 x10(6)/mcL  LOW                             Hgb                       11.0 gm/dL  LOW                             Hct                       34.8 %  LOW                             Platelet                  206 x10(3)/mcL                             MCV                       96.9 fL  HI                             MCH                       30.6 pg                             MCHC                      31.6 gm/dL  LOW                             RDW                       13.0 %                              MPV                       8.9 fL  LOW                             Abs Neut                  1.65 x10(3)/mcL  LOW                             NEUT%                     56.9 %  NA                             NEUT#                     1.6 x10(3)/mcL  LOW                             LYMPH%                    30.0 %  NA                             LYMPH#                    0.9 x10(3)/mcL                             MONO%                     5.9 %  NA                             MONO#                     0.2 x10(3)/mcL                             EOS%                      6.2 %  NA                             EOS#                      0.2 x10(3)/mcL                             BASO%                     1.0 %  NA                             BASO#                     0.0 x10(3)/mcL        Response to Treatment:  Stable disease.       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 PD      PLAN:  Repeat CT (6/2/20) showed bilateral pulmonary metastasis decreased in size.  Stable taylor metastasis.  Stable post therapeutic changes in the right lung.     She  was going back to [facility] on 7/22 with repeat imaging  --7/28/20-new lesion--New superior left lower lobe 1 cm spiculated nodule, progressive subcarinal lymphadenopathy 3.2 x 1.4 cm  [facility]--Change to Gemzar--get PET and MRI         MRI negative--PET with smaller size lesion to Ct 7/28                   data reviewed with -Tylenol with codeine for 3 days following chemotherapy--- refilled LAST VISIT, x1 and if this continues CONSIDER  change to  gabapentin, and d/c Tylenol #3                NOW THAT WE ARE off Abraxane,  hopefully she will not need the Tylenol 3.             will keep on Gemzar for know               if ? change will biopsy with smaller size and Low uptake         Tho Kirby MD      Other Physicians   [facility] team

## 2022-04-30 NOTE — PROGRESS NOTES
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 CXR reveals right lung mass.   10/6/2017 CT the chest with contrast 6.9 by 3.6 by 3.1 cm right middle lobe mass invading the right middle lobe bronchus, 2mm nodule left apex   10/19/2017 Bronchoscopy, EBUS with 4R biopsy demonstrating squamous cell carcinoma. 4R lymph node negative.   10/26/2017 PET CT right middle lobe lung mass measuring 6.8cm with SUV 18.8 extending into the right hilum. Right 11th rib with a displaced fracture SUV 4.1. Soft tissue throughout left   maxillary sinus with extension into left nasal cavity   MRI of the Brain is negative for intracranial metastases but showed a left maxillary sinus mass extending in to the left nasal cavity.    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 This needs to be removed since there is a small chance this could be harboring an invasive   malignancy after the definitive treatments for her lung cancer.    An EBUS done outside was inconclusive.    EBUS done  [facility] was Negative for N2 disease.    The final stage is T4N0M0 (Stage III) Squamous cell Carcinoma of the lung. Plan is to do induction chemotherapy followed by surgery. The patient did not qualify for the cisplatin, docetaxel   and Nintedanib secondary to  the central nature of the tumor and the presence of vascular invasion.   5/2017 - 2/6/2018 Chemotherapy/3 cycles of carboplatin and Abraxane  5/23/2018: Completed definitive radiation therapy to the Chest 05/23/18.   10/25/19. Newly noted bilateral pulmonary nodules Biopsy confirms squamous cell carcinoma.   11/2019. The patient has NFE2L2 mutation and thus enrolled on the glutaminase inhibitor trial run by Dr. Bertrand Smith.    Right lung has progressed after 2 cycles of treatment with [id number] (Glutimase inhibitor).   PD-L1 80%. Initiated on single agent Pembrolizumab on 02/07/2020 --3/2020  - Restaging s/p c#2 showed good treatment response, how ever developed DKA requiring ICU admission and continuation of insulin.    Abraxane X 2 cycles (4/22/20)--- 7/15/2020:   6/2/20: Repeat CTs showed good response to treatment  7/31/20: CT C/A/P/ brain:CT the brain no evidence of abnormality, CT of the chest abdomen and pelvis, compared to outside CT of the chest in June, and abdomen pelvis in March,   New superior left lower lobe 1 cm spiculated nodule, progressive subcarinal lymphadenopathy 3.2 x 1.4 cm compared to 2.3 x 1.2 cm, unchanged right hepatic cyst, hypodensity lesion in the   liver. Changed adrenal nodule,  4/22/20-7/15/20: Abraxane q 3 weeks  9/11/20: MRI of the brain- no evidence of cranial metastases  9/4/20:PET/CT- 2 new pulmonary nodes seen on the 7/31/2020 chest CT mildly smaller today do not have increased FDG activity, although the small size does limit PET sensitivity, probe to   borderline enlarged anterior paratracheal and gastropathic lymph nodes are mild hypermetabolism.  Nonspecific.  9/8/20: Gemzar 1000 mg/m² day 1, day 8 q 3 weeks      Interval History   Patient presents for treatment visit, scheduled for C2D1. She is doing well and tolerating treatment without toxicities.       Review of Systems   Constitutional:  Weakness, Decreased activity, No chills, No sweats, No fatigue.    Eye:  No blurring, No  double vision.    Ear/Nose/Mouth/Throat:  No nasal congestion, No sore throat.    Respiratory:  No shortness of breath, No cough.    Cardiovascular:  No chest pain, No palpitations.    Gastrointestinal:  No nausea, No vomiting, No diarrhea, No constipation.    Genitourinary:  No dysuria, No hematuria.    Immunologic:  Chemotherapy.    Musculoskeletal:  No joint pain, No muscle pain.    Integumentary:  No rash, No pruritus.    Neurologic:  Alert and oriented X4, No abnormal balance, No headache.    Psychiatric:  Negative.       Health Status   Allergies:    Allergic Reactions (Selected)  Severity Not Documented  Iodine- Rash, swelling and throat swells.  Latex- Rash.  Norco- Dillusions, insomnia and itching.  Phenergan- Insomnia.  Shellfish- Rash, throat closes and face swells.  Tape- Red rash and gang green.,    Allergies (6) Active Reaction  iodine throat swells  Latex Rash  Norco insomnia  Phenergan insomnia  shellfish rash  Tape red rash     Current medications:  (Selected)   Outpatient Medications  Ordered  DuoNeb 0.5 mg-2.5 mg/3 mL inhalation solution: 3 mL, NEB, Once  Heparin Flush 100 U/mL - 5 mL: 500 units, 5 mL, IV Push, Once-chemo, Days 1  Versed: 1 mg, 1 mL, IV Push, Once  Future  Aloxi (for IVPB): 0.25 mg, 5 mL, 150 mL/hr, IV Piggyback, Once-chemo, Days 15  Aloxi (for IVPB): 0.25 mg, 5 mL, 150 mL/hr, IV Piggyback, Once-chemo, Days 8  Gemzar (for IVPB): 1,672 mg, 43.97 mL, 500 mL/hr, IV Piggyback, Once-chemo, Days 15  Gemzar (for IVPB): 1,672 mg, 43.97 mL, 500 mL/hr, IV Piggyback, Once-chemo, Days 8  Heparin Flush 100 U/mL - 5 mL: 500 units, 5 mL, IV Push, Once-chemo, Days 15  Heparin Flush 100 U/mL - 5 mL: 500 units, 5 mL, IV Push, Once-chemo, Days 8  dexamethasone (for IVPB): 10 mg, 1 mL, 153 mL/hr, IV Piggyback, Once-chemo, Days 15  dexamethasone (for IVPB): 10 mg, 1 mL, 153 mL/hr, IV Piggyback, Once-chemo, Days 8  Prescriptions  Prescribed  Lantus Solostar Pen 100 units/mL subcutaneous solution: 20  units, Subcutaneous, Daily, 15 mL, 3 Refill(s)  Nb Magnesium 500mg Tab 200: See Instructions, take 1 tablet by mouth once a day. stop taking the 400mg tablets, 30 EA, 5 Refill(s)  True Matrix Glucose Machine, Test Strips & Lancets: See Instructions, Patient to test FBG in AM, random at noon ana, at dinner time and before bed.    Patient needs a True Matrix Glucose Monitor as well as the    true matrix test strips and lancets.    DX E11.9, 1 kit(s), 0 Refill(s)  Trulicity Pen 1.5 mg/0.5 mL subcutaneous solution: 1.5 mg, 0.5 mL, Subcutaneous, qWeek, 2.5 mL, 11 Refill(s)  Tylenol with Codeine #3 oral tablet: See Instructions, 1 tab(s) Oral q6h as needed  for 3 days following chemotherapy, 20 tab(s), 0 Refill(s)  Vitamin D3 1000 intl units oral tablet: 2,000 IntUnit, 2 tab(s), Oral, Daily, 60 tab(s), 0 Refill(s)  Zofran 8 mg oral tablet: See Instructions, 1 tab(s) Oral TID prn N+V, 30 tab(s), 0 Refill(s)  metFORMIN 1000 mg oral tablet: 1,000 mg, 1 tab(s), Oral, BID, 180 tab(s), 1 Refill(s)  potassium chloride 10 mEq oral CAPSULE extended release: 10 mEq, 1 cap(s), Oral, Daily, 30 cap(s), 1 Refill(s)  ropinirole 0.5 mg oral tablet: See Instructions, TAKE ONE TABLET BY MOUTH 1 -3 hours before bedtime, 30 tab(s), 5 Refill(s)  temazepam 30 mg oral capsule: 30 mg, 1 cap(s), Oral, Once a day (at bedtime), PRN: for sleep, 30 cap(s), 0 Refill(s)  traMADol 50 mg oral tablet: 50 mg, 1 tab(s), Oral, q6hr, PRN: pain, mild, 30 tab(s), 0 Refill(s)  Documented Medications  Documented  Aciphex 20 mg oral EC tablet (pt. own): 20 mg, 1 tab(s), Oral, Daily, 0 Refill(s)  BuSpar 7.5 mg oral tablet: 1 tab(s), Oral, TID, 90 tab(s), 0 Refill(s)  Bystolic 10 mg oral tablet: 10 mg, 1 tab(s), Oral, Daily, 30 tab(s)  Centrum Women's oral tablet: 1 tab(s), Oral, Daily, 0 Refill(s)  Lubricant Eye Drops ophthalmic solution: 1 drop(s), Eye-Both, BID, PRN: as needed for dry eyes, 30 EA, 0 Refill(s)  NovoLIN R FlexPen 100 units/mL injectable solution:  Subcutaneous, As Directed  Symbicort 160 mcg-4.5 mcg/inh inhalation aerosol: 2 puff(s), INH, BID, PRN: wheezing and sob, 0 Refill(s)  Vitamin B12 Injection -CCA: qMonth  alPRAzolam 0.5 mg oral tablet: 0.5 mg, 1 tab(s), Oral, At Bedtime, PRN: for anxiety  albuterol-ipratropium 2.5 mg-0.5 mg/3 mL inhalation solution: 3 mL, NEB, q6hr, PRN: sob or wheezing  amlodipine-benazepril 10 mg-20 mg oral capsule: 1 cap(s), Oral, Daily  meloxicam 15 mg oral tablet: 15 mg, 1 tab(s), Oral, Daily, 0 Refill(s)  mirtazapine 15 mg oral tablet: 15 mg, 1 tab(s), Oral, Once a day (at bedtime), 0 Refill(s)  montelukast 10 mg oral TABLET: 10 mg, 1 tab(s), Oral, Daily, 0 Refill(s)   Problem list:    All Problems  Acid reflux / 1830813050 / Confirmed  Anemia / 993187746 / Confirmed  Asthma / 923107155 / Confirmed  Vitamin B12 deficiency / 683762445 / Confirmed  Low vitamin D level / 819267219 / Confirmed  History of gastric bypass / 9575544538 / Confirmed  Hypertension / 91637852 / Confirmed  Depression with anxiety / 937397509 / Confirmed  NIKI on CPAP / 403082044 / Confirmed  Stage IV squamous cell carcinoma of lung / 255773502 / Confirmed  Diabetes mellitus type 2 in nonobese / 9417872334 / Confirmed  Canceled: Vitamin B12 deficiency / 041810006  Canceled: Abnormal chest CT / 2599049110  Canceled: Family history of lung cancer / 7772560026  Canceled: Heart disease / 60493227  Canceled: Routine health maintenance / 111781976  Canceled: Sleep apnea / 518701245,    Active Problems (11)  Acid reflux   Anemia   Asthma   Depression with anxiety   Diabetes mellitus type 2 in nonobese   History of gastric bypass   Hypertension   Low vitamin D level   NIKI on CPAP   Stage IV squamous cell carcinoma of lung   Vitamin B12 deficiency         Histories   Past Medical History:    No active or resolved past medical history items have been selected or recorded.   Family History:    No family history items have been selected or recorded.   Procedure history:     Mammogram ([id number]) on 1/1/2019 at 63 Years.  Bronchoscopy w/ Needle Aspir Biopsy(s) on 10/19/2017 at 62 Years.  Comments:  10/19/2017 13:24 Chasity Mistry RRT.  auto-populated from documented surgical case  Bronchoscopy w/ Endobronch Biopsy(s) on 10/19/2017 at 62 Years.  Comments:  10/19/2017 13:24 Chasity Mistry RRT  auto-populated from documented surgical case  Bronchoscopy with Brushings on 10/19/2017 at 62 Years.  Comments:  10/19/2017 13:24 Chasity Mistry RRT  auto-populated from documented surgical case  Bronchoscopy, Ebus, 2 or Less Samples on 10/19/2017 at 62 Years.  Comments:  10/19/2017 13:24 Chasity Mistry RRT.  auto-populated from documented surgical case  gastric sleeve and bypass.  cheryl fundiplication.  bilatteral bunnionectomy.  bilatteral carpel tunnel repair.  lasix eye surgery.  partial hysterectomy.  cholecysectomy.  tonsillectomy.  hemmorhedectomy.   Social History        Social & Psychosocial Habits    Alcohol  02/06/2015 Risk Assessment: Denies Alcohol Use    10/10/2017  Use: Current    Type: Liquor    Frequency: Daily    04/06/2020  Use: Past    Type: Liquor    Frequency: 1-2 times per week    05/17/2020  Use: Past    Substance Use  02/06/2015 Risk Assessment: Denies Substance Abuse    Tobacco  02/06/2015 Risk Assessment: Denies Tobacco Use    09/29/2020  Use: Former smoker, quit more    Patient Wants Consult For Cessation Counseling No    Abuse/Neglect  09/29/2020  SHX Any signs of abuse or neglect No    Spiritual/Cultural  07/15/2020  Hindu Preference Faith      07/15/2020  Branch of  Never in   .        Physical Examination   Vital Signs   9/29/2020 9:12 CDT       Temperature Oral          36.4 DegC                             Temperature Oral (calculated)             97.52 DegF                             Peripheral Pulse Rate     96 bpm                             SpO2                      97 %                              Oxygen Therapy            Room air                             Systolic Blood Pressure   130 mmHg                             Diastolic Blood Pressure  92 mmHg  HI                             Blood Pressure Location   Left arm                             Manual Cuff BP            No     General:  Alert and oriented, No acute distress.    Eye:  Pupils are equal, round and reactive to light.    HENT:  Normocephalic, Normal hearing, Oral mucosa is moist, No pharyngeal erythema.    Neck:  Supple, Non-tender, No lymphadenopathy.    Respiratory:  Lungs are clear to auscultation, Respirations are non-labored, Symmetrical chest wall expansion.    Cardiovascular:  Normal rate, Regular rhythm, No gallop, No edema.    Gastrointestinal:  Soft, Non-tender, Non-distended, Normal bowel sounds.    Lymphatics:  No lymphadenopathy neck, axilla, groin.    Musculoskeletal:  Normal range of motion.    Integumentary:  Warm.    Neurologic:  Alert, Oriented.    Cognition and Speech:  Oriented, Speech clear and coherent.    Psychiatric:  Cooperative, Appropriate mood & affect.    ECOG Performance Scale: 0 - Fully active; no performance restrictions.      Review / Management   Results review   Laboratory Results   Today's Lab Results : PowerNote Discrete Results   9/29/2020 9:15 CDT       POC TCO2                  27.0 mmol/L                             POC Hb                    12.9 mg/dL                             POC Hct                   38.0 %                             POC Sodium                139 mmol/L                             POC Potassium             3.9 mmol/L                             POC Chloride              100 mmol/L                             POC Ion Calcium           1.15 mmol/L                             POC Glucose               260 mg/dL  HI                             POC BUN                   13.0 mg/dL                             POC Creatinine            0.7 mg/dL                             POC AGAP                   17.0  NA    9/29/2020 8:55 CDT       WBC                       4.9 x10(3)/mcL                             RBC                       3.70 x10(6)/mcL  LOW                             Hgb                       11.4 gm/dL  LOW                             Hct                       36.0 %  LOW                             Platelet                  380 x10(3)/mcL                             MCV                       97.3 fL  HI                             MCH                       30.8 pg                             MCHC                      31.7 gm/dL  LOW                             RDW                       14.1 %                             MPV                       9.1 fL  LOW                             Abs Neut                  3.02 x10(3)/mcL                             NEUT%                     61.9 %  NA                             NEUT#                     3.0 x10(3)/mcL                             LYMPH%                    24.2 %  NA                             LYMPH#                    1.2 x10(3)/mcL                             MONO%                     8.4 %  NA                             MONO#                     0.4 x10(3)/mcL                             EOS%                      4.7 %  NA                             EOS#                      0.2 x10(3)/mcL                             BASO%                     0.6 %  NA                             BASO#                     0.0 x10(3)/mcL        Response to Treatment:  Stable disease.       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                Other Physicians   [facility] team

## 2022-04-30 NOTE — PROGRESS NOTES
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 2017 Tripped over cat injuring right posterior chest. CXR reveals right lung mass.   2. 10/6/2017 CT the chest with contrast 6.9 by 3.6 by 3.1 cm right middle lobe mass invading the right middle lobe bronchus, 2mm nodule left apex   3. 10/19/2017 Bronchoscopy, EBUS with 4R biopsy demonstrating squamous cell carcinoma. 4R lymph node negative.   4.10/26/2017 PET CT right middle lobe lung mass measuring 6.8cm with SUV 18.8 extending into the right hilum. Right 11th rib with a displaced fracture SUV 4.1. Soft tissue throughout left maxillary sinus   with extension into left nasal cavity  5. MRI of the Brain is negative for intracranial metastases but showed a left maxillary sinus mass extending in to the left nasal cavity.   6. 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 This needs to be removed since there is a small chance this could be harboring an invasive malignancy   after the definitive treatments for her lung cancer.   7. An EBUS done outside was inconclusive.   8. EBUS done  [facility] was Negative for N2 disease.    The final stage is T4N0M0 (Stage III) Squamous cell Carcinoma of the lung. Plan is to do induction chemotherapy followed by surgery. The patient did not qualify for the cisplatin, docetaxel and   Nintedanib secondary to the central nature of the tumor and the presence of vascular invasion.   9. 2017 - 2018 Chemotherapy/3 cycles of carboplatin and Abraxane  10. Completed definitive radiation therapy to  the Chest 05/23/18.   11. 10/25/19. Newly noted bilateral pulmonary nodules Biopsy confirms squamous cell carcinoma.   12. 11/2019. The patient has NFE2L2 mutation and thus enrolled on the glutaminase inhibitor trial run by Dr. Bertrand Smith.   13. Right lung has progressed after 2 cycles of treatment with [id number] (Glutimase inhibitor).   14. PD-L1 80%. Initiated on single agent Pembrolizumab on 02/07/2020 --3/2020  - Restaging s/p c#2 showed good treatment response, how ever developed DKA requiring ICU admission and continuation of insulin.    Abraxane X 2 cycles (4/22/20)  6/2/20: Repeat CTs showed good response to treatment         Chief Complaint   6/3/2020 10:24 CDT       Letter for her insurance- Eyesight not great- has appointment to get them checked.        Interval History   Patient was seen on 6/2/2020 at [facility] via telemedicine visit.  Repeat imaging after 2 cycles of single agent Abraxane showed good treatment response. They recommend continue with Abraxane. With her wild swings in blood sugar will discontinue using steroids.  She is tolerating ok, other than several days of fatigue. She has no N/V/D.       Review of Systems   Constitutional:  Fatigue, No fever, No chills.    Eye:  Blurring, No double vision.    Ear/Nose/Mouth/Throat:  No nasal congestion, No sore throat.    Respiratory:  No shortness of breath, No cough.    Cardiovascular:  No chest pain, No palpitations.    Gastrointestinal:  No nausea, No vomiting, No diarrhea, No constipation.    Immunologic:  Chemotherapy.    Musculoskeletal:  Joint pain.    Integumentary:  No rash, No pruritus.    Neurologic:  Alert and oriented X4, No headache.       Health Status   Allergies:    Allergic Reactions (Selected)  Severity Not Documented  Iodine- Rash, swelling and throat swells.  Latex- Rash.  Norco- Dillusions, insomnia and itching.  Phenergan- Insomnia.  Shellfish- Rash, throat closes and face swells.  Tape- Red rash and gang green.,    Allergies  (6) Active Reaction  iodine throat swells  Latex Rash  Norco insomnia  Phenergan insomnia  shellfish rash  Tape red rash     Current medications:  (Selected)   Inpatient Medications  Ordered  DuoNeb 0.5 mg-2.5 mg/3 mL inhalation solution: 3 mL, NEB, Once  Versed: 1 mg, 1 mL, IV Push, Once  Prescriptions  Prescribed  Lantus Solostar Pen 100 units/mL subcutaneous solution: 20 units, Subcutaneous, Daily, 15 mL, 3 Refill(s)  Levemir FlexPen 100 units/mL subcutaneous solution: 20 units, Subcutaneous, Once a day (at bedtime), for 90 day(s), 15 mL, 1 Refill(s)  Trulicity Pen 1.5 mg/0.5 mL subcutaneous solution: 1.5 mg, 0.5 mL, Subcutaneous, qWeek, 2.5 mL, 11 Refill(s)  Zofran 4 mg oral tablet: 4 mg, 1 tab(s), Oral, q6hr, PRN, PRN: as needed for nausea/vomiting, 30 tab(s), 1 Refill(s)  magnesium oxide 500 mg oral tablet: 500 mg, 1 tab(s), Oral, Daily, discontinue mag ox 400 mg daily, 30 tab(s), 2 Refill(s)  metFORMIN 1000 mg oral tablet: 1,000 mg, 1 tab(s), Oral, BID, 180 tab(s), 1 Refill(s)  ropinirole 0.5 mg oral tablet: See Instructions, 1 tab(s) Oral 1 to 3 hours before bedtime, 30 tab(s), 2 Refill(s)  traMADol 50 mg oral tablet: 50 mg, 1 tab(s), Oral, q6hr, PRN: pain, mild, 30 tab(s), 0 Refill(s)  Documented Medications  Documented  Acidophilus oral tablet: tab 1, Oral, Daily, 0 Refill(s)  Aleve 220 mg oral tablet: 1 cap(s), Oral, q8hr, PRN: as needed for pain, 40 cap(s), 0 Refill(s)  Ambien 10 mg oral tab -[facility]: 1 tab(s), Oral, Once a day (at bedtime), PRN: as needed for insomnia, 0 Refill(s)  Bystolic 10 mg oral tablet: 10 mg, 1 tab(s), Oral, Daily, 30 tab(s)  Centrum Women's oral tablet: 1 tab(s), Oral, Daily, 0 Refill(s)  Lubricant Eye Drops ophthalmic solution: 1 drop(s), Eye-Both, BID, PRN: as needed for dry eyes, 30 EA, 0 Refill(s)  Misc Prescription: Biotin 10,000 mcg daily, 0 Refill(s)  NovoLIN R FlexPen 100 units/mL injectable solution: Subcutaneous, As Directed  Symbicort 160 mcg-4.5 mcg/inh inhalation  aerosol: 2 puff(s), INH, BID, PRN: wheezing and sob, 0 Refill(s)  Vitamin B12 Injection -[facility]: qMonth  Vitamin D3 5000 intl units (125 mcg) oral capsule: 5,000 IntUnit, 1 cap(s), Oral, Daily, with food, 100 cap(s), 0 Refill(s)  alPRAzolam 0.5 mg oral tablet: 0.5 mg, 1 tab(s), Oral, At Bedtime, PRN: for anxiety  albuterol-ipratropium 2.5 mg-0.5 mg/3 mL inhalation solution: 3 mL, NEB, q6hr  amlodipine-benazepril 10 mg-20 mg oral capsule: 1 cap(s), Oral, Daily  busPIRone 7.5 mg oral tablet: 7.5 mg, 1 tab(s), Oral, BID, 0 Refill(s)  loratadine 10 mg oral capsule: 10 mg, 1 cap(s), Oral, Daily  magnesium oxide 400 mg oral tablet: 400 mg, 1 tab(s), Oral, Daily, 0 Refill(s)  potassium chloride 20 mEq oral TABLET extended release: 20 mEq, 1 tab(s), Oral, Daily, 30 tab(s)   Problem list:    Active Problems (10)  Acid reflux   Asthma   Depression with anxiety   Diabetes mellitus type 2 in nonobese   History of gastric bypass   Hypertension   Low vitamin D level   NIKI on CPAP   Stage IV squamous cell carcinoma of lung   Vitamin B12 deficiency         Histories   Past Medical History:    No active or resolved past medical history items have been selected or recorded.   Family History:    No family history items have been selected or recorded.   Procedure history:    Mammogram ([id number]) on 1/1/2019 at 63 Years.  Bronchoscopy w/ Needle Aspir Biopsy(s) on 10/19/2017 at 62 Years.  Comments:  10/19/2017 13:24 Chasity Mistry RRT  auto-populated from documented surgical case  Bronchoscopy w/ Endobronch Biopsy(s) on 10/19/2017 at 62 Years.  Comments:  10/19/2017 13:24 Chasity Mistry RRT  auto-populated from documented surgical case  Bronchoscopy with Brushings on 10/19/2017 at 62 Years.  Comments:  10/19/2017 13:24 Chasity Mistry RRT  auto-populated from documented surgical case  Bronchoscopy, Ebus, 2 or Less Samples on 10/19/2017 at 62 Years.  Comments:  10/19/2017 13:24 NOREENT - Denis SOUTH , Chasity  C.  auto-populated from documented surgical case  gastric sleeve and bypass.  cheryl fundiplication.  bilatteral bunnionectomy.  bilatteral carpel tunnel repair.  lasix eye surgery.  partial hysterectomy.  cholecysectomy.  tonsillectomy.  hemmorhedectomy.   Social History        Social & Psychosocial Habits    Alcohol  02/06/2015 Risk Assessment: Denies Alcohol Use    10/10/2017  Use: Current    Type: Liquor    Frequency: Daily    04/06/2020  Use: Past    Type: Liquor    Frequency: 1-2 times per week    05/17/2020  Use: Past    Substance Use  02/06/2015 Risk Assessment: Denies Substance Abuse    Tobacco  02/06/2015 Risk Assessment: Denies Tobacco Use    03/19/2020  Use: Former smoker, quit more    Patient Wants Consult For Cessation Counseling N/A    04/22/2020  Use: Former smoker, quit more    Patient Wants Consult For Cessation Counseling No    Type: Cigarettes    Tobacco use per day: 20    Started at age: 15 Years    Comment: Quit date 01/01/1990 - 04/06/2020 14:32 Anitha Staples    05/13/2020  Use: Former smoker, quit more    Patient Wants Consult For Cessation Counseling No    05/17/2020  Use: Former smoker, quit more    Patient Wants Consult For Cessation Counseling No    05/20/2020  Use: Former smoker, quit more    Patient Wants Consult For Cessation Counseling No    Abuse/Neglect  04/06/2020  SHX Any signs of abuse or neglect No    Feels unsafe at home: No    04/22/2020  SHX Any signs of abuse or neglect No    04/22/2020  SHX Any signs of abuse or neglect No    05/13/2020  SHX Any signs of abuse or neglect No    05/17/2020  SHX Any signs of abuse or neglect No    Feels unsafe at home: No    Safe place to go: Yes    05/20/2020  SHX Any signs of abuse or neglect No  .        Physical Examination   Vital Signs   6/3/2020 10:24 CDT       Temperature Oral          36.7 DegC                             Temperature Oral (calculated)             98.06 DegF                             Peripheral Pulse Rate     106  bpm  HI                             Systolic Blood Pressure   142 mmHg  HI                             Diastolic Blood Pressure  96 mmHg  HI                             Blood Pressure Location   Left arm                             Manual Cuff BP            Yes     General:  Alert and oriented, No acute distress.    Eye:  Pupils are equal, round and reactive to light, Normal conjunctiva.    HENT:  Normocephalic, Oral mucosa is moist, No sinus tenderness.    Neck:  Supple, Non-tender, No lymphadenopathy.    Respiratory:  Lungs are clear to auscultation, Respirations are non-labored.    Cardiovascular:  Normal rate, Regular rhythm, No edema.    Gastrointestinal:  Soft, Non-tender, Non-distended, Normal bowel sounds.    Lymphatics:  No lymphadenopathy neck, axilla, groin.    Musculoskeletal:       Upper extremity exam: Shoulder ( Right, Pain, Range of motion ( Diminished ) ).    Integumentary:  Warm, Dry.    Neurologic:  Alert, Oriented, Cranial Nerves II-XII are grossly intact.    Cognition and Speech:  Speech clear and coherent.    Psychiatric:  Appropriate mood & affect.    ECOG Performance Scale: 1- Strenuous physical activity restricted; fully ambulatory and able to carry out light work.      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  Stable taylor metastasis.  Stable post therapeutic changes in the right lung.   She will continue single agent Abraxane per [facility] recs   Plain Xray right shoulder today  Repeat CTs after cycle 4  RTC in 3 weeks with repeat labs    Other Physicians

## 2022-04-30 NOTE — PROGRESS NOTES
Patient:   Lucy Love            MRN: [id number]            FIN: [id number]               Age:   66 years     Sex:  Female     :  1955   Associated Diagnoses:   None   Author:   Daija Quinn      Contact Information   Medical Oncologist: Tho Kirby MD   Surgeon: Darrell GREEN, Cale Weber   Additional Provider: Nazanin Wilson MD (MD Mcnamara)   Additional Provider:       Primary care physician: Primary Care Physician: Haresh GREEN, Fareed Galvin.       Background Information   Problem list:     All Problems  Hypertension / [id number] / Confirmed  Asthma / 215670498 / Confirmed  Acid reflux / 4013896412 / Confirmed  Stage IV squamous cell carcinoma of lung / 280931475 / Confirmed  Diabetes mellitus type 2 in nonobese / 1445370969 / Confirmed  NIKI on CPAP / 284524083 / Confirmed  Low vitamin D level / 188142488 / Confirmed  Depression with anxiety / 054353074 / Confirmed  History of gastric bypass / 2440374798 / Confirmed  Vitamin B12 deficiency / 712064567 / Confirmed  Anemia / 145277227 / Confirmed  Chemotherapy management, encounter for / 186984435 / Confirmed  Diabetes type 2 on insulin / 2021803925 / Confirmed  Type 1 diabetes mellitus / 079587759 / Confirmed  Neutropenia due to and following chemotherapy / 5323323454 / Confirmed  Osteopenia / 776129934 / Confirmed  Canceled: Heart disease / 16721296  Canceled: Sleep apnea / 384775703  Canceled: Abnormal chest CT / 5040600124  Canceled: Family history of lung cancer / 6326295492  Canceled: Routine health maintenance / 741234886  Canceled: Vitamin B12 deficiency / 965539600.    Cancer:   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,                                                                                                                            1. 2017 Tripped over cat injuring right posterior chest. CXR reveals right lung mass.   2. 10/6/2017 CT the chest with contrast 6.9 by 3.6 by 3.1 cm  right middle lobe mass invading the right middle lobe bronchus, 2mm nodule left apex   3. 10/19/2017 Bronchoscopy, EBUS with 4R biopsy demonstrating squamous cell carcinoma. 4R lymph node negative.   4.10/26/2017 PET CT right middle lobe lung mass measuring 6.8cm with SUV 18.8 extending into the right hilum. Right 11th rib with a displaced fracture SUV 4.1. Soft tissue throughout left maxillary sinus   with extension into left nasal cavity  5. MRI of the Brain is negative for intracranial metastases but showed a left maxillary sinus mass extending in to the left nasal cavity.   6. 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 This needs to be removed since there is a small chance this could be harboring an invasive malignancy   after the definitive treatments for her lung cancer.   7. An EBUS done outside was inconclusive.   8. EB[facility] done  [facility] was Negative for N2 disease.    The final stage is T4N0M0 (Stage III) Squamous cell Carcinoma of the lung. Plan is to do induction chemotherapy followed by surgery. The patient did not qualify for the cisplatin, docetaxel and   Nintedanib secondary to the central nature of the tumor and the presence of vascular invasion.   9. 12/5/2017 - 2/6/2018 Chemotherapy/3 cycles of carboplatin and Abraxane  10. Completed definitive radiation therapy to the Chest 05/23/18.   11. 10/25/19. Newly noted bilateral pulmonary nodules Biopsy confirms squamous cell carcinoma.   12. 11/2019. The patient has NFE2L2 mutation and thus enrolled on the glutaminase inhibitor trial run by Dr. Bertrand Smith.   13. Right lung has progressed after 2 cycles of treatment with [id number] (Glutimase inhibitor).   14. PD-L1 80%. Initiated on single agent Pembrolizumab on 02/07/2020 --3/2020  - Restaging s/p c#2 showed good treatment response, how ever developed DKA requiring ICU admission and continuation of insulin.    Abraxane X 2 cycles (4/22/20)  15. 6/2/20:  Repeat CTs showed good response to treatment  16. CT C/A/P/ brain: July 31, 2020: CT the brain no evidence of abnormality, CT of the chest abdomen and pelvis, compared to outside CT of the chest in June, and abdomen pelvis in March,  New superior left lower lobe 1 cm spiculated nodule, progressive subcarinal lymphadenopathy 3.2 x 1.4 cm compared to 2.3 x 1.2 cm, unchanged right hepatic cyst, hypodensity lesion in the liver,  Changed adrenal nodule,  9/11/20: MRI of the brain- no evidence of cranial metastases  9/4/20:PET/CT- 2 new pulmonary nodes seen on the 7/31/2020 chest CT mildly smaller today do not have increased FDG activity, although the small size does limit PET sensitivity, probe to borderline enlarged anterior paratracheal and gastropathic lymph nodes are mild hypermetabolism.  Nonspecific.    9/8/20: Gemzar 1000 mg/m² day 1, day 8 q 3 weeks--started    10/14/2020: CT-:Right middle lobe changes expected postsurgical scarring and radiation changes stable, pulmonary parenchymal metastatic disease likely improved compared to 9/ 4/2020 within limitations of comparison but clearly improved from 7/29/2020 minimal ground-glass opacities represent sequelae of resolving metastases infection or inflammatory process, other stable findings   Gemzar 1000 mg/m² day 1, day 8 q 3 weeks- September 8, 2020--- February 24, 2021--stopped due to progression see below  3/11/2021: MRI of the brain, no evidence of intracranial metastases  3/10/2021: PET CT scan, FDG avid right paratracheal and bilateral hilar adenopathy represent neoplastic involvement or reactive process.  There is a left gastric and left retroperitoneal adenopathy suspicious for focal metastases.  Stable residual lung metastases are not avid, and the C4 metastases is not avid.  She has progressive disease in the lower abdomen.  She been recommended for phase 1 trial if clinical trial is not an option the consideration of docetaxel ramucirumab was made to the  patient.  Liquid biopsy for molecular profiling was done an MRI of the brain.  We will discontinue Gemzar and follow is the [facility] note  , Date: 10/6/2017, disease progression 3/10/2021.    Staging:          Stage: IV.         Metastasis: Location: left lung.       Health Status   Allergies:    Allergic Reactions (Selected)  Severity Not Documented  Iodine- Rash, swelling and throat swells.  Latex- Rash.  Norco- Dillusions, insomnia and itching.  Phenergan- Insomnia.  Shellfish- Rash, throat closes and face swells.  Tape- Red rash and gang green.   Current medications:  (Selected)   Inpatient Medications  Ordered  DuoNeb 0.5 mg-2.5 mg/3 mL inhalation solution: 3 mL, form: Soln, NEB, Once, first dose 10/19/17 8:38:00 CDT, stop date 10/19/17 8:38:00 CDT  Heparin Flush 100 U/mL - 5 mL: 500 units, form: Injection, IV Push, Once-chemo, first dose 07/15/20 10:47:00 CDT, stop date 07/15/20 10:47:00 CDT, Routine, Days 1  Versed: 1 mg, form: Injection, IV Push, Once, first dose 10/19/17 8:00:00 CDT, stop date 10/19/17 8:00:00 CDT, 1 to 2 mg initial then 1 mg every 2 min until sedation level 2 achieved, 24  Future  Aloxi (for IVPB): 0.25 mg, form: Injection, IV Piggyback, Once-chemo, Infuse over: 20 minute(s), *Est. first dose 03/30/21 8:00:00 CDT, *Est. stop date 03/30/21 8:00:00 CDT, Future Order, Days 1  Benadryl 50mg/ml Inj: 25 mg, form: Infusion, IV Piggyback, Once-chemo, Infuse over: 20 minute(s), *Est. first dose 03/30/21 8:00:00 CDT, *Est. stop date 03/30/21 8:00:00 CDT, Routine, Days 1, Future Order  Cyramza (for IVPB): 580 mg, form: Soln, IV Piggyback, Once-chemo, Infuse over: 60 minute(s), *Est. first dose 03/30/21 8:50:00 CDT, *Est. stop date 03/30/21 8:50:00 CDT, Future Order, Use of 0.22 Micron in-line filter, Days 1  Heparin Flush 100 U/mL - 5 mL: 500 units, form: Injection, IV Push, Once-chemo, *Est. first dose 03/30/21 9:20:00 CDT, *Est. stop date 03/30/21 9:20:00 CDT, Routine, Days 1, Future  Order  Neulasta Inj. (CCA): 6 mg, form: Injection, Subcutaneous, Once-chemo, first dose 03/31/21 9:20:00 CDT, stop date 03/31/21 9:20:00 CDT, Routine, Diagnosis:  Drug Induced Neutropenia, Days 2, Future Order  Pepcid (for IVPB): 20 mg, form: Infusion, IV Piggyback, Once-chemo, Infuse over: 20 minute(s), *Est. first dose 03/30/21 8:00:00 CDT, *Est. stop date 03/30/21 8:00:00 CDT, Future Order, Days 1  Taxotere (for IVPB): 120 mg, form: Infusion, IV Piggyback, Once-chemo, Infuse over: 1 hr, *Est. first dose 03/30/21 8:20:00 CDT, *Est. stop date 03/30/21 8:20:00 CDT, Future Order, Days 1  Tylenol: 650 mg, form: Tab, Oral, Once-chemo, *Est. first dose 03/30/21 8:50:00 CDT, *Est. stop date 03/30/21 8:50:00 CDT, Routine, Days 1, Future Order  dexamethasone (for IVPB): 10 mg, form: Injection, IV Piggyback, Once-chemo, Infuse over: 20 minute(s), *Est. first dose 03/30/21 8:00:00 CDT, *Est. stop date 03/30/21 8:00:00 CDT, Future Order, Days 1  Prescriptions  Prescribed  Lantus Solostar Pen 100 units/mL subcutaneous solution: 12 units, Subcutaneous, Daily, # 15 mL, 3 Refill(s), Pharmacy: [facility], 161, cm, Height/Length Dosing, 05/20/20 14:05:00 CDT, 70.3, kg, Weight Dosing, 05/20/20 14:05:00 CDT  Nb Magnesium 500mg Tab 200: Nb Magnesium 500mg Tab 200, See Instructions, take 1 tablet by mouth once a day. stop taking the 400mg tablets, # 30 EA, 5 Refill(s), Pharmacy: [facility], 161, cm, Height/Length Dosing, 07/15/20 10:49:00 CDT, 66.8, kg, Weight Dosing, 07/1...  Trulicity Pen 1.5 mg/0.5 mL subcutaneous solution: 1.5 mg = 0.5 mL, Subcutaneous, qWeek, # 2.5 mL, 11 Refill(s), Pharmacy: [facility], 158, cm, Height/Length Dosing, 04/22/20 15:18:00 CDT, 75.3, kg, Weight Dosing, 04/22/20 15:18:00 CDT  Vitamin D3 1000 intl units oral tablet: 1,000 IntUnit = 1 tab(s), Oral, Daily, # 60 tab(s), 0 Refill(s), other reason (Rx)  Zofran 8 mg oral tablet: See Instructions, 1 tab(s) Oral TID prn N+V,  # 30 tab(s), 1 Refill(s), Pharmacy: [facility], 161, cm, Height/Length Dosing, 01/13/21 13:06:00 CST, 59.4, kg, Weight Dosing, 01/13/21 13:06:00 CST  Zofran 8 mg oral tablet: See Instructions, 1 tab(s) Oral TID prn N+V, # 30 tab(s), 1 Refill(s), Pharmacy: [facility], 161, cm, Height/Length Dosing, 02/03/21 12:46:00 CST, 61.4, kg, Weight Dosing, 02/03/21 12:46:00 CST  dronabinol 2.5 mg oral capsule: 2.5 mg = 1 cap(s), Oral, Once a day (at bedtime), # 30 cap(s), 2 Refill(s), Pharmacy: [facility], 161, cm, Height/Length Dosing, 11/19/20 15:21:00 CST, 59, kg, Weight Dosing, 11/19/20 15:21:00 CST  ferrous gluconate 324 mg (38 mg elemental iron) oral tablet: See Instructions, TAKE ONE TABLET BY MOUTH DAILY, # 100 tab(s), 3 Refill(s), Pharmacy: [facility], 161, cm, Height/Length Dosing, 02/24/21 12:11:00 CST, 59.8, kg, Weight Dosing, 02/24/21 12:11:00 CST  magnesium oxide base 500 mg oral tablet: 500 mg = 1 tab(s), Oral, BID, # 60 tab(s), 0 Refill(s), Pharmacy: [facility], 161, cm, Height/Length Dosing, 12/16/20 11:42:00 CST, 60, kg, Weight Dosing, 12/16/20 11:42:00 CST  metFORMIN 1000 mg oral tablet: 1,000 mg = 1 tab(s), Oral, BID, # 180 tab(s), 1 Refill(s), Pharmacy: [facility], 161, cm, Height/Length Dosing, 01/27/21 10:17:00 CST, 59.4, kg, Weight Dosing, 01/27/21 10:17:00 CST  mirtazapine 30 mg oral tablet: See Instructions, TAKE ONE TABLET BY MOUTH AT BEDTIME, # 90 tab(s), 3 Refill(s), Pharmacy: [facility], 161, cm, Height/Length Dosing, 02/24/21 12:11:00 CST, 59.8, kg, Weight Dosing, 02/24/21 12:11:00 CST  ropinirole 0.5 mg oral tablet: See Instructions, TAKE ONE TABLET BY MOUTH 1 to 3 hours before bedtime, # 30 tab(s), 5 Refill(s), Pharmacy: [facility], 161, cm, Height/Length Dosing, 11/24/20 9:08:00 CST, 59, kg, Weight Dosing, 11/24/20 9:08:00 CST  traMADol 50 mg oral tablet: 50 mg = 1 tab(s), Oral, q6hr, PRN PRN pain,  mild, # 30 tab(s), 0 Refill(s), Pharmacy: [facility], 161, cm, Height/Length Dosing, 11/02/20 11:21:00 CST, 62.6, kg, Weight Dosing, 11/02/20 11:21:00 CST  Documented Medications  Documented  Aciphex 20 mg oral EC tablet (pt. own): 20 mg = 1 tab(s), Oral, Daily, 0 Refill(s)  Baqsimi Two Pack 3 mg nasal powder: 3 mg, Nasal, As Directed  BuSpar 7.5 mg oral tablet: = 1 tab(s), Oral, TID, # 90 tab(s), 0 Refill(s)  Bystolic 10 mg oral tablet: 10 mg = 1 tab(s), Oral, Daily, # 30 tab(s), 0 Refill(s)  Centrum Women's oral tablet: 1 tab(s), Oral, Daily, 0 Refill(s)  Cequa 0.09% ophthalmic solution: 1 drop(s), OPTH, BID  Lubricant Eye Drops ophthalmic solution: 1 drop(s), Eye-Both, BID, PRN PRN as needed for dry eyes, # 30 EA, 0 Refill(s)  Lyumjev KwikPen 100 units/mL injectable solution: See Instructions, sliding scale, 0 Refill(s)  Symbicort 160 mcg-4.5 mcg/inh inhalation aerosol: 2 puff(s), INH, BID, PRN PRN wheezing and sob, 0 Refill(s)  Trintellix 20 mg oral tablet: 20 mg = 1 tab(s), Oral, Daily  Vitamin B12 Injection -CCA: qMonth, 0 Refill(s)  alPRAzolam 0.5 mg oral tablet: 0.5 mg = 1 tab(s), Oral, At Bedtime, PRN for anxiety, 0 Refill(s)  albuterol-ipratropium 2.5 mg-0.5 mg/3 mL inhalation solution: 3 mL, NEB, q6hr, PRN PRN sob or wheezing  amlodipine-benazepril 10 mg-20 mg oral capsule: 1 cap(s), Oral, Daily, 0 Refill(s)  cyproheptadine 4 mg oral tablet: 4 mg = 1 tab(s), Oral, TID  meloxicam 15 mg oral tablet: 15 mg = 1 tab(s), Oral, Daily, 0 Refill(s)  montelukast 10 mg oral TABLET: 10 mg = 1 tab(s), Oral, Daily, 0 Refill(s)  potassium chloride 20 mEq oral TABLET extended release: 20 mEq = 1 tab(s), Oral, Daily  temazepam 15 mg oral capsule: 15 mg = 1 cap(s), Oral, qPM  temazepam 30 mg oral capsule: 30 mg = 1 cap(s), Oral, qPM   Procedure history:    Mammogram ([id number]) on 12/1/2020 at 65 Years.  Comments:  12/16/2020 10:05 ARNALDO - Omaira Patel  [facility]  Port Insertion on  11/2/2020 at 65 Years.  Mammogram ([id number]) on 1/1/2019 at 63 Years.  Bronchoscopy w/ Needle Aspir Biopsy(s) on 10/19/2017 at 62 Years.  Comments:  10/19/2017 13:24 Chasity Mistry RRT.  auto-populated from documented surgical case  Bronchoscopy w/ Endobronch Biopsy(s) on 10/19/2017 at 62 Years.  Comments:  10/19/2017 13:24 Chasity Mistry RRT.  auto-populated from documented surgical case  Bronchoscopy with Brushings on 10/19/2017 at 62 Years.  Comments:  10/19/2017 13:24 Chasity Mistry RRT.  auto-populated from documented surgical case  Bronchoscopy, Ebus, 2 or Less Samples on 10/19/2017 at 62 Years.  Comments:  10/19/2017 13:24 Chasity Mistry RRT.  auto-populated from documented surgical case  gastric sleeve and bypass.  cheryl fundiplication.  bilatteral bunnionectomy.  bilatteral carpel tunnel repair.  lasix eye surgery.  partial hysterectomy.  cholecysectomy.  tonsillectomy.  hemmorhedectomy.  dexcom 6 glucose monitoring.      Review of Systems   Constitutional:  Fatigue.    Ear/Nose/Mouth/Throat:  Negative.    Respiratory:  Shortness of breath (PRICE).    Cardiovascular:  Negative, Left upper chest wall mediport.    Gastrointestinal:  Negative.    Genitourinary:  Negative.    Hematology/Lymphatics:  Negative.    Immunologic:  Negative.    Musculoskeletal:  Negative.    Integumentary:  Negative, alopecia.    Immunologic:  Negative.    Immunologic:  Negative.    Immunologic:  Negative.    Neurologic:  Negative.    Psychiatric:  Negative.       Physical Examination   VS/Measurements      Treatment   Chemotherapy Regimen: Ramucirumab (Cyramza) 10mg/Kg 580mg IV/Docetaxel 75mg/m2 120mg IV every 3 weeks with Neulasta support:  Start date: 3/30/2021.         Clinical trial: No.         Intent of treatment: Disease or symptom control.         ECOG performance status: Start of treatment: 1.    Radiation:  Not planned.       Plan   Follow-up with medical oncology:  To see Kofi GREEN, Tho ROSARIO  (RTC 3/29/2021 at 1315 lab and at 1330 TD with KALLIE Perez NP and 3/30/2021 at 1000 cycle #1 of chemotherapy.).    Follow-up imaging & studies   Patient instructions:     Potential late effects of treatment Discussed.    Call your doctor if you have Phone list and when to call reviewed and copy e-mailed.    Information regarding his/her diagnosis Given.    Discussed Goals of Therapy Yes.    Discussed Premedication Yes.    Discussed Chemo Drugs Yes.    Discussed Chemo Regimen Yes.    Discussed short - and long-term adverse Effects Yes.    Discussed S/S to call MD/NP Yes.    Written material given to patient Given.    Questions answered Yes.    Verbalized Understanding Yes.    Referral provided:  Dietician, .    Counseled::  Patient.    Summary:   Prescription per Dr. Kirby called in to [facility] in [place]: Dexamethasone 4mg tab 2 PO with supper day before chemotherapy and tab 1 with lbreakfast and tab 1 with lunch for 3 days after chemotherapy. dispense 48 refill 0.                                                                                                               Start of visit: 1020 and visit completed 18371. I spent 45 minutes face to face with the patient..

## 2022-04-30 NOTE — PROGRESS NOTES
Patient:   Lucy Love            MRN: [id number]            FIN: [id number]               Age:   65 years     Sex:  Female     :  1955   Associated Diagnoses:   None   Author:   Tho Kirby MD      Chief Complaint   10/26/2020 10:02 CDT     Last visit with [facility] 10/14/2020. Has appointment 10/30/2020 with Dr. Garcia to talk about mediport insertion.        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 CXR reveals right lung mass.   10/6/2017 CT the chest with contrast 6.9 by 3.6 by 3.1 cm right middle lobe mass invading the right middle lobe bronchus, 2mm nodule left apex   10/19/2017 Bronchoscopy, EBUS with 4R biopsy demonstrating squamous cell carcinoma. 4R lymph node negative.   10/26/2017 PET CT right middle lobe lung mass measuring 6.8cm with SUV 18.8 extending into the right hilum. Right 11th rib with a displaced fracture SUV 4.1. Soft tissue throughout left   maxillary sinus with extension into left nasal cavity   MRI of the Brain is negative for intracranial metastases but showed a left maxillary sinus mass extending in to the left nasal cavity.    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 This needs to be removed since there is a small chance this could be harboring an invasive   malignancy after the definitive treatments for her lung cancer.    An EBUS done outside was inconclusive.    EBUS done  [facility] was Negative for N2 disease.    The final stage is T4N0M0 (Stage III) Squamous cell Carcinoma of the lung. Plan is to do induction chemotherapy followed by surgery. The patient did not qualify for the cisplatin, docetaxel   and Nintedanib secondary to the central nature of the tumor and the  presence of vascular invasion.   5/2017 - 2/6/2018 Chemotherapy/3 cycles of carboplatin and Abraxane  5/23/2018: Completed definitive radiation therapy to the Chest 05/23/18.   10/25/19. Newly noted bilateral pulmonary nodules Biopsy confirms squamous cell carcinoma.   11/2019. The patient has NFE2L2 mutation and thus enrolled on the glutaminase inhibitor trial run by Dr. Bertrand Smith.    Right lung has progressed after 2 cycles of treatment with [id number] (Glutimase inhibitor).   PD-L1 80%. Initiated on single agent Pembrolizumab on 02/07/2020 --3/2020  - Restaging s/p c#2 showed good treatment response, how ever developed DKA requiring ICU admission and continuation of insulin.    Abraxane X 2 cycles (4/22/20)--- 7/15/2020:   6/2/20: Repeat CTs showed good response to treatment  7/31/20: CT C/A/P/ brain:CT the brain no evidence of abnormality, CT of the chest abdomen and pelvis, compared to outside CT of the chest in June, and abdomen pelvis in March,   New superior left lower lobe 1 cm spiculated nodule, progressive subcarinal lymphadenopathy 3.2 x 1.4 cm compared to 2.3 x 1.2 cm, unchanged right hepatic cyst, hypodensity lesion in the   liver. Changed adrenal nodule,  4/22/20-7/15/20: Abraxane q 3 weeks  9/11/20: MRI of the brain- no evidence of cranial metastases  9/4/20:PET/CT- 2 new pulmonary nodes seen on the 7/31/2020 chest CT mildly smaller today do not have increased FDG activity, although the small size does limit PET sensitivity, probe to   borderline enlarged anterior paratracheal and gastropathic lymph nodes are mild hypermetabolism.  Nonspecific.  9/8/20: Gemzar 1000 mg/m² day 1, day 8 q 3 weeks  10/14/2020 at [facility] CT scan chest abdomen pelvis impression: Right middle lobe changes expected postsurgical scarring and radiation changes stable, pulmonary parenchymal metastatic disease likely improved compared to 9/ 4/2020 within limitations of comparison but clearly improved from 7/29/2020 minimal  ground-glass opacities represent sequelae of resolving metastases infection or inflammatory process, other stable findings      Interval History   Patient presents for treatment visit, scheduled for C3D1. She is doing well and tolerating treatment without toxicities.   10/14/2020 INR 1.14, T4 still low at 0.9, TSH normal at 0.9.  AST mildly elevated at 47 glucose 163,   and she was told to continue on Gemzar.    CT report stable, she has also asked for a port placement      Review of Systems   Constitutional:  Decreased activity, No chills, No sweats, No weakness, No fatigue.    Eye:  No recent visual problem, No blurring, No double vision.    Ear/Nose/Mouth/Throat:  No nasal congestion, No sore throat.    Respiratory:  Cough, Sputum production, No shortness of breath, No hemoptysis, No wheezing.    Cardiovascular:  No chest pain, No palpitations, No bradycardia, No peripheral edema.    Gastrointestinal:  Heartburn, No vomiting, No diarrhea, No constipation.    Genitourinary:  No dysuria, No hematuria.    Hematology/Lymphatics:  Bruising tendency.    Immunologic:  Chemotherapy.    Musculoskeletal:  No joint pain, No muscle pain.    Integumentary:  No rash, No pruritus.    Neurologic:  Alert and oriented X4, Numbness, Tingling, No abnormal balance, No confusion, No headache.    Psychiatric:  Negative.       Health Status   Allergies:    Allergic Reactions (Selected)  Severity Not Documented  Iodine- Rash, swelling and throat swells.  Latex- Rash.  Norco- Dillusions, insomnia and itching.  Phenergan- Insomnia.  Shellfish- Rash, throat closes and face swells.  Tape- Red rash and gang green.,    Allergies (6) Active Reaction  iodine throat swells  Latex Rash  Norco insomnia  Phenergan insomnia  shellfish rash  Tape red rash     Current medications:  (Selected)   Inpatient Medications  Ordered  DuoNeb 0.5 mg-2.5 mg/3 mL inhalation solution: 3 mL, NEB, Once  Heparin Flush 100 U/mL - 5 mL: 500 units, 5 mL, IV Push,  Once-chemo, Days 1  Versed: 1 mg, 1 mL, IV Push, Once  Prescriptions  Prescribed  Lantus Solostar Pen 100 units/mL subcutaneous solution: 20 units, Subcutaneous, Daily, 15 mL, 3 Refill(s)  Nb Magnesium 500mg Tab 200: See Instructions, take 1 tablet by mouth once a day. stop taking the 400mg tablets, 30 EA, 5 Refill(s)  True Matrix Glucose Machine, Test Strips & Lancets: See Instructions, Patient to test FBG in AM, random at noon ana, at dinner time and before bed.    Patient needs a True Matrix Glucose Monitor as well as the    true matrix test strips and lancets.    DX E11.9, 1 kit(s), 0 Refill(s)  Trulicity Pen 1.5 mg/0.5 mL subcutaneous solution: 1.5 mg, 0.5 mL, Subcutaneous, qWeek, 2.5 mL, 11 Refill(s)  Tylenol with Codeine #3 oral tablet: See Instructions, 1 tab(s) Oral q6h as needed  for 3 days following chemotherapy, 20 tab(s), 0 Refill(s)  Vitamin D3 1000 intl units oral tablet: 2,000 IntUnit, 2 tab(s), Oral, Daily, 60 tab(s), 0 Refill(s)  Zofran 8 mg oral tablet: See Instructions, 1 tab(s) Oral TID prn N+V, 30 tab(s), 0 Refill(s)  metFORMIN 1000 mg oral tablet: 1,000 mg, 1 tab(s), Oral, BID, 180 tab(s), 1 Refill(s)  potassium chloride 10 mEq oral CAPSULE extended release: 10 mEq, 1 cap(s), Oral, Daily, 30 cap(s), 1 Refill(s)  ropinirole 0.5 mg oral tablet: See Instructions, TAKE ONE TABLET BY MOUTH 1 -3 hours before bedtime, 30 tab(s), 5 Refill(s)  temazepam 30 mg oral capsule: 30 mg, 1 cap(s), Oral, Once a day (at bedtime), PRN: for sleep, 30 cap(s), 0 Refill(s)  traMADol 50 mg oral tablet: 50 mg, 1 tab(s), Oral, q6hr, PRN: pain, mild, 30 tab(s), 0 Refill(s)  Documented Medications  Documented  Aciphex 20 mg oral EC tablet (pt. own): 20 mg, 1 tab(s), Oral, Daily, 0 Refill(s)  BuSpar 7.5 mg oral tablet: 1 tab(s), Oral, TID, 90 tab(s), 0 Refill(s)  Bystolic 10 mg oral tablet: 10 mg, 1 tab(s), Oral, Daily, 30 tab(s)  Centrum Women's oral tablet: 1 tab(s), Oral, Daily, 0 Refill(s)  Lubricant Eye Drops  ophthalmic solution: 1 drop(s), Eye-Both, BID, PRN: as needed for dry eyes, 30 EA, 0 Refill(s)  NovoLIN R FlexPen 100 units/mL injectable solution: Subcutaneous, As Directed  Symbicort 160 mcg-4.5 mcg/inh inhalation aerosol: 2 puff(s), INH, BID, PRN: wheezing and sob, 0 Refill(s)  Vitamin B12 Injection -CCA: qMonth  alPRAzolam 0.5 mg oral tablet: 0.5 mg, 1 tab(s), Oral, At Bedtime, PRN: for anxiety  albuterol-ipratropium 2.5 mg-0.5 mg/3 mL inhalation solution: 3 mL, NEB, q6hr, PRN: sob or wheezing  amlodipine-benazepril 10 mg-20 mg oral capsule: 1 cap(s), Oral, Daily  meloxicam 15 mg oral tablet: 15 mg, 1 tab(s), Oral, Daily, 0 Refill(s)  mirtazapine 15 mg oral tablet: 15 mg, 1 tab(s), Oral, Once a day (at bedtime), 0 Refill(s)  montelukast 10 mg oral TABLET: 10 mg, 1 tab(s), Oral, Daily, 0 Refill(s)   Problem list:    All Problems  Acid reflux / 9322268168 / Confirmed  Anemia / 966660311 / Confirmed  Asthma / 359909828 / Confirmed  Vitamin B12 deficiency / 031524392 / Confirmed  Low vitamin D level / 589438390 / Confirmed  History of gastric bypass / 9325038817 / Confirmed  Hypertension / 81958458 / Confirmed  Depression with anxiety / 110081441 / Confirmed  NIKI on CPAP / 881554587 / Confirmed  Stage IV squamous cell carcinoma of lung / 319298412 / Confirmed  Diabetes mellitus type 2 in nonobese / 9305163325 / Confirmed  Canceled: Vitamin B12 deficiency / 189630903  Canceled: Abnormal chest CT / 1963103710  Canceled: Family history of lung cancer / 8454498983  Canceled: Heart disease / 79955387  Canceled: Routine health maintenance / 051522227  Canceled: Sleep apnea / 856565843,    Active Problems (11)  Acid reflux   Anemia   Asthma   Depression with anxiety   Diabetes mellitus type 2 in nonobese   History of gastric bypass   Hypertension   Low vitamin D level   NIKI on CPAP   Stage IV squamous cell carcinoma of lung   Vitamin B12 deficiency         Histories   Past Medical History:    No active or resolved past  medical history items have been selected or recorded.   Family History:    No family history items have been selected or recorded.   Procedure history:    Mammogram ([id number]) on 1/1/2019 at 63 Years.  Bronchoscopy w/ Needle Aspir Biopsy(s) on 10/19/2017 at 62 Years.  Comments:  10/19/2017 13:24 Chasity Mistry RRT  auto-populated from documented surgical case  Bronchoscopy w/ Endobronch Biopsy(s) on 10/19/2017 at 62 Years.  Comments:  10/19/2017 13:24 Chasity Mistry RRT.  auto-populated from documented surgical case  Bronchoscopy with Brushings on 10/19/2017 at 62 Years.  Comments:  10/19/2017 13:24 Chasity Mistry RRT  auto-populated from documented surgical case  Bronchoscopy, Ebus, 2 or Less Samples on 10/19/2017 at 62 Years.  Comments:  10/19/2017 13:24 Chasity Mistry RRT  auto-populated from documented surgical case  gastric sleeve and bypass.  cheryl fundiplication.  bilatteral bunnionectomy.  bilatteral carpel tunnel repair.  lasix eye surgery.  partial hysterectomy.  cholecysectomy.  tonsillectomy.  hemmorhedectomy.   Social History        Social & Psychosocial Habits    Alcohol  02/06/2015 Risk Assessment: Denies Alcohol Use    10/10/2017  Use: Current    Type: Liquor    Frequency: Daily    04/06/2020  Use: Past    Type: Liquor    Frequency: 1-2 times per week    05/17/2020  Use: Past    Substance Use  02/06/2015 Risk Assessment: Denies Substance Abuse    Tobacco  02/06/2015 Risk Assessment: Denies Tobacco Use    10/06/2020  Use: Former smoker, quit more    Patient Wants Consult For Cessation Counseling N/A    Abuse/Neglect  10/06/2020  SHX Any signs of abuse or neglect No    Spiritual/Cultural  07/15/2020  [employer]      07/15/2020  Branch of  Never in   .        Physical Examination   Vital Signs   10/26/2020 10:02 CDT     Temperature Oral          37 DegC                             Temperature Oral (calculated)             98.60  DegF                             Peripheral Pulse Rate     85 bpm                             SpO2                      92 %                             Oxygen Therapy            Room air                             Systolic Blood Pressure   132 mmHg                             Diastolic Blood Pressure  96 mmHg  HI     Measurements from flowsheet : Measurements   10/26/2020 10:02 CDT     Weight Dosing             62.600 kg                             Weight Measured           62.6 kg                             Weight Measured and Calculated in Lbs     138.01 lb                             Height/Length Dosing      161.00 cm                             Height/Length Measured    161 cm                             BSA Measured              1.67 m2                             Body Mass Index Measured  24.15 kg/m2     General:  Alert and oriented, No acute distress.    Eye:  Pupils are equal, round and reactive to light, Normal conjunctiva.    HENT:  Normocephalic, Normal hearing, Oral mucosa is moist, No pharyngeal erythema.    Neck:  Supple, Non-tender, No jugular venous distention, No lymphadenopathy.    Respiratory:  Lungs are clear to auscultation, Respirations are non-labored, Symmetrical chest wall expansion.    Cardiovascular:  Normal rate, Regular rhythm, No gallop, No edema.    Gastrointestinal:  Soft, Non-tender, Non-distended, Normal bowel sounds.    Lymphatics:  No lymphadenopathy neck, axilla, groin.    Musculoskeletal:  Normal range of motion.    Integumentary:  Warm, Intact, No pallor.    Neurologic:  Alert, Oriented.    Cognition and Speech:  Oriented, Speech clear and coherent.    Psychiatric:  Cooperative, Appropriate mood & affect.    ECOG Performance Scale: 0 - Fully active; no performance restrictions.      Review / Management   Results review   Laboratory Results   Last 5 Days Lab Results : PowerNote Discrete Results   10/26/2020 10:06 CDT     POC TCO2                  28.0 mmol/L                              POC Hb                    12.6 mg/dL                             POC Hct                   37.0 %  LOW                             POC Sodium                140 mmol/L                             POC Potassium             4.8 mmol/L                             POC Chloride              102 mmol/L                             POC Ion Calcium           1.24 mmol/L                             POC Glucose               140 mg/dL  HI                             POC BUN                   12.0 mg/dL                             POC Creatinine            0.8 mg/dL                             POC AGAP                  16.0  NA    10/26/2020 9:38 CDT      WBC                       3.0 x10(3)/mcL  LOW                             RBC                       3.57 x10(6)/mcL  LOW                             Hgb                       11.3 gm/dL  LOW                             Hct                       35.4 %  LOW                             Platelet                  782 x10(3)/mcL  HI                             MCV                       99.2 fL  HI                             MCH                       31.7 pg  HI                             MCHC                      31.9 gm/dL  LOW                             RDW                       16.7 %                             MPV                       8.4 fL  LOW                             Abs Neut                  0.99 x10(3)/mcL  LOW                             NEUT%                     33.2 %  NA                             NEUT#                     1.0 x10(3)/mcL  LOW                             LYMPH%                    35.1 %  NA                             LYMPH#                    1.0 x10(3)/mcL                             MONO%                     20.7 %  NA                             MONO#                     0.6 x10(3)/mcL                             EOS%                      5.4 %  NA                             EOS#                      0.2 x10(3)/mcL                              BASO%                     3.3 %  NA                             BASO#                     0.1 x10(3)/mcL        Response to Treatment:  Stable disease.       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 Ramírez on Friday  Continue gemcitabine--today  Return to clinic for cycle 4-day 1  Has repeat follow-up with MD Mcnamara in 2 months    Tho Kirby MD                 Other Physicians   [facility] team

## 2022-04-30 NOTE — PROGRESS NOTES
Patient:   Lucy Love            MRN: [id number]            FIN: [id number]               Age:   65 years     Sex:  Female     :  1955   Associated Diagnoses:   None   Author:   Tho Kirby MD      Chief Complaint   2020 10:19 CDT      Restless legs at night. Can the zofran be raised to 8mg? She only has 4 mg.        Visit Information   65 yr old female   Initial [facility] Visit: Previously untreated disease   1. 2017 Tripped over cat injuring right posterior chest. CXR reveals right lung mass.   2.10/6/2017 CT the chest with contrast 6.9 by 3.6 by 3.1 cm right middle lobe mass invading the right middle lobe bronchus, 2mm nodule left apex   3.10/19/2017 Bronchoscopy, EBUS with 4R biopsy demonstrating squamous cell carcinoma. 4R lymph node negative.   4.10/26/2017 PET CT right middle lobe lung mass measuring 6.8cm with SUV 18.8 extending into the right hilum. Right 11th rib with a displaced fracture SUV 4.1. Soft tissue throughout left maxillary sinus with extension into left nasal cavity  5. MRI of the Brain is negative for intracranial metastases but showed a left maxillary sinus mass extending in to the left nasal cavity.   4. 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 This needs to be removed since there is a small chance this could be harboring an invasive malignancy after the definitive treatments for her lung cancer.   5. An EBUS done outside was inconclusive.   6. EBUS done  [facility] was Negative for N2 disease.   8. The final stage is T4N0M0 (Stage III) Squamous cell Carcinoma of the lung. Plan is to do induction chemotherapy followed by surgery. The patient did not qualify for the cisplatin, docetaxel and Nintedanib secondary to the central nature of the tumor and the presence of vascular invasion.   9. 2017 - 2018 Chemotherapy/3 cycles of carboplatin and Abraxane  10. Completed definitive radiation therapy to the  Chest 05/23/18.   11. 10/25/19. Newly noted bilateral pulmonary nodules Biopsy confirms squamous cell carcinoma.   12. 11/2019. The patient has NFE2L2 mutation and thus enrolled on the glutaminase inhibitor trial run by Dr. Bertrand Smith.   13. Right lung has progressed after 2 cycles of treatment with [id number] (Glutimase inhibitor).   14. PD-L1 80%. Initiated on single agent Pembrolizumab on 02/07/2020 --3/2020  - Restaging s/p c#2 showed good treatment response, how ever developed DKA requiring ICU admission and continuation of insulin.                - Hence d/c'd Pemebro and will  discussed initiate Carboplatin+ Abraxane  vs observation locally with Dr. Kirby (with DR. Patel)           Interval History     Addendum by Tho Kirby MD on April 06, 2020 17:26 CDT (Verified)  Called by the lab of the patient's critical blood sugar.  I called her and spoke to her at 1725.  The patient started to feel weak and a little dizzy when she walked out of the office.  However she ate a protein bar.  She just checked her sugar it was 113.  Is informed to monitor her sugar still on a regular basis.  To write down the low and reported back to Dr. Hutson as well.    Addendum by Tho Kirby MD on April 07, 2020 10:30 CDT (Verified)  Patient's laboratory review on 4/6/2020 shows a normal white count, she is mildly anemic with MCV of 99.  With a normal differential.  She had a low glucose.  The rest of her chemistry was unremarkable.  Her transaminitis only shows a mild AST elevation of 66.  No bilirubin elevation.  And her thyroid function is normal.  With patient's anemia on her next blood work we will recheck a reticulocyte count, iron panel, B12 and folate.  We will plan for single agent Abraxane based chemotherapy.  We will use 260 mg meter squared IV day 1 with 21-day cycle to prevent travel      The above addendum's are since patient was seen on her last note.  Seen on April 6, 2020.      Review of Systems    Constitutional:  Sweats, Weakness, No fever, No chills.    Eye:  Recent visual problem.    Ear/Nose/Mouth/Throat:  Tinnitus, No vertigo.         Nasal obstruction: Left.    Respiratory:  Shortness of breath, No cough, No sputum production, No hemoptysis, No wheezing.    Cardiovascular:  No chest pain, No palpitations, No peripheral edema, No syncope.    Breast:  Negative.    Gastrointestinal:  Heartburn, No nausea, No vomiting, No diarrhea, No constipation, No hematemesis.    Genitourinary:  No dysuria, No hematuria.    Hematology/Lymphatics:  Bruising tendency, No bleeding tendency, No swollen lymph glands.    Endocrine:  Cold intolerance.    Immunologic:  Chemotherapy, Malaise, Not immunocompromised.    Musculoskeletal:  Joint pain, No decreased range of motion.    Integumentary:  Skin lesion, No rash, No pruritus.    Neurologic:  Alert and oriented X4, No abnormal balance, No confusion, No numbness, No tingling, No headache.    Psychiatric:  Anxiety, Psych care.       Health Status   Allergies  Adhesivesitching  Shellfish, anaphylaxis  Latex and rubber,-- itching  Adverse reaction to promethazine         Home medications: Tylenol, acidophilus, Xanax, Lotrel, BuSpar, vitamin D3, B12, Flexeril, Robitussin, iron, loratadine, Mobic, Ocuvite, Naprosyn, Bystolic, Zofran, potassium,                        Allergies:    Allergic Reactions (Selected)  Severity Not Documented  Iodine- Rash, swelling and throat swells.  Latex- Rash.  Norco- Dillusions, insomnia and itching.  Phenergan- Insomnia.  Shellfish- Rash, throat closes and face swells.  Tape- Red rash and gang green.   Current medications:  (Selected)   Inpatient Medications  Ordered  DuoNeb 0.5 mg-2.5 mg/3 mL inhalation solution: 3 mL, NEB, Once  Versed: 1 mg, 1 mL, IV Push, Once  Future  Abraxane (for IVPB): 470 mg, IV Piggyback, Once-chemo, Days 1  Aloxi (for IVPB): 0.25 mg, IV Piggyback, Once-chemo, Days 1  Benadryl 50mg/ml Inj: 25 mg, IV Piggyback,  Once-chemo, Days 1  Heparin Flush 100 U/mL - 5 mL: 500 units, IV Push, Once-chemo, Days 1  Zantac 50 mg/NS 50 mL IVPB: 50 mg, IV Piggyback, Once-chemo, Days 1  dexamethasone (for IVPB): 10 mg, IV Piggyback, Once-chemo, Days 1  Prescriptions  Prescribed  NovoLOG FlexPen 100 units/mL injectable solution: See Instructions, Subcutaneous TIDAC, 3 mL, 0 Refill(s)  Zofran 4 mg oral tablet: 4 mg, 1 tab(s), Oral, q6hr, PRN, PRN: as needed for nausea/vomiting, 30 tab(s), 0 Refill(s)  metFORMIN 1000 mg oral tablet: 1,000 mg, 1 tab(s), Oral, BID, 60 tab(s), 0 Refill(s)  Documented Medications  Documented  Acidophilus oral tablet:   AcipHex 20 mg ([facility] Substitution): 40 mg, Oral, Daily, 30  Aleve 220 mg oral tablet: 1 cap(s), Oral, q8hr, PRN: as needed for pain, 40 cap(s), 0 Refill(s)  Ambien 10 mg oral tab -[facility]: 1 tab(s), Oral, Once a day (at bedtime), PRN: as needed for insomnia, 0 Refill(s)  Bystolic 10 mg oral tablet: 10 mg, 1 tab(s), Oral, Daily, 30 tab(s)  Centrum Women's oral tablet: 1 tab(s), Oral, Daily, 0 Refill(s)  Feosol 200 mg (65 mg elemental iron) oral tablet: 200 mg, 1 tab(s), Oral, Daily, 0 Refill(s)  Flexeril 5 mg oral tablet: 5 mg, 1 tab(s), Oral, Daily, prn at bedtime, 30 tab(s), 0 Refill(s)  Lubricant Eye Drops ophthalmic solution: 1 drop(s), Eye-Both, BID, PRN: as needed for dry eyes, 30 EA, 0 Refill(s)  Misc Prescription: Biotin 10,000 mcg daily, 0 Refill(s)  Singulair 10 mg oral TABLET: 10 mg, 1 tab(s), Oral, qPM, 30 tab(s)  Soliqua 100/33 subcutaneous solution: 30 units q am, 0 Refill(s)  Symbicort 160 mcg-4.5 mcg/inh inhalation aerosol: 2 puff(s), INH, BID, 0 Refill(s)  Trintellix 5 mg oral tablet: 5 mg, 1 tab(s), Oral, Daily, 30 tab(s), 0 Refill(s)  Valium 5 mg oral tablet: 5 mg, 1 tab(s), Oral, At Bedtime, 30 tab(s), 0 Refill(s)  Vitamin B12 Injection -[facility]: qMonth  Vitamin D2 50,000 intl units oral capsule: 0 Refill(s)  Vitamin D3 5000 intl units (125 mcg) oral capsule: 5,000 IntUnit, 1 cap(s), Oral,  Daily, with food, 100 cap(s), 0 Refill(s)  alPRAzolam 0.5 mg oral tablet: 0.5 mg, 1 tab(s), Oral, At Bedtime, PRN: for anxiety  amlodipine-benazepril 10 mg-20 mg oral capsule: 1 cap(s), Oral, Daily  busPIRone 7.5 mg oral tablet: 7.5 mg, 1 tab(s), Oral, BID, 0 Refill(s)  loratadine 10 mg oral capsule: 10 mg, 1 cap(s), Oral, Daily  magnesium oxide 400 mg oral tablet: 400 mg, 1 tab(s), Oral, Daily, 0 Refill(s)  meloxicam 15 mg oral tablet: 15 mg, 1 tab(s), Oral, Daily, 30 tab(s), 0 Refill(s)  potassium chloride 20 mEq oral TABLET extended release: 20 mEq, 1 tab(s), Oral, Daily, 30 tab(s)  traMADol 50 mg oral tablet: 50 mg, 1 tab(s), Oral, TID, prn, PRN: pain, 30 tab(s), 0 Refill(s)      Histories      Past medical history:   Hypertension, GERD, anxiety, depressive disorder, valvular heart disease (mitral regurg tricuspid regurg)  Sleep apnea, chronic sinusitis,    Past surgical history  March 15, 2018 thoracotomy with exploration  11/2017 EBUS  2014 bunionectomy  Eyelid surgery 2014  Cholecystectomy 2005  Carpal tunnel release 2001  Partial sclerectomy 2000  Tonsillectomy 1974  Hernia repair 2002 in 2006  Refractive surgery  Stomach stapling gastric bypass      Social history: Smoker 32 years  1 drink a day  No drug use    Family history maternal grandmother GI cancer  Mother had neck carcinoma  Glaucoma macular degeneration runs in the family         Physical Examination   Vital Signs   4/22/2020 10:19 CDT      Temperature Oral          36.8 DegC                             Temperature Oral (calculated)             98.24 DegF                             Peripheral Pulse Rate     88 bpm                             Systolic Blood Pressure   114 mmHg                             Diastolic Blood Pressure  79 mmHg     Measurements from flowsheet : Measurements   4/22/2020 10:19 CDT      Weight Dosing             75.3 kg                             Weight Measured           75.3 kg                             Weight Measured  and Calculated in Lbs     166.01 lb                             Height/Length Dosing      158 cm                             Height/Length Measured    158 cm                             BSA Measured              1.82 m2                             Body Mass Index Measured  30.16 kg/m2    4/21/2020 22:35 CDT      Weight Measured           79.3 kg                             Weighing Method           Standing                             Height/Length Measured    158 cm                             Usual Weight              78 kg                             Body Mass Index Measured  31.77 kg/m2                             Ideal Body Weight         50 kg                             Percent Ideal Weight      159 %     General:  Alert and oriented, No acute distress.    Eye:  Pupils are equal, round and reactive to light, Extraocular movements are intact, Normal conjunctiva.    HENT:  Normocephalic, Normal hearing, Oral mucosa is moist, No pharyngeal erythema.    Neck:  Supple, No jugular venous distention, No lymphadenopathy, No thyromegaly.    Respiratory:  Respirations are non-labored, No chest wall tenderness.    Cardiovascular:  Normal rate, Regular rhythm, No murmur, No gallop, Good pulses equal in all extremities, Normal peripheral perfusion.    Gastrointestinal:  Soft, Non-tender, Non-distended, Normal bowel sounds, No organomegaly.    Lymphatics:  No lymphadenopathy neck, axilla, groin.    Musculoskeletal:  Normal range of motion, No tenderness, No swelling, No deformity, Normal gait.    Integumentary:  Warm, Intact, No pallor, No rash.    Neurologic:  Alert, Oriented, Normal motor function, No focal deficits, Cranial Nerves II-XII are grossly intact, Normal deep tendon reflexes.    Cognition and Speech:  Oriented, Speech clear and coherent, Functional cognition intact.    Psychiatric:  Cooperative, Appropriate mood & affect, Normal judgment.    ECOG Performance Scale: 0 - Fully active; no performance  restrictions.      Review / Management   Pathology  (J) LUNG, RIGHT MIDDLE LOBE, LOBECTOMY:  TUMOR TYPE: SQUAMOUS CELL CARCINOMA, MODERATELY DIFFERENTIATED.      Examination: CT CHEST ABDOMEN W CONTRAST, 3/30/2020 2:14 PM  Lungs and Airways: The patient is status post right middle lobectomy. Stable post radiation findings are noted in the right lung. There is interval decrease in size of pulmonary nodules. For example, a right upper lobe nodule measures 6 mm, previously 9 mm. 3 mm left upper lobe nodule was 7 mm. Right lower lobe nodule measures 1.3 cm, previously 2.2 cm. There is subsegmental atelectasis in the left lower lobe.  Heart and Mediastinum: Multiple mediastinal lymph nodes have decreased in size. For example, a right paratracheal lymph node measuring 7 mm was previously 1.3 cm. Another right paratracheal lymph node measures 6 mm, previously 1.4 cm. A left paratracheal lymph node is also smaller. The heart and pericardium are within normal limits.  There is moderate atherosclerotic calcification of the coronary arteries and aorta.  Abdomen: The patient is status post cholecystectomy. There are stable findings of prior gastric surgery. Hypodense renal lesions are stable. There is atherosclerotic calcification of the abdominal aorta. A hypodense right hepatic lesion is stable. Dilatation of the biliary duct is unchanged. A 1.3 cm a left adrenal nodule is unchanged. There is  Bones:  The visualized bony thorax shows degenerative changes. The patient is status post right posterior thoracotomy. Sclerotic lesion in T5 vertebral body is unchanged, may represent a bone island  IMPRESSION: Decrease in size of mediastinal lymphadenopathy and pulmonary metastases   Laboratory Results   Today's Lab Results : PowerNote Discrete Results   4/22/2020 10:32 CDT      POC TCO2                  26.0 mmol/L                             POC Hb                    12.6 mg/dL                             POC Hct                    37.0 %  LOW                             POC Sodium                137 mmol/L  LOW                             POC Potassium             4.3 mmol/L                             POC Chloride              102 mmol/L                             POC Ion Calcium           1.11 mmol/L  LOW                             POC Glucose               277 mg/dL  HI                             POC BUN                   10.0 mg/dL                             POC Creatinine            0.7 mg/dL                             POC AGAP                  14.0  NA    4/22/2020 10:01 CDT      WBC                       5.4 x10(3)/mcL                             RBC                       3.76 x10(6)/mcL  LOW                             Hgb                       11.3 gm/dL  LOW                             Hct                       36.6 %  LOW                             Platelet                  309 x10(3)/mcL                             MCV                       97.3 fL  HI                             MCH                       30.1 pg                             MCHC                      30.9 gm/dL  LOW                             RDW                       12.3 %                             MPV                       9.6 fL                             Abs Neut                  3.72 x10(3)/mcL                             NEUT%                     69.4 %  NA                             NEUT#                     3.7 x10(3)/mcL                             LYMPH%                    16.8 %  NA                             LYMPH#                    0.9 x10(3)/mcL                             MONO%                     8.0 %  NA                             MONO#                     0.4 x10(3)/mcL                             EOS%                      4.9 %  NA                             EOS#                      0.3 x10(3)/mcL                             BASO%                     0.7 %  NA                             BASO#                     0.0 x10(3)/mcL      , Last 10 Days Lab Results : PowerNote Discrete Results   4/6/2020 15:24 CDT       Sodium Lvl                142 mmol/L                             Potassium Lvl             4.4 mmol/L                             Chloride                  106 mmol/L                             CO2                       27 mmol/L                             Calcium Lvl               8.8 mg/dL                             Glucose Lvl               38 mg/dL  CRIT                             BUN                       13.0 mg/dL                             Creatinine                0.74 mg/dL                             eGFR-AA                   101  NA                             eGFR-EMELYN                  83  NA                             Bili Total                0.3 mg/dL                             Bili Direct               0.1 mg/dL                             Bili Indirect             0.20 mg/dL                             AST                       66 unit/L  HI                             ALT                       53 unit/L                             Alk Phos                  94 unit/L                             Total Protein             6.3 gm/dL                             Albumin Lvl               3.6 gm/dL                             Globulin                  2.7 gm/dL                             A/G Ratio                 1.3 ratio                             TSH                       1.5916 uIU/mL    4/6/2020 15:15 CDT       WBC                       6.1 x10(3)/mcL                             RBC                       3.42 x10(6)/mcL  LOW                             Hgb                       10.3 gm/dL  LOW                             Hct                       34.0 %  LOW                             Platelet                  281 x10(3)/mcL                             MCV                       99.4 fL  HI                             MCH                       30.1 pg                             MCHC                       30.3 gm/dL  LOW                             RDW                       13.3 %                             MPV                       9.1 fL  LOW                             Abs Neut                  3.86 x10(3)/mcL                             NEUT%                     62.9 %  NA                             NEUT#                     3.9 x10(3)/mcL                             LYMPH%                    18.9 %  NA                             LYMPH#                    1.2 x10(3)/mcL                             MONO%                     10.4 %  NA                             MONO#                     0.6 x10(3)/mcL                             EOS%                      6.8 %  NA                             EOS#                      0.4 x10(3)/mcL                             BASO%                     0.8 %  NA                             BASO#                     0.0 x10(3)/mcL           Impression and Plan   1.   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,  2.  DKA new onset diabetes secondary to immunotherapy  3.  Abnormal thyroid function test  4.  Mild cytopenias secondary to chemotherapy      I discussed this case with the patient's oncologist in [place],.  We discussed the pros and cons of continued chemotherapy versus observation.  We discussed the side effects of chemotherapy  .  The above conversation was discussed with the patient as well.  The options of observation ,single agent , doublet chemotherapy, toxicity associated with the above.  Risk of immunosuppression with multiple agents.  And the goal is palliation.  we would not resume immunotherapy with DKA risk at this point    The side effects of chemotherapy were discussed.  Including but not limited to: Nausea, vomiting, alopecia, neuropathy, neutropenia, blood transfusion, nephropathy, secondary malignancies, congestive heart failure, allergic reactions to name a few. patient with the opportunity to have questions  answered.      Plan:   Continue to follow-up with endocrinology   2 cycles of single agent Abraxane followed by repeat evaluation  hold singular  hold M-Vit/vit d Biotone          hTo Kirby MD

## 2022-04-30 NOTE — OP NOTE
DATE OF SURGERY:        SURGEON:  LUCIANA Fontaine MD    PROCEDURE:  Bronchoscopy with endobronchial biopsy and EBUS with R4 node aspiration.    PREOPERATIVE DIAGNOSIS:  Right middle lobe mass.    POSTOPERATIVE DIAGNOSIS:  Squamous cell carcinoma of the lung (with preliminary).    PROCEDURE IN DETAIL:  After informed consent obtained from the patient by Dr. Lama, she was prepped and draped in the usual fashion for bronchoscopy.  Anesthesia placed an LMA.  Once that was completed, the Olympus bronchoscope was inserted to do a endobronchial survey.  The scope was entered through the vocal cords, which were normal pre and post bronchoscopy.  The trachea was normal, the right upper lobe was normal.  The right lower lobe takeoff and right middle lobe takeoff was normal.  There were no endobronchial lesions, however in the right middle lobe lateral segment there was a tumor which appeared to be relatively vascular in the endobronchial tree.  Photographs of this area were attached.  Dr. Efrain Ivan was available and I had him come by and take a look so he could see the anatomy and see whether or not he thought this might be carcinoid.  We both after looking at it a considerable amount of time felt that this was not likely carcinoid, so I then proceeded with biopsies X5, one was sent for a quick read, which came back positive for squamous cell carcinoma per Dr. Brent Virgen.  The scope was then entered into the left main stem upper lobe lingula and lower lobe, there were no mucosal abnormalities.  Once that was completed, I took the scope out, took the EBUS scope, placed it and did an taylor survey.  There was a R4 node of about 1 cm, this was aspirated X2.  R7 station was looked and R10, could not see any significant adenopathy to sample.  At this time the procedure was then terminated.  The family was counseled.    PLAN:  Will be to get PET scan, PFT and if all is negative, then consider  resection.        ______________________________  G. MD YASMINE Lao/SALAZAR  DD:  10/19/2017  Time:  10:37AM  DT:  10/19/2017  Time:  10:58AM  Job #:  [id number]

## 2022-04-30 NOTE — PROGRESS NOTES
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 CXR reveals right lung mass.   10/6/2017 CT the chest with contrast 6.9 by 3.6 by 3.1 cm right middle lobe mass invading the right middle lobe bronchus, 2mm nodule left apex   10/19/2017 Bronchoscopy, EBUS with 4R biopsy demonstrating squamous cell carcinoma. 4R lymph node negative.   10/26/2017 PET CT right middle lobe lung mass measuring 6.8cm with SUV 18.8 extending into the right hilum. Right 11th rib with a displaced fracture SUV 4.1. Soft tissue throughout left   maxillary sinus with extension into left nasal cavity   MRI of the Brain is negative for intracranial metastases but showed a left maxillary sinus mass extending in to the left nasal cavity.    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 This needs to be removed since there is a small chance this could be harboring an invasive   malignancy after the definitive treatments for her lung cancer.    An EBUS done outside was inconclusive.    EBUS done  [facility] was Negative for N2 disease.    The final stage is T4N0M0 (Stage III) Squamous cell Carcinoma of the lung. Plan is to do induction chemotherapy followed by surgery. The patient did not qualify for the cisplatin, docetaxel   and Nintedanib secondary to the central nature of the tumor and the presence of vascular invasion.   2017 - 2018 Chemotherapy/3 cycles of carboplatin and Abraxane  2018: Completed definitive  radiation therapy to the Chest 05/23/18.   10/25/19. Newly noted bilateral pulmonary nodules Biopsy confirms squamous cell carcinoma.   11/2019. The patient has NFE2L2 mutation and thus enrolled on the glutaminase inhibitor trial run by Dr. Bertrand Smith.    Right lung has progressed after 2 cycles of treatment with [id number] (Glutimase inhibitor).   PD-L1 80%. Initiated on single agent Pembrolizumab on 02/07/2020 --3/2020  - Restaging s/p c#2 showed good treatment response, how ever developed DKA requiring ICU admission and continuation of insulin.    Abraxane X 2 cycles (4/22/20)--- 7/15/2020:   6/2/20: Repeat CTs showed good response to treatment  7/31/20: CT C/A/P/ brain:CT the brain no evidence of abnormality, CT of the chest abdomen and pelvis, compared to outside CT of the chest in June, and abdomen pelvis in March,   New superior left lower lobe 1 cm spiculated nodule, progressive subcarinal lymphadenopathy 3.2 x 1.4 cm compared to 2.3 x 1.2 cm, unchanged right hepatic cyst, hypodensity lesion in the   liver. Changed adrenal nodule,  4/22/20-7/15/20: Abraxane q 3 weeks  9/11/20: MRI of the brain- no evidence of cranial metastases  9/4/20:PET/CT- 2 new pulmonary nodes seen on the 7/31/2020 chest CT mildly smaller today do not have increased FDG activity, although the small size does limit PET sensitivity, probe to borderline enlarged anterior paratracheal and gastropathic lymph nodes are mild hypermetabolism.  Nonspecific.    9/8/20: Gemzar 1000 mg/m² day 1, day 8 q 3 weeks--started    10/14/2020: CT-:Right middle lobe changes expected postsurgical scarring and radiation changes stable, pulmonary parenchymal metastatic disease likely improved compared to 9/ 4/2020 within limitations of comparison but clearly improved from 7/29/2020 minimal ground-glass opacities represent sequelae of resolving metastases infection or inflammatory process, other stable findings   Gemzar 1000 mg/m² day 1, day 8 q 3 weeks- September 8,  2020--- February 24, 2021--stopped due to progression see below  3/11/2021: MRI of the brain, no evidence of intracranial metastases    3/10/2021: PET CT scan, FDG avid right paratracheal and bilateral hilar adenopathy represent neoplastic involvement or reactive process.  There is a left gastric and left retroperitoneal adenopathy suspicious for focal metastases.  Stable residual lung metastases are not avid, and the C4 metastases is not avid.  She has progressive disease in the lower abdomen.  She been recommended for phase 1 trial if clinical trial is not an option the consideration of docetaxel ramucirumab was made to the patient.  Liquid biopsy for molecular profiling was done an MRI of the brain.  We will discontinue Gemzar and follow is the MD Anders note    4/5/2021: Docetaxel /ramucirumab     5/12/21: CT CAP after 2 cycles of Docetaxel/Ramucirumab   6/24 2021: CT CAP MD Mcnamara, there are no CT findings of local recurrent malignancy within the radiated right lung, the intrathoracic and upper abdominal lymph nodes have decreased in size.  However there is a retroperitoneal lymph node that is slightly increased in size raising the possibility of a taylor metastases.  There are bilateral pulmonary nodules consistent with metastases that have increased in size slowly.  Soft tissue within the mesentery is unchanged.  This may be inflammatory versus peritoneal disease.  There are no systemic extrathoracic metastases.  (Pulmonary nodules 9 mm versus 6 mm, 9 mm versus 6 mm, 6 mm versus 5 mm, 5 mm versus 4 mm, glass opacity 2 cm unchanged      Interval History   This patient had her fourth cycle of chemotherapy on 6/8/2021.Follow-up with MD Mcnamara on 6/23. She has an appointment with ophthalmologist in August 2021. She states she had visit with Cardiologist recently, her heart is okay but US BLE veins done to determine if BLE edema due to any blockages in LE's and in office procedure will be considered. She had  some mouth sores after her last treatment, which resolved after using equal parts of Benadryl and Mylanta to swish around her mouth.   I, Anitha White LPN, acted solely as a scribe for and in the presence of Dr. Tho Kirby who performed this service.      Review of Systems   Constitutional:  No chills, No sweats, No weakness, No decreased activity.    Eye:  Recent visual problem, watery eyes, No blurring, No double vision.    Ear/Nose/Mouth/Throat:  No nasal congestion, No sore throat.    Respiratory:  No shortness of breath, No hemoptysis, No wheezing.    Cardiovascular:  Peripheral edema, No chest pain, No palpitations, No bradycardia.    Breast:  Nipple discharge.    Gastrointestinal:  Heartburn, No nausea, No vomiting, No diarrhea, No constipation.    Genitourinary:  No dysuria, No hematuria.    Hematology/Lymphatics:  Bruising tendency, Bruise.    Endocrine:  He has been having waxing and waning blood sugars..    Immunologic:  Chemotherapy.    Musculoskeletal:  No joint pain, No muscle pain.    Integumentary:  No rash, No pruritus.    Neurologic:  Alert and oriented X4, Numbness, Tingling, No abnormal balance, No confusion, No headache.    Psychiatric:  Negative.       Health Status   Allergies:    Allergic Reactions (Selected)  Severity Not Documented  Iodine- Rash, swelling and throat swells.  Latex- Rash.  Norco- Dillusions, insomnia and itching.  Phenergan- Insomnia.  Shellfish- Rash, throat closes and face swells.  Tape- Red rash and gang green.,    Allergies (6) Active Reaction  iodine throat swells  Latex Rash  Norco insomnia  Phenergan insomnia  shellfish rash  Tape red rash     Current medications:  (Selected)   Outpatient Medications  Ordered  DuoNeb 0.5 mg-2.5 mg/3 mL inhalation solution: 3 mL, form: Soln, NEB, Once, first dose 10/19/17 8:38:00 CDT, stop date 10/19/17 8:38:00 CDT  Heparin Flush 100 U/mL - 5 mL: 500 units, form: Injection, IV Push, Once-chemo, first dose 07/15/20 10:47:00 CDT,  stop date 07/15/20 10:47:00 CDT, Routine, Days 1  Versed: 1 mg, form: Injection, IV Push, Once, first dose 10/19/17 8:00:00 CDT, stop date 10/19/17 8:00:00 CDT, 1 to 2 mg initial then 1 mg every 2 min until sedation level 2 achieved, 24  Prescriptions  Prescribed  Lantus Solostar Pen 100 units/mL subcutaneous solution: 12 units, Subcutaneous, Daily, # 15 mL, 3 Refill(s), Pharmacy: [facility], 161, cm, Height/Length Dosing, 05/20/20 14:05:00 CDT, 70.3, kg, Weight Dosing, 05/20/20 14:05:00 CDT  Vitamin D3 1000 intl units oral tablet: 1,000 IntUnit = 1 tab(s), Oral, Daily, # 60 tab(s), 0 Refill(s), other reason (Rx)  Zofran 8 mg oral tablet: See Instructions, 1 tab(s) Oral TID prn N+V, # 30 tab(s), 1 Refill(s), Pharmacy: [facility], 161, cm, Height/Length Dosing, 02/03/21 12:46:00 CST, 61.4, kg, Weight Dosing, 02/03/21 12:46:00 CST  Zofran 8 mg oral tablet: See Instructions, 1 tab(s) Oral TID prn N+V, # 30 tab(s), 1 Refill(s), Pharmacy: [facility], 161, cm, Height/Length Dosing, 06/08/21 12:09:00 CDT, 60.1, kg, Weight Dosing, 06/08/21 12:09:00 CDT  dronabinol 2.5 mg oral capsule: 2.5 mg = 1 cap(s), Oral, Once a day (at bedtime), # 30 cap(s), 2 Refill(s), Pharmacy: [facility], 161, cm, Height/Length Dosing, 11/19/20 15:21:00 CST, 59, kg, Weight Dosing, 11/19/20 15:21:00 CST  metFORMIN 1000 mg oral tablet: 1,000 mg = 1 tab(s), Oral, BID, # 180 tab(s), 1 Refill(s), Pharmacy: [facility], 161, cm, Height/Length Dosing, 01/27/21 10:17:00 CST, 59.4, kg, Weight Dosing, 01/27/21 10:17:00 CST  mirtazapine 30 mg oral tablet: See Instructions, TAKE ONE TABLET BY MOUTH AT BEDTIME, # 90 tab(s), 3 Refill(s), Pharmacy: [facility], 161, cm, Height/Length Dosing, 02/24/21 12:11:00 CST, 59.8, kg, Weight Dosing, 02/24/21 12:11:00 CST  montelukast 10 mg oral TABLET: 10 mg = 1 tab(s), Oral, Daily, # 30 tab(s), 3 Refill(s), Pharmacy: [facility], 161, cm,  Height/Length Dosing, 05/17/21 12:25:00 CDT, 62.7, kg, Weight Dosing, 05/17/21 12:25:00 CDT  ropinirole 0.5 mg oral tablet: See Instructions, TAKE ONE TABLET BY MOUTH 1 to 3 hours before bedtime, # 30 tab(s), 5 Refill(s), Pharmacy: [facility], 161, cm, Height/Length Dosing, 11/24/20 9:08:00 CST, 59, kg, Weight Dosing, 11/24/20 9:08:00 CST  traMADol 50 mg oral tablet: See Instructions, 1 tab(s) Oral q6hr prn pain, # 30 tab(s), 0 Refill(s), Pharmacy: [facility], 161, cm, Height/Length Dosing, 04/27/21 14:44:00 CDT, 60.5, kg, Weight Dosing, 04/27/21 14:44:00 CDT  Documented Medications  Documented  Aciphex 20 mg oral EC tablet (pt. own): 20 mg = 1 tab(s), Oral, Daily, 0 Refill(s)  Baqsimi Two Pack 3 mg nasal powder: 3 mg, Nasal, As Directed  BuSpar 7.5 mg oral tablet: = 1 tab(s), Oral, TID, # 90 tab(s), 0 Refill(s)  Bystolic 10 mg oral tablet: 10 mg = 1 tab(s), Oral, Daily, # 30 tab(s), 0 Refill(s)  Centrum Women's oral tablet: 1 tab(s), Oral, Daily, 0 Refill(s)  Cequa 0.09% ophthalmic solution: 1 drop(s), OPTH, BID  Lubricant Eye Drops ophthalmic solution: 1 drop(s), Eye-Both, BID, PRN PRN as needed for dry eyes, # 30 EA, 0 Refill(s)  Lyumjev KwikPen 100 units/mL injectable solution: See Instructions, sliding scale, 0 Refill(s)  Symbicort 160 mcg-4.5 mcg/inh inhalation aerosol: 2 puff(s), INH, BID, PRN PRN wheezing and sob, 0 Refill(s)  Trintellix 20 mg oral tablet: 20 mg = 1 tab(s), Oral, Daily  VFC: Prevnar 13 Intramuscular susp.: 0.5 mL, IM, Once  Vitamin B12 Injection -CCA: qMonth, 0 Refill(s)  alPRAzolam 0.5 mg oral tablet: 0.5 mg = 1 tab(s), Oral, At Bedtime, PRN for anxiety, 0 Refill(s)  albuterol-ipratropium 2.5 mg-0.5 mg/3 mL inhalation solution: 3 mL, NEB, q6hr, PRN PRN sob or wheezing  amlodipine-benazepril 10 mg-20 mg oral capsule: 1 cap(s), Oral, Daily, 0 Refill(s)  dexamethasone 4 mg oral tablet: See Instructions, 2 tab(s) Oral with supper night before chemotherapy and tab 1 PO  with breakfast and tab 1 with lunch for 3 days after chemotherapy., # 48, 0 Refill(s)  diphenhydrAMINE 12.5 mg/5 mL ORAL liquid: 12.5 mg = 5 mL, Oral, q6hr  loratadine 10 mg oral capsule: 10 mg = 1 cap(s), Oral, Daily, 0 Refill(s)  meloxicam 15 mg oral tablet: 15 mg = 1 tab(s), Oral, Daily, 0 Refill(s)  potassium chloride 20 mEq oral TABLET extended release: 20 mEq = 1 tab(s), Oral, Daily  temazepam 30 mg oral capsule: 30 mg = 1 cap(s), Oral, qPM   Problem list:    All Problems  Acid reflux / 7735589061 / Confirmed  Anemia / 278131507 / Confirmed  Asthma / 171316124 / Confirmed  Vitamin B12 deficiency / 911966095 / Confirmed  Low vitamin D level / 804552018 / Confirmed  Diabetes type 2 on insulin / 7789104229 / Confirmed  History of gastric bypass / 6702551125 / Confirmed  Hypertension / 39280427 / Confirmed  Depression with anxiety / 716303543 / Confirmed  Neutropenia due to and following chemotherapy / 8817791397 / Confirmed  NIKI on CPAP / 218452549 / Confirmed  Osteopenia / 901591609 / Confirmed  Stage IV squamous cell carcinoma of lung / 438017165 / Confirmed  Chemotherapy management, encounter for / 978789084 / Confirmed  Type 1 diabetes mellitus / 009301496 / Confirmed  Diabetes mellitus type 2 in nonobese / 3500213800 / Confirmed  Canceled: Vitamin B12 deficiency / 312794539  Canceled: Abnormal chest CT / 5292135627  Canceled: Family history of lung cancer / 7711489531  Canceled: Heart disease / 68473846  Canceled: Routine health maintenance / 614751470  Canceled: Sleep apnea / 087557367,    Active Problems (16)  Acid reflux   Anemia   Asthma   Chemotherapy management, encounter for   Depression with anxiety   Diabetes mellitus type 2 in nonobese   Diabetes type 2 on insulin   History of gastric bypass   Hypertension   Low vitamin D level   Neutropenia due to and following chemotherapy   NIKI on CPAP   Osteopenia   Stage IV squamous cell carcinoma of lung   Type 1 diabetes mellitus   Vitamin B12 deficiency          Histories   Past Medical History:    Active  Diabetes type 2 on insulin ([id number])   Family History:    No family history items have been selected or recorded.   Procedure history:    Mammogram ([id number]) on 12/1/2020 at 65 Years.  Comments:  12/16/2020 10:05 Omaira Colon  [facility]  Port Insertion on 11/2/2020 at 65 Years.  Mammogram ([id number]) on 1/1/2019 at 63 Years.  Bronchoscopy w/ Needle Aspir Biopsy(s) on 10/19/2017 at 62 Years.  Comments:  10/19/2017 13:24 Chasity Mistry RRT C.  auto-populated from documented surgical case  Bronchoscopy w/ Endobronch Biopsy(s) on 10/19/2017 at 62 Years.  Comments:  10/19/2017 13:24 LAZARO Barrios RRT Chasity C.  auto-populated from documented surgical case  Bronchoscopy with Brushings on 10/19/2017 at 62 Years.  Comments:  10/19/2017 13:24 LAZARO Barrios RRT Chasity C.  auto-populated from documented surgical case  Bronchoscopy, Ebus, 2 or Less Samples on 10/19/2017 at 62 Years.  Comments:  10/19/2017 13:24 LAZARO Barrios RRT Chasity C.  auto-populated from documented surgical case  gastric sleeve and bypass.  cheryl fundiplication.  bilatteral bunnionectomy.  bilatteral carpel tunnel repair.  lasix eye surgery.  partial hysterectomy.  cholecysectomy.  tonsillectomy.  hemmorhedectomy.  dexcom 6 glucose monitoring.   Social History        Social & Psychosocial Habits    Alcohol  02/06/2015 Risk Assessment: Denies Alcohol Use    05/17/2021  Use: Current    Type: Liquor    Frequency: 1-2 times per week    Substance Use  02/06/2015 Risk Assessment: Denies Substance Abuse    05/17/2021  Use: Never    Tobacco  02/06/2015 Risk Assessment: Denies Tobacco Use    06/08/2021  Use: Former smoker, quit more    Patient Wants Consult For Cessation Counseling N/A    Comment: quit 30+ years ago. - 01/13/2021 13:10 - Brad GIORDANO, Shannan CARRIZALES    Abuse/Neglect  06/08/2021  SHX Any signs of abuse or neglect No    Spiritual/Cultural  07/15/2020  Islam  Preference [facility]  07/15/2020  [place] of  Never in   .        Physical Examination   Vital Signs   6/28/2021 9:21 CDT       Temperature Oral          36.3 DegC                             Temperature Oral (calculated)             97.34 DegF                             Peripheral Pulse Rate     60 bpm                             Respiratory Rate          18 br/min                             SpO2                      97 %                             Systolic Blood Pressure   142 mmHg  HI                             Diastolic Blood Pressure  58 mmHg  LOW                             Blood Pressure Location   Left arm                             Manual Cuff BP            No                             O2 SAT at rest            97 %     Measurements from flowsheet : Measurements   6/28/2021 9:21 CDT       Weight Dosing             59.200 kg                             Weight Measured           59.2 kg                             Weight Measured and Calculated in Lbs     130.51 lb                             Height/Length Dosing      161.00 cm                             Height/Length Measured    161 cm                             BSA Measured              1.63 m2                             Body Mass Index Measured  22.84 kg/m2     General:  Alert and oriented, No acute distress.    Eye:  Pupils are equal, round and reactive to light, Extraocular movements are intact, Normal conjunctiva.    HENT:  Normocephalic, Tympanic membranes are clear, Normal hearing, Oral mucosa is moist, No pharyngeal erythema.    Neck:  Supple, Non-tender, No jugular venous distention, No lymphadenopathy.    Respiratory:  Lungs are clear to auscultation, Respirations are non-labored, Symmetrical chest wall expansion.    Cardiovascular:  Normal rate, Regular rhythm, No gallop.         Edema: 2+.    Gastrointestinal:  Soft, Non-tender, Non-distended, Normal bowel sounds.    Lymphatics:  No lymphadenopathy neck, axilla,  groin.    Musculoskeletal:  Normal range of motion.    Integumentary:  Warm, Intact, No pallor, Alopecia, Darkening of the nailbeds.    Neurologic:  Alert, Oriented, No focal deficits, Cranial Nerves II-XII are grossly intact, Normal deep tendon reflexes.    Cognition and Speech:  Oriented, Speech clear and coherent, Functional cognition intact.    Psychiatric:  Cooperative, Appropriate mood & affect, Normal judgment.    ECOG Performance Scale: 0 - Fully active; no performance restrictions.      Review / Management   Results review:  Lab results   6/28/2021 9:23 CDT       Est Creat Clearance Ser   91.85 mL/min    6/28/2021 9:13 CDT       POC TCO2                  28.0 mmol/L                             POC Hb                    11.9 mg/dL  LOW                             POC Hct                   35.0 %  LOW                             POC Sodium                134 mmol/L  LOW                             POC Potassium             4.7 mmol/L                             POC Chloride              95 mmol/L  LOW                             POC Ion Calcium           1.17 mmol/L                             POC Glucose               396 mg/dL  HI                             POC BUN                   8.0 mg/dL                             POC Creatinine            0.5 mg/dL  LOW                             POC AGAP                  16.0  NA    6/28/2021 9:09 CDT       [facility] Prot [facility]            Trace  .    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   Repeat 2 more cycles ( a total of 6 with repeat imaging)  Even though she had  significant immune toxicity with DKA.  We could consider immunotherapy trial as well if she is intolerant  Stop Iron , magnesium, and zinc supplements   Lower Dexamethasone dose to 1 tab night before chemotherapy, and 1/2 tab BID days 1-3 after chemotherapy    Imaging:  Interval CT CAP on 6/24/21 at [facility], showed SD after 2 more cycles of Docetaxel/Ramucirumab,     RTC in 3 weeks for TD    Labs: CBC, BMP, , Urine protein, iron profile, ferritin, magnesium  Will call her ophthalmologist for evaluation of her excessive tearing and canalicular stenosis  Has a follow-up with MD Mcnamara after 2 more cycles  Will follow up on her bone density results with her primary care  And cardiology is scheduled her with an ultrasound to look at her lower extremities and question of vascular problems.  With this patient being on ramucirumab, she has a higher risk of bleeding during any surgical procedure.  Explained to the patient.    Tho Kirby MD        Other Physicians   [facility] team

## 2022-04-30 NOTE — PROGRESS NOTES
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 CXR reveals right lung mass.   10/6/2017 CT the chest with contrast 6.9 by 3.6 by 3.1 cm right middle lobe mass invading the right middle lobe bronchus, 2mm nodule left apex   10/19/2017 Bronchoscopy, EBUS with 4R biopsy demonstrating squamous cell carcinoma. 4R lymph node negative.   10/26/2017 PET CT right middle lobe lung mass measuring 6.8cm with SUV 18.8 extending into the right hilum. Right 11th rib with a displaced fracture SUV 4.1. Soft tissue throughout left   maxillary sinus with extension into left nasal cavity   MRI of the Brain is negative for intracranial metastases but showed a left maxillary sinus mass extending in to the left nasal cavity.    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 This needs to be removed since there is a small chance this could be harboring an invasive   malignancy after the definitive treatments for her lung cancer.    An EBUS done outside was inconclusive.    [facility] done  [facility] was Negative for N2 disease.    The final stage is T4N0M0 (Stage III) Squamous cell Carcinoma of the lung. Plan is to do induction chemotherapy followed by surgery. The patient did not qualify for the cisplatin, docetaxel   and  Nintedanib secondary to the central nature of the tumor and the presence of vascular invasion.   5/2017 - 2/6/2018 Chemotherapy/3 cycles of carboplatin and Abraxane  5/23/2018: Completed definitive radiation therapy to the Chest 05/23/18.   10/25/19. Newly noted bilateral pulmonary nodules Biopsy confirms squamous cell carcinoma.   11/2019. The patient has NFE2L2 mutation and thus enrolled on the glutaminase inhibitor trial run by Dr. Bertrand Smith.    Right lung has progressed after 2 cycles of treatment with [id number] (Glutimase inhibitor).   PD-L1 80%. Initiated on single agent Pembrolizumab on 02/07/2020 --3/2020  - Restaging s/p c#2 showed good treatment response, how ever developed DKA requiring ICU admission and continuation of insulin.    Abraxane X 2 cycles (4/22/20)--- 7/15/2020:   6/2/20: Repeat CTs showed good response to treatment  7/31/20: CT C/A/P/ brain:CT the brain no evidence of abnormality, CT of the chest abdomen and pelvis, compared to outside CT of the chest in June, and abdomen pelvis in March,   New superior left lower lobe 1 cm spiculated nodule, progressive subcarinal lymphadenopathy 3.2 x 1.4 cm compared to 2.3 x 1.2 cm, unchanged right hepatic cyst, hypodensity lesion in the   liver. Changed adrenal nodule,  4/22/20-7/15/20: Abraxane q 3 weeks  9/11/20: MRI of the brain- no evidence of cranial metastases  9/4/20:PET/CT- 2 new pulmonary nodes seen on the 7/31/2020 chest CT mildly smaller today do not have increased FDG activity, although the small size does limit PET sensitivity, probe to borderline enlarged anterior paratracheal and gastropathic lymph nodes are mild hypermetabolism.  Nonspecific.    9/8/20: Gemzar 1000 mg/m² day 1, day 8 q 3 weeks    10/14/2020: CT-:Right middle lobe changes expected postsurgical scarring and radiation changes stable, pulmonary parenchymal metastatic disease likely improved compared to 9/ 4/2020 within limitations of comparison but clearly improved from  7/29/2020 minimal ground-glass opacities represent sequelae of resolving metastases infection or inflammatory process, other stable findings      Interval History   Patient presents today with her  for treatment visit. She is due for Cycle 7 of Gemzar. She is doing well and tolerating treatment. She was recently seen for endocrinologist for her uncontrolled diabetes. She has mild nausea which is controlled. She doesnt routinely vomit. Her labs are stable.   She has some chronic lower ankle swelling on the right side. She had ultrasound with no evidence of DVT. She denies pain or trauma. She stays on her feet alot.          Review of Systems   Constitutional:  No chills, No sweats, No weakness, No fatigue, No decreased activity.    Eye:  No recent visual problem, No blurring, No double vision.    Ear/Nose/Mouth/Throat:  No nasal congestion, No sore throat.    Respiratory:  Cough, Sputum production, No shortness of breath, No hemoptysis, No wheezing.    Cardiovascular:  No chest pain, No palpitations, No bradycardia, No peripheral edema.    Gastrointestinal:  Heartburn, No vomiting, No diarrhea, No constipation.    Genitourinary:  No dysuria, No hematuria.    Hematology/Lymphatics:  Bruising tendency.    Immunologic:  Chemotherapy.    Musculoskeletal:  No joint pain, No muscle pain.    Integumentary:  No rash, No pruritus.    Neurologic:  Alert and oriented X4, Numbness, Tingling, No abnormal balance, No confusion, No headache.    Psychiatric:  Negative.       Health Status   Allergies:    Allergic Reactions (Selected)  Severity Not Documented  Iodine- Rash, swelling and throat swells.  Latex- Rash.  Norco- Dillusions, insomnia and itching.  Phenergan- Insomnia.  Shellfish- Rash, throat closes and face swells.  Tape- Red rash and gang green.,    Allergies (6) Active Reaction  iodine throat swells  Latex Rash  Norco insomnia  Phenergan insomnia  shellfish rash  Tape red rash     Current medications:  (Selected)    Outpatient Medications  Ordered  DuoNeb 0.5 mg-2.5 mg/3 mL inhalation solution: 3 mL, form: Soln, NEB, Once, first dose 10/19/17 8:38:00 CDT, stop date 10/19/17 8:38:00 CDT  Heparin Flush 100 U/mL - 5 mL: 500 units, form: Injection, IV Push, Once-chemo, first dose 07/15/20 10:47:00 CDT, stop date 07/15/20 10:47:00 CDT, Routine, Days 1  Versed: 1 mg, form: Injection, IV Push, Once, first dose 10/19/17 8:00:00 CDT, stop date 10/19/17 8:00:00 CDT, 1 to 2 mg initial then 1 mg every 2 min until sedation level 2 achieved, 24  Prescriptions  Prescribed  Lantus Solostar Pen 100 units/mL subcutaneous solution: 12 units, Subcutaneous, Daily, # 15 mL, 3 Refill(s), Pharmacy: [facility], 161, cm, Height/Length Dosing, 05/20/20 14:05:00 CDT, 70.3, kg, Weight Dosing, 05/20/20 14:05:00 CDT  Nb Magnesium 500mg Tab 200: Nb Magnesium 500mg Tab 200, See Instructions, take 1 tablet by mouth once a day. stop taking the 400mg tablets, # 30 EA, 5 Refill(s), Pharmacy: [facility], 161, cm, Height/Length Dosing, 07/15/20 10:49:00 CDT, 66.8, kg, Weight Dosing, 07/1...  Trulicity Pen 1.5 mg/0.5 mL subcutaneous solution: 1.5 mg = 0.5 mL, Subcutaneous, qWeek, # 2.5 mL, 11 Refill(s), Pharmacy: [facility], 158, cm, Height/Length Dosing, 04/22/20 15:18:00 CDT, 75.3, kg, Weight Dosing, 04/22/20 15:18:00 CDT  Vitamin D3 1000 intl units oral tablet: 1,000 IntUnit = 1 tab(s), Oral, Daily, # 60 tab(s), 0 Refill(s), other reason (Rx)  Zofran 8 mg oral tablet: See Instructions, 1 tab(s) Oral TID prn N+V, # 30 tab(s), 0 Refill(s), Pharmacy: [facility], 161, cm, Height/Length Dosing, 08/12/20 14:50:00 CDT, 65, kg, Weight Dosing, 08/12/20 14:50:00 CDT  Zofran 8 mg oral tablet: See Instructions, 1 tab(s) Oral TID prn N+V, # 30 tab(s), 1 Refill(s), Pharmacy: [facility], 161, cm, Height/Length Dosing, 01/13/21 13:06:00 CST, 59.4, kg, Weight Dosing, 01/13/21 13:06:00 CST  dronabinol 2.5 mg oral  capsule: 2.5 mg = 1 cap(s), Oral, Once a day (at bedtime), # 30 cap(s), 2 Refill(s), Pharmacy: [facility], 161, cm, Height/Length Dosing, 11/19/20 15:21:00 CST, 59, kg, Weight Dosing, 11/19/20 15:21:00 CST  ferrous gluconate 324 mg oral tablet: 324 mg = 1 tab(s), Oral, Daily, # 100 tab(s), 1 Refill(s), Pharmacy: [facility], 161, cm, Height/Length Dosing, 11/19/20 15:21:00 CST, 59, kg, Weight Dosing, 11/19/20 15:21:00 CST  magnesium oxide base 500 mg oral tablet: 500 mg = 1 tab(s), Oral, BID, # 60 tab(s), 0 Refill(s), Pharmacy: [facility], 161, cm, Height/Length Dosing, 12/16/20 11:42:00 CST, 60, kg, Weight Dosing, 12/16/20 11:42:00 CST  metFORMIN 1000 mg oral tablet: 1,000 mg = 1 tab(s), Oral, BID, # 180 tab(s), 1 Refill(s), Pharmacy: [facility], 161, cm, Height/Length Dosing, 07/15/20 10:49:00 CDT, 66.8, kg, Weight Dosing, 07/15/20 10:49:00 CDT  mirtazapine 30 mg oral tablet: 30 mg = 1 tab(s), Oral, Once a day (at bedtime), # 90 tab(s), 1 Refill(s), Pharmacy: [facility], 161, cm, Height/Length Dosing, 11/19/20 15:21:00 CST, 59, kg, Weight Dosing, 11/19/20 15:21:00 CST  ropinirole 0.5 mg oral tablet: See Instructions, TAKE ONE TABLET BY MOUTH 1 to 3 hours before bedtime, # 30 tab(s), 5 Refill(s), Pharmacy: [facility], 161, cm, Height/Length Dosing, 11/24/20 9:08:00 CST, 59, kg, Weight Dosing, 11/24/20 9:08:00 CST  traMADol 50 mg oral tablet: 50 mg = 1 tab(s), Oral, q6hr, PRN PRN pain, mild, # 30 tab(s), 0 Refill(s), Pharmacy: [facility], 161, cm, Height/Length Dosing, 11/02/20 11:21:00 CST, 62.6, kg, Weight Dosing, 11/02/20 11:21:00 CST  Documented Medications  Documented  Aciphex 20 mg oral EC tablet (pt. own): 20 mg = 1 tab(s), Oral, Daily, 0 Refill(s)  BuSpar 7.5 mg oral tablet: = 1 tab(s), Oral, TID, # 90 tab(s), 0 Refill(s)  Bystolic 10 mg oral tablet: 10 mg = 1 tab(s), Oral, Daily, # 30 tab(s), 0 Refill(s)  Centrum Women's oral  tablet: 1 tab(s), Oral, Daily, 0 Refill(s)  Cequa 0.09% ophthalmic solution: 1 drop(s), OPTH, BID  Lubricant Eye Drops ophthalmic solution: 1 drop(s), Eye-Both, BID, PRN PRN as needed for dry eyes, # 30 EA, 0 Refill(s)  Ruma KwikPen 100 units/mL injectable solution: See Instructions, sliding scale, 0 Refill(s)  Symbicort 160 mcg-4.5 mcg/inh inhalation aerosol: 2 puff(s), INH, BID, PRN PRN wheezing and sob, 0 Refill(s)  Trintellix 20 mg oral tablet: 20 mg = 1 tab(s), Oral, Daily  Vitamin B12 Injection -CCA: qMonth, 0 Refill(s)  alPRAzolam 0.5 mg oral tablet: 0.5 mg = 1 tab(s), Oral, At Bedtime, PRN for anxiety, 0 Refill(s)  albuterol-ipratropium 2.5 mg-0.5 mg/3 mL inhalation solution: 3 mL, NEB, q6hr, PRN PRN sob or wheezing  amlodipine-benazepril 10 mg-20 mg oral capsule: 1 cap(s), Oral, Daily, 0 Refill(s)  meloxicam 15 mg oral tablet: 15 mg = 1 tab(s), Oral, Daily, 0 Refill(s)  montelukast 10 mg oral TABLET: 10 mg = 1 tab(s), Oral, Daily, 0 Refill(s)  potassium chloride 20 mEq oral TABLET extended release: 20 mEq = 1 tab(s), Oral, Daily   Problem list:    All Problems  Acid reflux / 7037039023 / Confirmed  Anemia / 104283386 / Confirmed  Asthma / 156987022 / Confirmed  Vitamin B12 deficiency / 963936771 / Confirmed  Low vitamin D level / 685512413 / Confirmed  Diabetes type 2 on insulin / 9905246733 / Confirmed  History of gastric bypass / 4353528617 / Confirmed  Hypertension / 21706187 / Confirmed  Depression with anxiety / 058834202 / Confirmed  NIKI on CPAP / 737895560 / Confirmed  Stage IV squamous cell carcinoma of lung / 383829921 / Confirmed  Chemotherapy management, encounter for / 738208370 / Confirmed  Type 1 diabetes mellitus / 266340001 / Confirmed  Diabetes mellitus type 2 in nonobese / 6121536792 / Confirmed  Canceled: Vitamin B12 deficiency / 339969216  Canceled: Abnormal chest CT / 9136205714  Canceled: Family history of lung cancer / 2097015709  Canceled: Heart disease / 64608154  Canceled:  Routine health maintenance / 519595892  Canceled: Sleep apnea / 543293805,    Active Problems (14)  Acid reflux   Anemia   Asthma   Chemotherapy management, encounter for   Depression with anxiety   Diabetes mellitus type 2 in nonobese   Diabetes type 2 on insulin   History of gastric bypass   Hypertension   Low vitamin D level   NIKI on CPAP   Stage IV squamous cell carcinoma of lung   Type 1 diabetes mellitus   Vitamin B12 deficiency         Histories   Past Medical History:    Active  Diabetes type 2 on insulin (3395409750)   Family History:    No family history items have been selected or recorded.   Procedure history:    Mammogram ([id number]) on 12/1/2020 at 65 Years.  Comments:  12/16/2020 10:05 ARNALDO - Omaira Patel  [facility]  Port Insertion on 11/2/2020 at 65 Years.  Mammogram ([id number]) on 1/1/2019 at 63 Years.  Bronchoscopy w/ Needle Aspir Biopsy(s) on 10/19/2017 at 62 Years.  Comments:  10/19/2017 13:24 Chasity Mistry RRT.  auto-populated from documented surgical case  Bronchoscopy w/ Endobronch Biopsy(s) on 10/19/2017 at 62 Years.  Comments:  10/19/2017 13:24 Chasity Mistry RRT.  auto-populated from documented surgical case  Bronchoscopy with Brushings on 10/19/2017 at 62 Years.  Comments:  10/19/2017 13:24 Chasity Mistry RRT.  auto-populated from documented surgical case  Bronchoscopy, Ebus, 2 or Less Samples on 10/19/2017 at 62 Years.  Comments:  10/19/2017 13:24 Chasity Mistry RRT.  auto-populated from documented surgical case  gastric sleeve and bypass.  cheryl fundiplication.  bilatteral bunnionectomy.  bilatteral carpel tunnel repair.  lasix eye surgery.  partial hysterectomy.  cholecysectomy.  tonsillectomy.  hemmorhedectomy.  dexcom 6 glucose monitoring.   Social History        Social & Psychosocial Habits    Alcohol  02/06/2015 Risk Assessment: Denies Alcohol Use    10/23/2020  Use: Current    Type: Liquor    Frequency: 1-2 times per  week    Substance Use  02/06/2015 Risk Assessment: Denies Substance Abuse    12/16/2020  Use: Never    Tobacco  02/06/2015 Risk Assessment: Denies Tobacco Use    01/13/2021  Use: Former smoker, quit more    Patient Wants Consult For Cessation Counseling N/A    Comment: quit 30+ years ago. - 01/13/2021 13:10 - Brad GIORDANO, Shannan CARRIZALES    Abuse/Neglect  01/13/2021  SHX Any signs of abuse or neglect No    Spiritual/Cultural  07/15/2020  Holiness Preference [employer]odoxy      07/15/2020  Branch of  Never in   .        Physical Examination   Vital Signs   1/27/2021 10:30 CST      Temperature Temporal Artery               36.3 DegC                             Peripheral Pulse Rate     86 bpm                             SpO2                      96 %                             Oxygen Therapy            Room air                             Systolic Blood Pressure   119 mmHg                             Diastolic Blood Pressure  83 mmHg                             Blood Pressure Location   Right arm    1/27/2021 10:17 CST      Temperature Oral          36.8 DegC                             Temperature Oral (calculated)             98.24 DegF                             Peripheral Pulse Rate     89 bpm                             Systolic Blood Pressure   117 mmHg                             Diastolic Blood Pressure  78 mmHg                             Mean Arterial Pressure, Cuff              91 mmHg    1/27/2021 10:17 CST      Oxygen Therapy            Room air     General:  Alert and oriented, No acute distress.    Eye:  Pupils are equal, round and reactive to light, Extraocular movements are intact, Normal conjunctiva.    HENT:  Normocephalic, Tympanic membranes are clear, Normal hearing, Oral mucosa is moist, No pharyngeal erythema.    Neck:  Supple, Non-tender, No jugular venous distention, No lymphadenopathy.    Respiratory:  Lungs are clear to auscultation, Respirations are non-labored, Symmetrical  chest wall expansion.    Cardiovascular:  Normal rate, Regular rhythm, No gallop, No edema.    Gastrointestinal:  Soft, Non-tender, Non-distended, Normal bowel sounds.    Lymphatics:  No lymphadenopathy neck, axilla, groin.    Musculoskeletal:  Normal range of motion.    Integumentary:  Warm, Intact, No pallor.    Neurologic:  Alert, Oriented, No focal deficits, Cranial Nerves II-XII are grossly intact, Normal deep tendon reflexes.    Cognition and Speech:  Oriented, Speech clear and coherent, Functional cognition intact.    Psychiatric:  Cooperative, Appropriate mood & affect, Normal judgment.    ECOG Performance Scale: 0 - Fully active; no performance restrictions.      Review / Management   Laboratory Results   Today's Lab Results : PowerNote Discrete Results   1/27/2021 10:34 CST      Est Creat Clearance Ser   51.72 mL/min    1/27/2021 10:32 CST      POC Hb                    11.2 mg/dL  LOW                             POC Hct                   33.0 %  LOW                             POC Sodium                139 mmol/L                             POC Potassium             3.9 mmol/L                             POC Chloride              102 mmol/L                             POC Ion Calcium           1.10 mmol/L  LOW                             POC Glucose               236 mg/dL  HI                             POC BUN                   13.0 mg/dL                             POC Creatinine            0.9 mg/dL                             POC AGAP                  14.0  NA    1/27/2021 10:11 CST      WBC                       4.1 x10(3)/mcL  LOW                             RBC                       2.78 x10(6)/mcL  LOW                             Hgb                       9.8 gm/dL  LOW                             Hct                       31.1 %  LOW                             Platelet                  336 x10(3)/mcL                             MCV                       111.9 fL  HI                              MCH                       35.3 pg  HI                             MCHC                      31.5 gm/dL  LOW                             RDW                       16.3 %                             MPV                       8.8 fL  LOW                             Abs Neut                  2.88 x10(3)/mcL                             NEUT%                     69.9 %  NA                             NEUT#                     2.9 x10(3)/mcL                             LYMPH%                    14.1 %  NA                             LYMPH#                    0.6 x10(3)/mcL                             MONO%                     11.4 %  NA                             MONO#                     0.5 x10(3)/mcL                             EOS%                      3.4 %  NA                             EOS#                      0.1 x10(3)/mcL                             BASO%                     0.7 %  NA                             BASO#                     0.0 x10(3)/mcL           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  DKA new onset diabetes secondary to immunotherapy  osteopenia  Chemotherapy encounter  Pancytopenia due to chemotherapy      Repeat imaging at [facility] on 7/28/20 showed a new superior left lower lobe 1 cm spiculated nodule, progressive subcarinal lymphadenopathy 3.2 x 1.4 cm  9/8/20: Started single agent Gemzar, Day 1 Day 8 q 3 weeks, tolerating well  Follow up with [facility] on 12/9/20 for restaging--stable disease      PLAN:  Continue gemcitabine, Cycle 7 today  RTC in 3 weeks with repeat labs and TD visit for cycle 7  Has repeat follow-up with MD Mcnamara in 3/2021 after cycle 8     Other Physicians   [facility] team

## 2022-04-30 NOTE — PROGRESS NOTES
Oncology Office Visit      Patient:   Lucy Love            MRN: [id number]            FIN: [id number]               Age:   66 years     Sex:  Female     :  1955   Associated Diagnoses:   None   Author:   Corin Hemphill NP      Chief Complaint   2021 10:32 CDT      c/o swelling in legs. Pt states [facility] is requesting her to have blood work today. Wanting to have a port flush done today.        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 CXR reveals right lung mass.   10/6/2017 CT the chest with contrast 6.9 by 3.6 by 3.1 cm right middle lobe mass invading the right middle lobe bronchus, 2mm nodule left apex   10/19/2017 Bronchoscopy, EBUS with 4R biopsy demonstrating squamous cell carcinoma. 4R lymph node negative.   10/26/2017 PET CT right middle lobe lung mass measuring 6.8cm with SUV 18.8 extending into the right hilum. Right 11th rib with a displaced fracture SUV 4.1. Soft tissue throughout left   maxillary sinus with extension into left nasal cavity   MRI of the Brain is negative for intracranial metastases but showed a left maxillary sinus mass extending in to the left nasal cavity.    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 This needs to be removed since there is a small chance this could be harboring an invasive   malignancy after the definitive treatments for her lung cancer.    An EBUS done outside was inconclusive.    [facility] done  [facility] was Negative for N2 disease.    The final stage is T4N0M0 (Stage III) Squamous cell Carcinoma of the lung. Plan is to do induction chemotherapy followed by surgery. The patient did not qualify for the cisplatin, docetaxel   and Nintedanib secondary to the central nature of the tumor and  the presence of vascular invasion.   5/2017 - 2/6/2018 Chemotherapy/3 cycles of carboplatin and Abraxane  5/23/2018: Completed definitive radiation therapy to the Chest 05/23/18.   10/25/19. Newly noted bilateral pulmonary nodules Biopsy confirms squamous cell carcinoma.   11/2019. The patient has NFE2L2 mutation and thus enrolled on the glutaminase inhibitor trial run by Dr. Bertrand Smith.    Right lung has progressed after 2 cycles of treatment with [id number] (Glutimase inhibitor).   PD-L1 80%. Initiated on single agent Pembrolizumab on 02/07/2020 --3/2020  - Restaging s/p c#2 showed good treatment response, how ever developed DKA requiring ICU admission and continuation of insulin.    Abraxane X 2 cycles (4/22/20)--- 7/15/2020:   6/2/20: Repeat CTs showed good response to treatment  7/31/20: CT C/A/P/ brain:CT the brain no evidence of abnormality, CT of the chest abdomen and pelvis, compared to outside CT of the chest in June, and abdomen pelvis in March,   New superior left lower lobe 1 cm spiculated nodule, progressive subcarinal lymphadenopathy 3.2 x 1.4 cm compared to 2.3 x 1.2 cm, unchanged right hepatic cyst, hypodensity lesion in the   liver. Changed adrenal nodule,  4/22/20-7/15/20: Abraxane q 3 weeks  9/11/20: MRI of the brain- no evidence of cranial metastases  9/4/20:PET/CT- 2 new pulmonary nodes seen on the 7/31/2020 chest CT mildly smaller today do not have increased FDG activity, although the small size does limit PET sensitivity, probe to borderline enlarged anterior paratracheal and gastropathic lymph nodes are mild hypermetabolism.  Nonspecific.    9/8/20: Gemzar 1000 mg/m² day 1, day 8 q 3 weeks--started    10/14/2020: CT-:Right middle lobe changes expected postsurgical scarring and radiation changes stable, pulmonary parenchymal metastatic disease likely improved compared to 9/ 4/2020 within limitations of comparison but clearly improved from 7/29/2020 minimal ground-glass opacities represent sequelae  of resolving metastases infection or inflammatory process, other stable findings   Gemzar 1000 mg/m² day 1, day 8 q 3 weeks- September 8, 2020--- February 24, 2021--stopped due to progression see below  3/11/2021: MRI of the brain, no evidence of intracranial metastases    3/10/2021: PET CT scan, FDG avid right paratracheal and bilateral hilar adenopathy represent neoplastic involvement or reactive process.  There is a left gastric and left retroperitoneal adenopathy suspicious for focal metastases.  Stable residual lung metastases are not avid, and the C4 metastases is not avid.  She has progressive disease in the lower abdomen.  She been recommended for phase 1 trial if clinical trial is not an option the consideration of docetaxel ramucirumab was made to the patient.  Liquid biopsy for molecular profiling was done an MRI of the brain.  We will discontinue Gemzar and follow is the MD Mcnamara note    4/5/2021: Docetaxel /ramucirumab  5/12/21: CT CAP after 2 cycles of Docetaxel/Ramucirumab   6/24 2021: CT CAP [facility], there are no CT findings of local recurrent malignancy within the radiated right lung, the intrathoracic and upper abdominal lymph nodes have decreased in size.  However there is a retroperitoneal lymph node that is slightly increased in size raising the possibility of a taylor metastases.  There are bilateral pulmonary nodules consistent with metastases that have increased in size slowly.  Soft tissue within the mesentery is unchanged.  This may be inflammatory versus peritoneal disease.  There are no systemic extrathoracic metastases.  (Pulmonary nodules 9 mm versus 6 mm, 9 mm versus 6 mm, 6 mm versus 5 mm, 5 mm versus 4 mm, glass opacity 2 cm unchanged    8/4/2021: CT CAP, increasing size of bilateral lung metastases, increasing size lymph nodes in the left gastric and left retroperitoneal space suspicious for taylor metastases, new small bilateral pleural effusions and ascites      Interval  History   Mrs Ayala presents with her son for scheduled follow up. She will be starting an early phase clinical trial at [facility]. She is doing well and maintains a good performance status.  She does have mild decreased mobility due to lower limb edema bilaterally. She saw cardiology and put her on diuretic. She also had a recent fall 2 weeks ago (mechanical fall) which resulted in a tear on her forearm which has required dressing, and has a residual large right - sided bruise and associated right hip pain (no difficulty weight-bearing,  Xray imaging performed at [facility]). She denies SOB, she has a chronic cough.              Review of Systems   Constitutional:  Negative.    Eye:       Discharge: Bilateral, Watery discharge.    Ear/Nose/Mouth/Throat:  Negative.    Respiratory:  Shortness of breath, No hemoptysis.    Cardiovascular:  Palpitations, Peripheral edema.    Breast:  Negative.    Gastrointestinal:  Negative.    Genitourinary:  Negative.    Hematology/Lymphatics:  Negative.    Endocrine:  Cold intolerance, Heat intolerance.    Immunologic:  Chemotherapy.    Musculoskeletal:  Negative.    Integumentary:  Pruritus.    Neurologic:  Alert and oriented X4, No abnormal balance, No confusion, No numbness, No tingling, No headache.       Health Status   Allergies:    Allergic Reactions (Selected)  Severity Not Documented  Iodine- Rash, swelling and throat swells.  Latex- Rash.  Norco- Dillusions, insomnia and itching.  Phenergan- Insomnia.  Shellfish- Rash, throat closes and face swells.  Tape- Red rash and gang green.,    Allergies (6) Active Reaction  iodine throat swells  Latex Rash  Norco insomnia  Phenergan insomnia  shellfish rash  Tape red rash     Current medications:  (Selected)   Outpatient Medications  Ordered  DuoNeb 0.5 mg-2.5 mg/3 mL inhalation solution: 3 mL, form: Soln, NEB, Once, first dose 10/19/17 8:38:00 CDT, stop date 10/19/17 8:38:00 CDT  Heparin Flush 100 U/mL - 5 mL: 500 units, form: Injection, IV  Push, Once-chemo, first dose 07/15/20 10:47:00 CDT, stop date 07/15/20 10:47:00 CDT, Routine, Days 1  Versed: 1 mg, form: Injection, IV Push, Once, first dose 10/19/17 8:00:00 CDT, stop date 10/19/17 8:00:00 CDT, 1 to 2 mg initial then 1 mg every 2 min until sedation level 2 achieved, 24  Prescriptions  Prescribed  Lantus Solostar Pen 100 units/mL subcutaneous solution: 12 units, Subcutaneous, Daily, # 15 mL, 3 Refill(s), Pharmacy: [facility], 161, cm, Height/Length Dosing, 05/20/20 14:05:00 CDT, 70.3, kg, Weight Dosing, 05/20/20 14:05:00 CDT  Lasix 20 mg oral tablet: See Instructions, bid x 3 day, # 6 tab(s), 0 Refill(s), Pharmacy: [facility], 161, cm, Height/Length Dosing, 08/16/21 10:43:00 CDT, 67.5, kg, Weight Dosing, 08/16/21 10:43:00 CDT  Vitamin D3 1000 intl units oral tablet: 1,000 IntUnit = 1 tab(s), Oral, Daily, # 60 tab(s), 0 Refill(s), other reason (Rx)  Zofran 8 mg oral tablet: See Instructions, 1 tab(s) Oral TID prn N+V, # 30 tab(s), 1 Refill(s), Pharmacy: [facility], 161, cm, Height/Length Dosing, 06/08/21 12:09:00 CDT, 60.1, kg, Weight Dosing, 06/08/21 12:09:00 CDT  Zofran 8 mg oral tablet: See Instructions, 1 tab(s) Oral TID prn N+V, # 30 tab(s), 1 Refill(s), Pharmacy: [facility], 161, cm, Height/Length Dosing, 07/20/21 15:06:00 CDT, 62.4, kg, Weight Dosing, 07/20/21 15:09:00 CDT  dronabinol 2.5 mg oral capsule: 2.5 mg = 1 cap(s), Oral, Once a day (at bedtime), # 30 cap(s), 2 Refill(s), Pharmacy: [facility], 161, cm, Height/Length Dosing, 11/19/20 15:21:00 CST, 59, kg, Weight Dosing, 11/19/20 15:21:00 CST  metFORMIN 1000 mg oral tablet: 1,000 mg = 1 tab(s), Oral, BID, # 180 tab(s), 1 Refill(s), Pharmacy: [facility], 161, cm, Height/Length Dosing, 01/27/21 10:17:00 CST, 59.4, kg, Weight Dosing, 01/27/21 10:17:00 CST  mirtazapine 30 mg oral tablet: See Instructions, TAKE ONE TABLET BY MOUTH AT BEDTIME, # 90 tab(s), 3 Refill(s),  Pharmacy: [facility], 161, cm, Height/Length Dosing, 02/24/21 12:11:00 CST, 59.8, kg, Weight Dosing, 02/24/21 12:11:00 CST  montelukast 10 mg oral TABLET: 10 mg = 1 tab(s), Oral, Daily, # 30 tab(s), 3 Refill(s), Pharmacy: [facility], 161, cm, Height/Length Dosing, 05/17/21 12:25:00 CDT, 62.7, kg, Weight Dosing, 05/17/21 12:25:00 CDT  ropinirole 0.5 mg oral tablet: See Instructions, TAKE ONE TABLET BY MOUTH 1 to 3 hours before bedtime, # 30 tab(s), 11 Refill(s), Pharmacy: [facility], 161, cm, Height/Length Dosing, 06/28/21 9:53:00 CDT, 59.2, kg, Weight Dosing, 06/28/21 9:53:00 CDT  traMADol 50 mg oral tablet: See Instructions, 1 tab(s) Oral q6hr prn pain, # 30 tab(s), 0 Refill(s), Pharmacy: [facility], 161, cm, Height/Length Dosing, 06/28/21 9:53:00 CDT, 59.2, kg, Weight Dosing, 06/28/21 9:53:00 CDT  Documented Medications  Documented  Baqsimi Two Pack 3 mg nasal powder: 3 mg, Nasal, As Directed  BuSpar 7.5 mg oral tablet: = 1 tab(s), Oral, TID, # 90 tab(s), 0 Refill(s)  Bystolic 10 mg oral tablet: 10 mg = 1 tab(s), Oral, Daily, # 30 tab(s), 0 Refill(s)  Centrum Women's oral tablet: 1 tab(s), Oral, Daily, 0 Refill(s)  Cequa 0.09% ophthalmic solution: 1 drop(s), OPTH, BID  HumaLOG Cartridge 100 units/mL injectable solution: units, 0-60 units, Subcutaneous, Daily  Lubricant Eye Drops ophthalmic solution: 1 drop(s), Eye-Both, BID, PRN PRN as needed for dry eyes, # 30 EA, 0 Refill(s)  Robitussin-DM: PRN cough, 0 Refill(s)  Symbicort 160 mcg-4.5 mcg/inh inhalation aerosol: 2 puff(s), INH, BID, PRN PRN wheezing and sob, 0 Refill(s)  Template Non-Formulary Med: Biotin Multi-vitamin, 0 Refill(s)  Vitamin B12 Injection -CCA: qMonth, 0 Refill(s)  alPRAzolam 0.5 mg oral tablet: 0.5 mg = 1 tab(s), Oral, At Bedtime, PRN for anxiety, 0 Refill(s)  albuterol-ipratropium 2.5 mg-0.5 mg/3 mL inhalation solution: 3 mL, NEB, q6hr, PRN PRN sob or wheezing  benazepril 20 mg oral tablet: 20 mg  = 1 tab(s), Oral, Daily, # 60 tab(s), 0 Refill(s)  cyclobenzaprine 10 mg oral tablet: 10 mg = 1 tab(s), Oral, TID, PRN PRN for spasm, # 30 tab(s), 0 Refill(s)  dexamethasone 4 mg oral tablet: See Instructions, 2 tab(s) Oral with supper night before chemotherapy and tab 1 PO with breakfast and tab 1 with lunch for 3 days after chemotherapy., # 48, 0 Refill(s)  loratadine 10 mg oral capsule: 10 mg = 1 cap(s), Oral, Daily, 0 Refill(s)  meloxicam 15 mg oral tablet: 15 mg = 1 tab(s), Oral, Daily, 0 Refill(s)   Problem list:    All Problems  Acid reflux / 0628197892 / Confirmed  Anemia / 155295142 / Confirmed  Asthma / 802153008 / Confirmed  Vitamin B12 deficiency / 535060945 / Confirmed  Low vitamin D level / 627771933 / Confirmed  Diabetes type 2 on insulin / 2519937481 / Confirmed  History of gastric bypass / 6145092265 / Confirmed  Hypertension / 42758034 / Confirmed  Depression with anxiety / 675061593 / Confirmed  Neutropenia due to and following chemotherapy / 3627693196 / Confirmed  NIKI on CPAP / 275996718 / Confirmed  Osteopenia / 686380217 / Confirmed  Stage IV squamous cell carcinoma of lung / 822965667 / Confirmed  Chemotherapy management, encounter for / 572244443 / Confirmed  Type 1 diabetes mellitus / 727206015 / Confirmed  Diabetes mellitus type 2 in nonobese / 8650810367 / Confirmed  Canceled: Vitamin B12 deficiency / 763416667  Canceled: Abnormal chest CT / 6714574052  Canceled: Family history of lung cancer / 5814051735  Canceled: Heart disease / 68699655  Canceled: Routine health maintenance / 932508865  Canceled: Sleep apnea / 705113047,    Active Problems (16)  Acid reflux   Anemia   Asthma   Chemotherapy management, encounter for   Depression with anxiety   Diabetes mellitus type 2 in nonobese   Diabetes type 2 on insulin   History of gastric bypass   Hypertension   Low vitamin D level   Neutropenia due to and following chemotherapy   NIKI on CPAP   Osteopenia   Stage IV squamous cell carcinoma of  lung   Type 1 diabetes mellitus   Vitamin B12 deficiency         Histories   Past Medical History:    Active  Diabetes type 2 on insulin ([id number])   Family History:    No family history items have been selected or recorded.   Procedure history:    Mammogram ([id number]) on 12/1/2020 at 65 Years.  Comments:  12/16/2020 10:05 Omaira Colon  [facility]  Port Insertion on 11/2/2020 at 65 Years.  Mammogram ([id number]) on 1/1/2019 at 63 Years.  Bronchoscopy w/ Needle Aspir Biopsy(s) on 10/19/2017 at 62 Years.  Comments:  10/19/2017 13:24 Chasity Mistry RRT.  auto-populated from documented surgical case  Bronchoscopy w/ Endobronch Biopsy(s) on 10/19/2017 at 62 Years.  Comments:  10/19/2017 13:24 Chasity Mistry RRT.  auto-populated from documented surgical case  Bronchoscopy with Brushings on 10/19/2017 at 62 Years.  Comments:  10/19/2017 13:24 Chasity Mistry RRT C.  auto-populated from documented surgical case  Bronchoscopy, Ebus, 2 or Less Samples on 10/19/2017 at 62 Years.  Comments:  10/19/2017 13:24 Chasity Mistry RRT.  auto-populated from documented surgical case  gastric sleeve and bypass.  cheryl fundiplication.  bilatteral bunnionectomy.  bilatteral carpel tunnel repair.  lasix eye surgery.  partial hysterectomy.  cholecysectomy.  tonsillectomy.  hemmorhedectomy.  dexcom 6 glucose monitoring.  Dexcom G6 glucose monitor.   Social History        Social & Psychosocial Habits    Alcohol  02/06/2015 Risk Assessment: Denies Alcohol Use    08/16/2021  Use: Current    Type: Liquor    Frequency: 1-2 times per week    Home/Environment  07/19/2021  Lives with: Spouse    Living situation: Home/Independent    Home equipment: CPAP/BiPAP, Glucose monitoring    Substance Use  02/06/2015 Risk Assessment: Denies Substance Abuse    05/17/2021  Use: Never    Tobacco  02/06/2015 Risk Assessment: Denies Tobacco Use    08/16/2021  Use: Former smoker, quit more    Patient Wants  Consult For Cessation Counseling N/A    Comment: quit 30+ years ago. - 01/13/2021 13:10 - Brad GIORDANO, Shannan CARRIZALES    Abuse/Neglect  08/16/2021  SHX Any signs of abuse or neglect No    Spiritual/Cultural  07/15/2020  [employer]      07/15/2020  Branch of  Never in   .        Physical Examination   Vital Signs   9/20/2021 10:32 CDT      Temperature Oral          36.5 DegC                             Temperature Oral (calculated)             97.70 DegF                             Peripheral Pulse Rate     78 bpm                             Oxygen Therapy            Room air                             Systolic Blood Pressure   136 mmHg                             Diastolic Blood Pressure  101 mmHg  HI                             Blood Pressure Location   Left arm     Measurements from flowsheet : Measurements   9/20/2021 10:32 CDT      Weight Dosing             69.800 kg                             Weight Measured           69.8 kg                             Weight Measured and Calculated in Lbs     153.88 lb                             Height/Length Dosing      161.00 cm                             Height/Length Measured    161 cm                             BSA Measured              1.77 m2                             Body Mass Index Measured  26.93 kg/m2     General:  Alert and oriented, No acute distress.    Eye:  Pupils are equal, round and reactive to light, Extraocular movements are intact, Normal conjunctiva.    HENT:  Normocephalic, Tympanic membranes are clear, Normal hearing, Oral mucosa is moist, No pharyngeal erythema.    Neck:  Supple, Non-tender, No jugular venous distention, No lymphadenopathy.    Respiratory:  Lungs are clear to auscultation, Respirations are non-labored, Symmetrical chest wall expansion.    Cardiovascular:  Normal rate, Regular rhythm, No gallop.         Edema: 2+.    Gastrointestinal:  Soft, Non-tender, Non-distended, Normal bowel sounds.     Lymphatics:  No lymphadenopathy neck, axilla, groin.    Musculoskeletal:  Normal range of motion.    Integumentary:  Warm, Intact, No pallor, Alopecia, Darkening of the nailbeds, skin tear to left forearm.    Neurologic:  Alert, Oriented, No focal deficits, Cranial Nerves II-XII are grossly intact, Normal deep tendon reflexes.    Cognition and Speech:  Oriented, Speech clear and coherent, Functional cognition intact.    Psychiatric:  Cooperative, Appropriate mood & affect, Normal judgment.    ECOG Performance Scale: 1- Strenuous physical activity restricted; fully ambulatory and able to carry out light work.      Review / Management   Results review   Laboratory Results      Impression and Plan   IMPRESSION:  1. 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 DKA new onset diabetes secondary to immunotherapy  3. Osteopenia  4. Stable neuropathy  5. Canalicular stenosis and eye watering, resolved      PLAN:  Interval CT CAP on 8/4/21 at [facility], showed progression  Returns to [facility] FOR STUDY   Mediport flush today with CBC, CMP fax to [facility]

## 2022-04-30 NOTE — PROGRESS NOTES
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 2017 Tripped over cat injuring right posterior chest. CXR reveals right lung mass.   2. 10/6/2017 CT the chest with contrast 6.9 by 3.6 by 3.1 cm right middle lobe mass invading the right middle lobe bronchus, 2mm nodule left apex   3. 10/19/2017 Bronchoscopy, EBUS with 4R biopsy demonstrating squamous cell carcinoma. 4R lymph node negative.   4.10/26/2017 PET CT right middle lobe lung mass measuring 6.8cm with SUV 18.8 extending into the right hilum. Right 11th rib with a displaced fracture SUV 4.1. Soft tissue throughout left maxillary sinus   with extension into left nasal cavity  5. MRI of the Brain is negative for intracranial metastases but showed a left maxillary sinus mass extending in to the left nasal cavity.   6. 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 This needs to be removed since there is a small chance this could be harboring an invasive malignancy   after the definitive treatments for her lung cancer.   7. An EBUS done outside was inconclusive.   8. EBUS done  [facility] was Negative for N2 disease.    The final stage is T4N0M0 (Stage III) Squamous cell Carcinoma of the lung. Plan is to do induction chemotherapy followed by surgery. The patient did not qualify for the cisplatin, docetaxel and   Nintedanib secondary to the central nature of the tumor and the presence of vascular invasion.   9. 2017 - 2018 Chemotherapy/3 cycles of carboplatin and Abraxane  10. Completed definitive radiation therapy to  the Chest 05/23/18.   11. 10/25/19. Newly noted bilateral pulmonary nodules Biopsy confirms squamous cell carcinoma.   12. 11/2019. The patient has NFE2L2 mutation and thus enrolled on the glutaminase inhibitor trial run by Dr. Bertrand Smith.   13. Right lung has progressed after 2 cycles of treatment with [id number] (Glutimase inhibitor).   14. PD-L1 80%. Initiated on single agent Pembrolizumab on 02/07/2020 --3/2020  - Restaging s/p c#2 showed good treatment response, how ever developed DKA requiring ICU admission and continuation of insulin.    Abraxane X 2 cycles (4/22/20)  6/2/20: Repeat CTs showed good response to treatment         Chief Complaint   6/24/2020 11:02 CDT      About 2 days after tx she has leg and hip pain. No pain relief with medications ordered.      Interval History   Patient was seen on 6/2/2020 at [facility] via telemedicine visit.  Repeat imaging after 2 cycles of single agent Abraxane showed good treatment response. They recommend continue with Abraxane. With her wild swings in blood sugar will discontinue using steroids.  She is tolerating ok, other than several days of fatigue. She has no N/V/D.   She is doing well besides for some pretty significant muscle pains in her legs and hips for 3 days following chemotherapy. She has tried at home remedies as well as tylenol and tramadol that she had at home with no relief. Her counts are adequate. No fever or neuropathy.       Review of Systems   Constitutional:  Fatigue, No fever, No chills.    Eye:  Blurring, No double vision.    Ear/Nose/Mouth/Throat:  No nasal congestion, No sore throat.    Respiratory:  No shortness of breath, No cough.    Cardiovascular:  No chest pain, No palpitations.    Gastrointestinal:  No nausea, No vomiting, No diarrhea, No constipation.    Immunologic:  Chemotherapy.    Musculoskeletal:  Joint pain.    Integumentary:  No rash, No pruritus.    Neurologic:  Alert and oriented X4, No headache.       Health Status    Allergies:    Allergic Reactions (Selected)  Severity Not Documented  Iodine- Rash, swelling and throat swells.  Latex- Rash.  Norco- Dillusions, insomnia and itching.  Phenergan- Insomnia.  Shellfish- Rash, throat closes and face swells.  Tape- Red rash and gang green.,    Allergies (6) Active Reaction  iodine throat swells  Latex Rash  Norco insomnia  Phenergan insomnia  shellfish rash  Tape red rash     Current medications:  (Selected)   Outpatient Medications  Ordered  DuoNeb 0.5 mg-2.5 mg/3 mL inhalation solution: 3 mL, NEB, Once  Versed: 1 mg, 1 mL, IV Push, Once  Prescriptions  Prescribed  Lantus Solostar Pen 100 units/mL subcutaneous solution: 20 units, Subcutaneous, Daily, 15 mL, 3 Refill(s)  Levemir FlexPen 100 units/mL subcutaneous solution: 20 units, Subcutaneous, Once a day (at bedtime), for 90 day(s), 15 mL, 1 Refill(s)  Trulicity Pen 1.5 mg/0.5 mL subcutaneous solution: 1.5 mg, 0.5 mL, Subcutaneous, qWeek, 2.5 mL, 11 Refill(s)  Zofran 4 mg oral tablet: 4 mg, 1 tab(s), Oral, q6hr, PRN, PRN: as needed for nausea/vomiting, 30 tab(s), 1 Refill(s)  Zofran 8 mg oral tablet: See Instructions, 1 tab(s) Oral TID prn N+V, 30 tab(s), 1 Refill(s)  magnesium oxide 500 mg oral tablet: 500 mg, 1 tab(s), Oral, Daily, discontinue mag ox 400 mg daily, 30 tab(s), 2 Refill(s)  metFORMIN 1000 mg oral tablet: 1,000 mg, 1 tab(s), Oral, BID, 180 tab(s), 1 Refill(s)  ropinirole 0.5 mg oral tablet: See Instructions, TAKE ONE TABLET BY MOUTH 1 -3 hours before bedtime, 30 tab(s), 5 Refill(s)  traMADol 50 mg oral tablet: 50 mg, 1 tab(s), Oral, q6hr, PRN: pain, mild, 30 tab(s), 0 Refill(s)  Documented Medications  Documented  Acidophilus oral tablet: tab 1, Oral, Daily, 0 Refill(s)  Aleve 220 mg oral tablet: 1 cap(s), Oral, q8hr, PRN: as needed for pain, 40 cap(s), 0 Refill(s)  Ambien 10 mg oral tab -[facility]: 1 tab(s), Oral, Once a day (at bedtime), PRN: as needed for insomnia, 0 Refill(s)  Bystolic 10 mg oral tablet: 10 mg, 1  tab(s), Oral, Daily, 30 tab(s)  Centrum Women's oral tablet: 1 tab(s), Oral, Daily, 0 Refill(s)  Lubricant Eye Drops ophthalmic solution: 1 drop(s), Eye-Both, BID, PRN: as needed for dry eyes, 30 EA, 0 Refill(s)  Misc Prescription: Biotin 10,000 mcg daily, 0 Refill(s)  NovoLIN R FlexPen 100 units/mL injectable solution: Subcutaneous, As Directed  Symbicort 160 mcg-4.5 mcg/inh inhalation aerosol: 2 puff(s), INH, BID, PRN: wheezing and sob, 0 Refill(s)  Trintellix 20 mg oral tablet: 20 mg, 1 tab(s), Oral, qAM  Vitamin B12 Injection -CCA: qMonth  Vitamin D3 5000 intl units (125 mcg) oral capsule: 5,000 IntUnit, 1 cap(s), Oral, Daily, with food, 100 cap(s), 0 Refill(s)  alPRAzolam 0.5 mg oral tablet: 0.5 mg, 1 tab(s), Oral, At Bedtime, PRN: for anxiety  albuterol-ipratropium 2.5 mg-0.5 mg/3 mL inhalation solution: 3 mL, NEB, q6hr  amlodipine-benazepril 10 mg-20 mg oral capsule: 1 cap(s), Oral, Daily  busPIRone 7.5 mg oral tablet: 7.5 mg, 1 tab(s), Oral, BID, 0 Refill(s)  cyclobenzaprine 10 mg oral tablet: 10 mg, 1 tab(s), Oral, qPM  loratadine 10 mg oral capsule: 10 mg, 1 cap(s), Oral, Daily  montelukast 10 mg oral TABLET: 10 mg, 1 tab(s), Oral, Daily, 0 Refill(s)  potassium chloride 20 mEq oral TABLET extended release: 20 mEq, 1 tab(s), Oral, Daily, 30 tab(s)   Problem list:    All Problems  Acid reflux / 4647105221 / Confirmed  Asthma / 921496128 / Confirmed  Vitamin B12 deficiency / 263895844 / Confirmed  Low vitamin D level / 707811308 / Confirmed  History of gastric bypass / 2256867283 / Confirmed  Hypertension / 70154193 / Confirmed  Depression with anxiety / 791134857 / Confirmed  NIKI on CPAP / 650044763 / Confirmed  Stage IV squamous cell carcinoma of lung / 981696595 / Confirmed  Diabetes mellitus type 2 in nonobese / 7931979234 / Confirmed,    Active Problems (10)  Acid reflux   Asthma   Depression with anxiety   Diabetes mellitus type 2 in nonobese   History of gastric bypass   Hypertension   Low vitamin D  level   NIKI on CPAP   Stage IV squamous cell carcinoma of lung   Vitamin B12 deficiency         Histories   Past Medical History:    No active or resolved past medical history items have been selected or recorded.   Family History:    No family history items have been selected or recorded.   Procedure history:    Mammogram (421648022) on 1/1/2019 at 63 Years.  Bronchoscopy w/ Needle Aspir Biopsy(s) on 10/19/2017 at 62 Years.  Comments:  10/19/2017 13:24 Chasity Mistry RRT.  auto-populated from documented surgical case  Bronchoscopy w/ Endobronch Biopsy(s) on 10/19/2017 at 62 Years.  Comments:  10/19/2017 13:24 Chasity Mistry RRT.  auto-populated from documented surgical case  Bronchoscopy with Brushings on 10/19/2017 at 62 Years.  Comments:  10/19/2017 13:24 Chasity Mistry RRT.  auto-populated from documented surgical case  Bronchoscopy, Ebus, 2 or Less Samples on 10/19/2017 at 62 Years.  Comments:  10/19/2017 13:24 Chasity Mistry RRT.  auto-populated from documented surgical case  gastric sleeve and bypass.  cheryl fundiplication.  bilatteral bunnionectomy.  bilatteral carpel tunnel repair.  lasix eye surgery.  partial hysterectomy.  cholecysectomy.  tonsillectomy.  hemmorhedectomy.   Social History        Social & Psychosocial Habits    Alcohol  02/06/2015 Risk Assessment: Denies Alcohol Use    10/10/2017  Use: Current    Type: Liquor    Frequency: Daily    04/06/2020  Use: Past    Type: Liquor    Frequency: 1-2 times per week    05/17/2020  Use: Past    Substance Use  02/06/2015 Risk Assessment: Denies Substance Abuse    Tobacco  02/06/2015 Risk Assessment: Denies Tobacco Use    06/24/2020  Use: Former smoker, quit more    Patient Wants Consult For Cessation Counseling No    Abuse/Neglect  06/24/2020  SHX Any signs of abuse or neglect No  .        Physical Examination   Vital Signs   6/24/2020 11:02 CDT      Temperature Oral          36.8 DegC                             Temperature  Oral (calculated)             98.24 DegF                             Peripheral Pulse Rate     86 bpm                             Systolic Blood Pressure   125 mmHg                             Diastolic Blood Pressure  80 mmHg     General:  Alert and oriented, No acute distress.    Eye:  Pupils are equal, round and reactive to light, Normal conjunctiva.    HENT:  Normocephalic, Oral mucosa is moist, No sinus tenderness.    Neck:  Supple, Non-tender, No lymphadenopathy.    Respiratory:  Lungs are clear to auscultation, Respirations are non-labored.    Cardiovascular:  Normal rate, Regular rhythm, No edema.    Gastrointestinal:  Soft, Non-tender, Non-distended, Normal bowel sounds.    Lymphatics:  No lymphadenopathy neck, axilla, groin.    Musculoskeletal:       Upper extremity exam: Shoulder ( Right, Pain, Range of motion ( Diminished ) ).    Integumentary:  Warm, Dry.    Neurologic:  Alert, Oriented, Cranial Nerves II-XII are grossly intact.    Cognition and Speech:  Speech clear and coherent.    Psychiatric:  Appropriate mood & affect.    ECOG Performance Scale: 1- Strenuous physical activity restricted; fully ambulatory and able to carry out light work.      Review / Management   Results review:  Lab results   6/24/2020 10:55 CDT      POC Sodium                141 mmol/L                             POC Potassium             4.5 mmol/L                             POC Chloride              103 mmol/L                             POC Ion Calcium           1.21 mmol/L                             POC Glucose               187 mg/dL  HI                             POC BUN                   15.0 mg/dL                             POC Creatinine            0.8 mg/dL                             POC AGAP                  18.0  NA                             POC Hb                    11.9 mg/dL  LOW                             POC Hct                   35.0 %  LOW                             POC TCO2                  25.0  mmol/L    6/24/2020 10:37 CDT      WBC                       5.4 x10(3)/mcL                             RBC                       3.55 x10(6)/mcL  LOW                             Hgb                       10.7 gm/dL  LOW                             Hct                       34.1 %  LOW                             Platelet                  336 x10(3)/mcL                             MCV                       96.1 fL  HI                             MCH                       30.1 pg                             MCHC                      31.4 gm/dL  LOW                             RDW                       13.0 %                             MPV                       8.8 fL  LOW                             Abs Neut                  3.41 x10(3)/mcL                             NEUT%                     62.8 %  NA                             NEUT#                     3.4 x10(3)/mcL                             LYMPH%                    20.4 %  NA                             LYMPH#                    1.1 x10(3)/mcL                             MONO%                     9.9 %  NA                             MONO#                     0.5 x10(3)/mcL                             EOS%                      4.8 %  NA                             EOS#                      0.3 x10(3)/mcL                             BASO%                     1.7 %  NA                             BASO#                     0.1 x10(3)/mcL  .       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  Stable taylor metastasis.  Stable post therapeutic changes in the right lung.   She will continue single agent Abraxane per MD  Anders recs, cycle 4 today  She goes back to [facility] on 7/22 with repeat imaging  RTC in 3 weeks with repeat labs  Tylenol with codeine for 3 days following chemotherapy    Other Physicians

## 2022-04-30 NOTE — PROGRESS NOTES
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 CXR reveals right lung mass.   10/6/2017 CT the chest with contrast 6.9 by 3.6 by 3.1 cm right middle lobe mass invading the right middle lobe bronchus, 2mm nodule left apex   10/19/2017 Bronchoscopy, EBUS with 4R biopsy demonstrating squamous cell carcinoma. 4R lymph node negative.   10/26/2017 PET CT right middle lobe lung mass measuring 6.8cm with SUV 18.8 extending into the right hilum. Right 11th rib with a displaced fracture SUV 4.1. Soft tissue throughout left   maxillary sinus with extension into left nasal cavity   MRI of the Brain is negative for intracranial metastases but showed a left maxillary sinus mass extending in to the left nasal cavity.    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 This needs to be removed since there is a small chance this could be harboring an invasive   malignancy after the definitive treatments for her lung cancer.    An EBUS done outside was inconclusive.    EB[facility] done  [facility] was Negative for N2 disease.    The final stage is T4N0M0 (Stage III) Squamous cell Carcinoma of the lung. Plan is to do induction chemotherapy followed by surgery. The patient did not qualify for the cisplatin, docetaxel   and Nintedanib secondary to the central nature of the tumor and the presence of vascular invasion.   2017 - 2018 Chemotherapy/3 cycles of carboplatin and Abraxane  2018:  Completed definitive radiation therapy to the Chest 05/23/18.   10/25/19. Newly noted bilateral pulmonary nodules Biopsy confirms squamous cell carcinoma.   11/2019. The patient has NFE2L2 mutation and thus enrolled on the glutaminase inhibitor trial run by Dr. Bertrand Smith.    Right lung has progressed after 2 cycles of treatment with [id number] (Glutimase inhibitor).   PD-L1 80%. Initiated on single agent Pembrolizumab on 02/07/2020 --3/2020  - Restaging s/p c#2 showed good treatment response, how ever developed DKA requiring ICU admission and continuation of insulin.    Abraxane X 2 cycles (4/22/20)--- 7/15/2020:   6/2/20: Repeat CTs showed good response to treatment  7/31/20: CT C/A/P/ brain:CT the brain no evidence of abnormality, CT of the chest abdomen and pelvis, compared to outside CT of the chest in June, and abdomen pelvis in March,   New superior left lower lobe 1 cm spiculated nodule, progressive subcarinal lymphadenopathy 3.2 x 1.4 cm compared to 2.3 x 1.2 cm, unchanged right hepatic cyst, hypodensity lesion in the   liver. Changed adrenal nodule,  4/22/20-7/15/20: Abraxane q 3 weeks  9/11/20: MRI of the brain- no evidence of cranial metastases  9/4/20:PET/CT- 2 new pulmonary nodes seen on the 7/31/2020 chest CT mildly smaller today do not have increased FDG activity, although the small size does limit PET sensitivity, probe to   borderline enlarged anterior paratracheal and gastropathic lymph nodes are mild hypermetabolism.  Nonspecific.  9/8/20: Gemzar 1000 mg/m² day 1, day 8 q 3 weeks    10/14/2020: CT-:Right middle lobe changes expected postsurgical scarring and radiation changes stable, pulmonary parenchymal metastatic disease likely improved compared to 9/ 4/2020 within limitations of comparison but clearly improved from 7/29/2020 minimal ground-glass opacities represent sequelae of resolving metastases infection or inflammatory process, other stable findings      Interval History   Patient presents  today with her  for treatment visit. She is due for Cycle 6 of Gemzar. She is doing well and tolerating treatment. She was recently seen for endocrinologist for her uncontrolled diabetes. She has mild nausea which is controlled. She doesnt routinely vomit. Her labs are stable.          Review of Systems   Constitutional:  No chills, No sweats, No weakness, No fatigue, No decreased activity.    Eye:  No recent visual problem, No blurring, No double vision.    Ear/Nose/Mouth/Throat:  No nasal congestion, No sore throat.    Respiratory:  Cough, Sputum production, No shortness of breath, No hemoptysis, No wheezing.    Cardiovascular:  No chest pain, No palpitations, No bradycardia, No peripheral edema.    Gastrointestinal:  Heartburn, No vomiting, No diarrhea, No constipation.    Genitourinary:  No dysuria, No hematuria.    Hematology/Lymphatics:  Bruising tendency.    Immunologic:  Chemotherapy.    Musculoskeletal:  No joint pain, No muscle pain.    Integumentary:  No rash, No pruritus.    Neurologic:  Alert and oriented X4, Numbness, Tingling, No abnormal balance, No confusion, No headache.    Psychiatric:  Negative.       Health Status   Allergies:    Allergic Reactions (Selected)  Severity Not Documented  Iodine- Rash, swelling and throat swells.  Latex- Rash.  Norco- Dillusions, insomnia and itching.  Phenergan- Insomnia.  Shellfish- Rash, throat closes and face swells.  Tape- Red rash and gang green.,    Allergies (6) Active Reaction  iodine throat swells  Latex Rash  Norco insomnia  Phenergan insomnia  shellfish rash  Tape red rash     Current medications:  (Selected)   Outpatient Medications  Ordered  DuoNeb 0.5 mg-2.5 mg/3 mL inhalation solution: 3 mL, form: Soln, NEB, Once, first dose 10/19/17 8:38:00 CDT, stop date 10/19/17 8:38:00 CDT  Heparin Flush 100 U/mL - 5 mL: 500 units, form: Injection, IV Push, Once-chemo, first dose 07/15/20 10:47:00 CDT, stop date 07/15/20 10:47:00 CDT, Routine, Days  1  Versed: 1 mg, form: Injection, IV Push, Once, first dose 10/19/17 8:00:00 CDT, stop date 10/19/17 8:00:00 CDT, 1 to 2 mg initial then 1 mg every 2 min until sedation level 2 achieved, 24  Future  Aloxi (for IVPB): 0.25 mg, form: Injection, IV Piggyback, Once-chemo, Infuse over: 20 minute(s), *Est. first dose 01/06/21 10:28:00 CST, *Est. stop date 01/06/21 10:28:00 CST, Future Order, Days 1  Aloxi (for IVPB): 0.25 mg, form: Injection, IV Piggyback, Once-chemo, Infuse over: 20 minute(s), *Est. first dose 01/13/21 10:28:00 CST, *Est. stop date 01/13/21 10:28:00 CST, Future Order, Days 8  Gemzar (for IVPB): 1,634 mg, form: Soln, IV Piggyback, Once-chemo, Infuse over: 30 minute(s), *Est. first dose 01/06/21 10:48:00 CST, *Est. stop date 01/06/21 10:48:00 CST, Future Order, Days 1  Gemzar (for IVPB): 1,634 mg, form: Soln, IV Piggyback, Once-chemo, Infuse over: 30 minute(s), *Est. first dose 01/13/21 10:48:00 CST, *Est. stop date 01/13/21 10:48:00 CST, Future Order, Days 8  Heparin Flush 100 U/mL - 5 mL: 500 units, form: Injection, IV Push, Once-chemo, *Est. first dose 01/06/21 11:48:00 CST, *Est. stop date 01/06/21 11:48:00 CST, Routine, Days 1, Future Order  Heparin Flush 100 U/mL - 5 mL: 500 units, form: Injection, IV Push, Once-chemo, *Est. first dose 01/13/21 11:48:00 CST, *Est. stop date 01/13/21 11:48:00 CST, Routine, Days 8, Future Order  dexamethasone (for IVPB): 6 mg, form: Soln, IV Piggyback, Once-chemo, Infuse over: 20 minute(s), *Est. first dose 01/06/21 10:28:00 CST, *Est. stop date 01/06/21 10:28:00 CST, Future Order, Days 1  dexamethasone (for IVPB): 6 mg, form: Soln, IV Piggyback, Once-chemo, Infuse over: 20 minute(s), *Est. first dose 01/13/21 10:28:00 CST, *Est. stop date 01/13/21 10:28:00 CST, Future Order, Days 8  Prescriptions  Prescribed  Lantus Solostar Pen 100 units/mL subcutaneous solution: 12 units, Subcutaneous, Daily, # 15 mL, 3 Refill(s), Pharmacy: [facility], 161, cm,  Height/Length Dosing, 05/20/20 14:05:00 CDT, 70.3, kg, Weight Dosing, 05/20/20 14:05:00 CDT  Nb Magnesium 500mg Tab 200: Nb Magnesium 500mg Tab 200, See Instructions, take 1 tablet by mouth once a day. stop taking the 400mg tablets, # 30 EA, 5 Refill(s), Pharmacy: [facility], 161, cm, Height/Length Dosing, 07/15/20 10:49:00 CDT, 66.8, kg, Weight Dosing, 07/1...  Trulicity Pen 1.5 mg/0.5 mL subcutaneous solution: 1.5 mg = 0.5 mL, Subcutaneous, qWeek, # 2.5 mL, 11 Refill(s), Pharmacy: [facility], 158, cm, Height/Length Dosing, 04/22/20 15:18:00 CDT, 75.3, kg, Weight Dosing, 04/22/20 15:18:00 CDT  Vitamin D3 1000 intl units oral tablet: 1,000 IntUnit = 1 tab(s), Oral, Daily, # 60 tab(s), 0 Refill(s), other reason (Rx)  Zofran 8 mg oral tablet: See Instructions, 1 tab(s) Oral TID prn N+V, # 30 tab(s), 0 Refill(s), Pharmacy: [facility], 161, cm, Height/Length Dosing, 08/12/20 14:50:00 CDT, 65, kg, Weight Dosing, 08/12/20 14:50:00 CDT  dronabinol 2.5 mg oral capsule: 2.5 mg = 1 cap(s), Oral, Once a day (at bedtime), # 30 cap(s), 2 Refill(s), Pharmacy: [facility], 161, cm, Height/Length Dosing, 11/19/20 15:21:00 CST, 59, kg, Weight Dosing, 11/19/20 15:21:00 CST  ferrous gluconate 324 mg oral tablet: 324 mg = 1 tab(s), Oral, Daily, # 100 tab(s), 1 Refill(s), Pharmacy: [facility], 161, cm, Height/Length Dosing, 11/19/20 15:21:00 CST, 59, kg, Weight Dosing, 11/19/20 15:21:00 CST  magnesium oxide base 500 mg oral tablet: 500 mg = 1 tab(s), Oral, BID, # 60 tab(s), 0 Refill(s), Pharmacy: [facility], 161, cm, Height/Length Dosing, 12/16/20 11:42:00 CST, 60, kg, Weight Dosing, 12/16/20 11:42:00 CST  metFORMIN 1000 mg oral tablet: 1,000 mg = 1 tab(s), Oral, BID, # 180 tab(s), 1 Refill(s), Pharmacy: [facility], 161, cm, Height/Length Dosing, 07/15/20 10:49:00 CDT, 66.8, kg, Weight Dosing, 07/15/20 10:49:00 CDT  mirtazapine 30 mg oral tablet: 30 mg =  1 tab(s), Oral, Once a day (at bedtime), # 90 tab(s), 1 Refill(s), Pharmacy: [facility], 161, cm, Height/Length Dosing, 11/19/20 15:21:00 CST, 59, kg, Weight Dosing, 11/19/20 15:21:00 CST  ropinirole 0.5 mg oral tablet: See Instructions, TAKE ONE TABLET BY MOUTH 1 to 3 hours before bedtime, # 30 tab(s), 5 Refill(s), Pharmacy: [facility], 161, cm, Height/Length Dosing, 11/24/20 9:08:00 CST, 59, kg, Weight Dosing, 11/24/20 9:08:00 CST  traMADol 50 mg oral tablet: 50 mg = 1 tab(s), Oral, q6hr, PRN PRN pain, mild, # 30 tab(s), 0 Refill(s), Pharmacy: [facility], 161, cm, Height/Length Dosing, 11/02/20 11:21:00 CST, 62.6, kg, Weight Dosing, 11/02/20 11:21:00 CST  Documented Medications  Documented  Aciphex 20 mg oral EC tablet (pt. own): 20 mg = 1 tab(s), Oral, Daily, 0 Refill(s)  BuSpar 7.5 mg oral tablet: = 1 tab(s), Oral, TID, # 90 tab(s), 0 Refill(s)  Bystolic 10 mg oral tablet: 10 mg = 1 tab(s), Oral, Daily, # 30 tab(s), 0 Refill(s)  Centrum Women's oral tablet: 1 tab(s), Oral, Daily, 0 Refill(s)  Cequa 0.09% ophthalmic solution: 1 drop(s), OPTH, BID  Lubricant Eye Drops ophthalmic solution: 1 drop(s), Eye-Both, BID, PRN PRN as needed for dry eyes, # 30 EA, 0 Refill(s)  Lyumjev KwikPen 100 units/mL injectable solution: See Instructions, sliding scale, 0 Refill(s)  Symbicort 160 mcg-4.5 mcg/inh inhalation aerosol: 2 puff(s), INH, BID, PRN PRN wheezing and sob, 0 Refill(s)  Trintellix 20 mg oral tablet: 20 mg = 1 tab(s), Oral, Daily  Vitamin B12 Injection -CCA: qMonth, 0 Refill(s)  alPRAzolam 0.5 mg oral tablet: 0.5 mg = 1 tab(s), Oral, At Bedtime, PRN for anxiety, 0 Refill(s)  albuterol-ipratropium 2.5 mg-0.5 mg/3 mL inhalation solution: 3 mL, NEB, q6hr, PRN PRN sob or wheezing  amlodipine-benazepril 10 mg-20 mg oral capsule: 1 cap(s), Oral, Daily, 0 Refill(s)  meloxicam 15 mg oral tablet: 15 mg = 1 tab(s), Oral, Daily, 0 Refill(s)  montelukast 10 mg oral TABLET: 10 mg = 1 tab(s),  Oral, Daily, 0 Refill(s)  potassium chloride 20 mEq oral TABLET extended release: 20 mEq = 1 tab(s), Oral, Daily   Problem list:    All Problems (Selected)  Acid reflux / 5832490049 / Confirmed  Anemia / 814750078 / Confirmed  Asthma / 282110959 / Confirmed  Vitamin B12 deficiency / 793020182 / Confirmed  Low vitamin D level / 265833035 / Confirmed  History of gastric bypass / 1126520376 / Confirmed  Hypertension / 58281784 / Confirmed  Depression with anxiety / 175924813 / Confirmed  INKI on CPAP / 845025186 / Confirmed  Stage IV squamous cell carcinoma of lung / 679831310 / Confirmed  Chemotherapy management, encounter for / 319950793 / Confirmed  Diabetes mellitus type 2 in nonobese / 6102655648 / Confirmed  Canceled: Vitamin B12 deficiency / 847779409  Canceled: Abnormal chest CT / 6595361456  Canceled: Family history of lung cancer / 8453263716  Canceled: Heart disease / 75827183  Canceled: Routine health maintenance / 031819645  Canceled: Sleep apnea / 760768922,    Active Problems (14)  Acid reflux   Anemia   Asthma   Chemotherapy management, encounter for   Depression with anxiety   Diabetes mellitus type 2 in nonobese   Diabetes type 2 on insulin   History of gastric bypass   Hypertension   Low vitamin D level   NIKI on CPAP   Stage IV squamous cell carcinoma of lung   Type 1 diabetes mellitus   Vitamin B12 deficiency         Histories   Past Medical History:    Active  Diabetes type 2 on insulin ([id number])   Family History:    No family history items have been selected or recorded.   Procedure history:    Mammogram ([id number]) on 12/1/2020 at 65 Years.  Comments:  12/16/2020 10:05 ARNALDO - Omaira Patel  [facility]  Port Insertion on 11/2/2020 at 65 Years.  Mammogram ([id number]) on 1/1/2019 at 63 Years.  Bronchoscopy w/ Needle Aspir Biopsy(s) on 10/19/2017 at 62 Years.  Comments:  10/19/2017 13:24 LAZARO - Chasity Barrios RRT  auto-populated from documented surgical case  Bronchoscopy  w/ Endobronch Biopsy(s) on 10/19/2017 at 62 Years.  Comments:  10/19/2017 13:24 Chasity Mistry RRT  auto-populated from documented surgical case  Bronchoscopy with Brushings on 10/19/2017 at 62 Years.  Comments:  10/19/2017 13:24 Chasity Mistry RRT  auto-populated from documented surgical case  Bronchoscopy, Ebus, 2 or Less Samples on 10/19/2017 at 62 Years.  Comments:  10/19/2017 13:24 Chasity Mistry RRT  auto-populated from documented surgical case  gastric sleeve and bypass.  cheryl fundiplication.  bilatteral bunnionectomy.  bilatteral carpel tunnel repair.  lasix eye surgery.  partial hysterectomy.  cholecysectomy.  tonsillectomy.  hemmorhedectomy.   Social History        Social & Psychosocial Habits    Alcohol  02/06/2015 Risk Assessment: Denies Alcohol Use    10/23/2020  Use: Current    Type: Liquor    Frequency: 1-2 times per week    Substance Use  02/06/2015 Risk Assessment: Denies Substance Abuse    12/16/2020  Use: Never    Tobacco  02/06/2015 Risk Assessment: Denies Tobacco Use    01/06/2021  Use: Former smoker, quit more    Patient Wants Consult For Cessation Counseling N/A    Abuse/Neglect  01/06/2021  SHX Any signs of abuse or neglect No    Spiritual/Cultural  07/15/2020  Ep[employer]opalian Preference Zoroastrian      07/15/2020  Branch of  Never in   .        Physical Examination   Vital Signs   1/6/2021 10:34 CST       Temperature Temporal Artery               36.4 DegC                             Peripheral Pulse Rate     84 bpm                             SpO2                      100 %                             Oxygen Therapy            Room air                             Systolic Blood Pressure   123 mmHg                             Diastolic Blood Pressure  83 mmHg     General:  Alert and oriented, No acute distress.    Eye:  Pupils are equal, round and reactive to light, Extraocular movements are intact, Normal conjunctiva.    HENT:  Normocephalic,  Tympanic membranes are clear, Normal hearing, Oral mucosa is moist, No pharyngeal erythema.    Neck:  Supple, Non-tender, No jugular venous distention, No lymphadenopathy.    Respiratory:  Lungs are clear to auscultation, Respirations are non-labored, Symmetrical chest wall expansion.    Cardiovascular:  Normal rate, Regular rhythm, No gallop, No edema.    Gastrointestinal:  Soft, Non-tender, Non-distended, Normal bowel sounds.    Lymphatics:  No lymphadenopathy neck, axilla, groin.    Musculoskeletal:  Normal range of motion.    Integumentary:  Warm, Intact, No pallor.    Neurologic:  Alert, Oriented, No focal deficits, Cranial Nerves II-XII are grossly intact, Normal deep tendon reflexes.    Cognition and Speech:  Oriented, Speech clear and coherent, Functional cognition intact.    Psychiatric:  Cooperative, Appropriate mood & affect, Normal judgment.    ECOG Performance Scale: 0 - Fully active; no performance restrictions.      Review / Management   Laboratory Results   Today's Lab Results : PowerNote Discrete Results   1/6/2021 10:36 CST       Est Creat Clearance Ser   66.50 mL/min    1/6/2021 10:30 CST       POC Hb                    11.6 mg/dL  LOW                             POC Hct                   34.0 %  LOW                             POC Sodium                138 mmol/L                             POC Potassium             3.8 mmol/L                             POC Chloride              102 mmol/L                             POC Ion Calcium           1.16 mmol/L                             POC Glucose               174 mg/dL  HI                             POC BUN                   13.0 mg/dL                             POC Creatinine            0.7 mg/dL                             POC AGAP                  13.0  NA    1/6/2021 10:17 CST       WBC                       5.5 x10(3)/mcL                             RBC                       2.99 x10(6)/mcL  LOW                             Hgb                        10.6 gm/dL  LOW                             Hct                       33.5 %  LOW                             Platelet                  360 x10(3)/mcL                             MCV                       112.0 fL  HI                             MCH                       35.5 pg  HI                             MCHC                      31.6 gm/dL  LOW                             RDW                       15.5 %                             MPV                       9.1 fL  LOW                             Abs Neut                  3.99 x10(3)/mcL                             NEUT%                     72.8 %  NA                             NEUT#                     4.0 x10(3)/mcL                             LYMPH%                    13.5 %  NA                             LYMPH#                    0.7 x10(3)/mcL                             MONO%                     10.0 %  NA                             MONO#                     0.6 x10(3)/mcL                             EOS%                      3.3 %  NA                             EOS#                      0.2 x10(3)/mcL                             BASO%                     0.4 %  NA                             BASO#                     0.0 x10(3)/mcL           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  DKA new onset diabetes secondary to immunotherapy  osteopenia  Chemotherapy encounter  Pancytopenia due to chemotherapy      Repeat imaging at [facility] on 7/28/20 showed a new superior left lower lobe 1 cm spiculated nodule, progressive subcarinal lymphadenopathy 3.2 x 1.4 cm  9/8/20: Started single agent Gemzar, Day 1 Day 8 q 3 weeks, tolerating well  Follow up with [facility] on 12/9/20 for restaging--stable disease      PLAN:  Continue gemcitabine, Cycle 6 today  RTC in 3 weeks with repeat labs and TD visit for cycle 7  Has repeat follow-up with MD Mcnamara in 3/2021 after cycle 8                       Other Physicians   [facility] team

## 2022-06-27 ENCOUNTER — TELEPHONE (OUTPATIENT)
Dept: HEMATOLOGY/ONCOLOGY | Facility: CLINIC | Age: 67
End: 2022-06-27
Payer: MEDICARE

## 2022-12-20 ENCOUNTER — DOCUMENTATION ONLY (OUTPATIENT)
Dept: INTERNAL MEDICINE | Facility: CLINIC | Age: 67
End: 2022-12-20
Payer: MEDICARE